# Patient Record
Sex: FEMALE | Race: BLACK OR AFRICAN AMERICAN | NOT HISPANIC OR LATINO | Employment: OTHER | URBAN - NONMETROPOLITAN AREA
[De-identification: names, ages, dates, MRNs, and addresses within clinical notes are randomized per-mention and may not be internally consistent; named-entity substitution may affect disease eponyms.]

---

## 2017-03-09 ENCOUNTER — HOSPITAL ENCOUNTER (OUTPATIENT)
Dept: GENERAL RADIOLOGY | Facility: HOSPITAL | Age: 82
Discharge: HOME | End: 2017-03-09
Admitting: FAMILY MEDICINE

## 2017-03-09 ENCOUNTER — TRANSCRIBE ORDERS (OUTPATIENT)
Dept: ADMINISTRATIVE | Facility: HOSPITAL | Age: 82
End: 2017-03-09

## 2017-03-09 ENCOUNTER — APPOINTMENT (OUTPATIENT)
Dept: LAB | Facility: HOSPITAL | Age: 82
End: 2017-03-09

## 2017-03-09 DIAGNOSIS — M53.3 SACROCOCCYGEAL DISORDERS, NOT ELSEWHERE CLASSIFIED: Primary | ICD-10-CM

## 2017-03-09 DIAGNOSIS — M15.0 PRIMARY GENERALIZED HYPERTROPHIC OSTEOARTHROSIS: ICD-10-CM

## 2017-03-09 DIAGNOSIS — Z71.3 DIETARY COUNSELING AND SURVEILLANCE: ICD-10-CM

## 2017-03-09 DIAGNOSIS — E11.9 TYPE 2 DIABETES MELLITUS WITHOUT COMPLICATION, UNSPECIFIED LONG TERM INSULIN USE STATUS: ICD-10-CM

## 2017-03-09 DIAGNOSIS — I63.9 CEREBRAL INFARCTION, UNSPECIFIED MECHANISM (HCC): ICD-10-CM

## 2017-03-09 DIAGNOSIS — I69.351 HEMIPLEGIA AND HEMIPARESIS FOLLOWING CEREBRAL INFARCTION AFFECTING RIGHT DOMINANT SIDE (HCC): ICD-10-CM

## 2017-03-09 DIAGNOSIS — I10 ESSENTIAL (PRIMARY) HYPERTENSION: ICD-10-CM

## 2017-03-09 LAB
BASOPHILS # BLD AUTO: 0.03 10*3/MM3 (ref 0–0.2)
BASOPHILS NFR BLD AUTO: 0.4 % (ref 0–2)
DEPRECATED RDW RBC AUTO: 42 FL (ref 40–54)
EOSINOPHIL # BLD AUTO: 0.51 10*3/MM3 (ref 0–0.7)
EOSINOPHIL NFR BLD AUTO: 7.5 % (ref 0–4)
ERYTHROCYTE [DISTWIDTH] IN BLOOD BY AUTOMATED COUNT: 13.4 % (ref 12–15)
HBA1C MFR BLD: 6 %
HCT VFR BLD AUTO: 37.6 % (ref 37–47)
HGB BLD-MCNC: 12.7 G/DL (ref 12–16)
IMM GRANULOCYTES # BLD: 0.01 10*3/MM3 (ref 0–0.03)
IMM GRANULOCYTES NFR BLD: 0.1 % (ref 0–5)
LYMPHOCYTES # BLD AUTO: 2.62 10*3/MM3 (ref 0.72–4.86)
LYMPHOCYTES NFR BLD AUTO: 38.6 % (ref 15–45)
MCH RBC QN AUTO: 29.1 PG (ref 28–32)
MCHC RBC AUTO-ENTMCNC: 33.8 G/DL (ref 33–36)
MCV RBC AUTO: 86 FL (ref 82–98)
MONOCYTES # BLD AUTO: 0.54 10*3/MM3 (ref 0.19–1.3)
MONOCYTES NFR BLD AUTO: 8 % (ref 4–12)
NEUTROPHILS # BLD AUTO: 3.08 10*3/MM3 (ref 1.87–8.4)
NEUTROPHILS NFR BLD AUTO: 45.4 % (ref 39–78)
PLATELET # BLD AUTO: 104 10*3/MM3 (ref 130–400)
PMV BLD AUTO: 11.6 FL (ref 6–12)
RBC # BLD AUTO: 4.37 10*6/MM3 (ref 4.2–5.4)
WBC NRBC COR # BLD: 6.79 10*3/MM3 (ref 4.8–10.8)

## 2017-03-09 PROCEDURE — 36415 COLL VENOUS BLD VENIPUNCTURE: CPT | Performed by: FAMILY MEDICINE

## 2017-03-09 PROCEDURE — 83036 HEMOGLOBIN GLYCOSYLATED A1C: CPT | Performed by: FAMILY MEDICINE

## 2017-03-09 PROCEDURE — 72170 X-RAY EXAM OF PELVIS: CPT

## 2017-03-09 PROCEDURE — 85025 COMPLETE CBC W/AUTO DIFF WBC: CPT | Performed by: FAMILY MEDICINE

## 2017-03-20 ENCOUNTER — HOSPITAL ENCOUNTER (EMERGENCY)
Age: 82
Discharge: HOME | End: 2017-03-20
Payer: MEDICARE

## 2017-03-20 ENCOUNTER — APPOINTMENT (OUTPATIENT)
Dept: GENERAL RADIOLOGY | Age: 82
End: 2017-03-20
Payer: MEDICARE

## 2017-03-20 VITALS
WEIGHT: 153 LBS | HEART RATE: 60 BPM | HEIGHT: 64 IN | DIASTOLIC BLOOD PRESSURE: 80 MMHG | TEMPERATURE: 97.4 F | RESPIRATION RATE: 17 BRPM | BODY MASS INDEX: 26.12 KG/M2 | OXYGEN SATURATION: 98 % | SYSTOLIC BLOOD PRESSURE: 169 MMHG

## 2017-03-20 DIAGNOSIS — M25.551 PAIN OF RIGHT HIP JOINT: Primary | ICD-10-CM

## 2017-03-20 PROCEDURE — 99283 EMERGENCY DEPT VISIT LOW MDM: CPT | Performed by: PHYSICIAN ASSISTANT

## 2017-03-20 PROCEDURE — 6370000000 HC RX 637 (ALT 250 FOR IP): Performed by: PHYSICIAN ASSISTANT

## 2017-03-20 PROCEDURE — 73502 X-RAY EXAM HIP UNI 2-3 VIEWS: CPT

## 2017-03-20 PROCEDURE — 99283 EMERGENCY DEPT VISIT LOW MDM: CPT

## 2017-03-20 RX ORDER — HYDROCODONE BITARTRATE AND ACETAMINOPHEN 5; 325 MG/1; MG/1
1 TABLET ORAL ONCE
Status: COMPLETED | OUTPATIENT
Start: 2017-03-20 | End: 2017-03-20

## 2017-03-20 RX ORDER — CYCLOBENZAPRINE HCL 10 MG
10 TABLET ORAL 3 TIMES DAILY PRN
Qty: 15 TABLET | Refills: 0 | Status: SHIPPED | OUTPATIENT
Start: 2017-03-20 | End: 2017-03-30

## 2017-03-20 RX ADMIN — HYDROCODONE BITARTRATE AND ACETAMINOPHEN 1 TABLET: 5; 325 TABLET ORAL at 13:27

## 2017-03-20 ASSESSMENT — PAIN DESCRIPTION - PAIN TYPE: TYPE: ACUTE PAIN

## 2017-03-20 ASSESSMENT — PAIN DESCRIPTION - LOCATION: LOCATION: HIP

## 2017-03-20 ASSESSMENT — PAIN SCALES - GENERAL
PAINLEVEL_OUTOF10: 10
PAINLEVEL_OUTOF10: 7

## 2017-03-21 ASSESSMENT — ENCOUNTER SYMPTOMS
APNEA: 0
NAUSEA: 0
DIARRHEA: 0
EYE PAIN: 0
VOMITING: 0
RHINORRHEA: 0
ABDOMINAL PAIN: 0
SORE THROAT: 0
SHORTNESS OF BREATH: 0
ABDOMINAL DISTENTION: 0
CONSTIPATION: 0
SINUS PRESSURE: 0
CHEST TIGHTNESS: 0
WHEEZING: 0
COUGH: 0

## 2017-07-05 ENCOUNTER — HOSPITAL ENCOUNTER (OUTPATIENT)
Dept: GENERAL RADIOLOGY | Facility: HOSPITAL | Age: 82
Discharge: HOME | End: 2017-07-05

## 2017-07-05 ENCOUNTER — HOSPITAL ENCOUNTER (OUTPATIENT)
Dept: GENERAL RADIOLOGY | Facility: HOSPITAL | Age: 82
Discharge: HOME | End: 2017-07-05
Admitting: CLINICAL NURSE SPECIALIST

## 2017-07-05 ENCOUNTER — TRANSCRIBE ORDERS (OUTPATIENT)
Dept: LAB | Facility: HOSPITAL | Age: 82
End: 2017-07-05

## 2017-07-05 DIAGNOSIS — M54.5 LOW BACK PAIN, UNSPECIFIED BACK PAIN LATERALITY, UNSPECIFIED CHRONICITY, WITH SCIATICA PRESENCE UNSPECIFIED: ICD-10-CM

## 2017-07-05 DIAGNOSIS — M19.071: ICD-10-CM

## 2017-07-05 DIAGNOSIS — M54.2 CERVICALGIA: ICD-10-CM

## 2017-07-05 DIAGNOSIS — M19.071: Primary | ICD-10-CM

## 2017-07-05 PROCEDURE — 72110 X-RAY EXAM L-2 SPINE 4/>VWS: CPT

## 2017-07-05 PROCEDURE — 72050 X-RAY EXAM NECK SPINE 4/5VWS: CPT

## 2017-07-05 PROCEDURE — 73630 X-RAY EXAM OF FOOT: CPT

## 2017-07-05 PROCEDURE — 73610 X-RAY EXAM OF ANKLE: CPT

## 2018-09-22 ENCOUNTER — APPOINTMENT (OUTPATIENT)
Dept: CT IMAGING | Facility: HOSPITAL | Age: 83
End: 2018-09-22

## 2018-09-22 ENCOUNTER — HOSPITAL ENCOUNTER (EMERGENCY)
Facility: HOSPITAL | Age: 83
Discharge: HOME | End: 2018-09-22
Attending: EMERGENCY MEDICINE | Admitting: EMERGENCY MEDICINE

## 2018-09-22 ENCOUNTER — APPOINTMENT (OUTPATIENT)
Dept: GENERAL RADIOLOGY | Facility: HOSPITAL | Age: 83
End: 2018-09-22

## 2018-09-22 VITALS
HEIGHT: 64 IN | WEIGHT: 126 LBS | HEART RATE: 78 BPM | OXYGEN SATURATION: 96 % | RESPIRATION RATE: 20 BRPM | TEMPERATURE: 100.5 F | DIASTOLIC BLOOD PRESSURE: 61 MMHG | BODY MASS INDEX: 21.51 KG/M2 | SYSTOLIC BLOOD PRESSURE: 148 MMHG

## 2018-09-22 DIAGNOSIS — N39.0 URINARY TRACT INFECTION WITHOUT HEMATURIA, SITE UNSPECIFIED: ICD-10-CM
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
## 2018-09-22 LAB
ALBUMIN SERPL-MCNC: 4.4 G/DL (ref 3.5–5)
ALBUMIN/GLOB SERPL: 1.1 G/DL (ref 1.1–2.5)
ALP SERPL-CCNC: 87 U/L (ref 24–120)
ALT SERPL W P-5'-P-CCNC: 16 U/L (ref 0–54)
ANION GAP SERPL CALCULATED.3IONS-SCNC: 13 MMOL/L (ref 4–13)
APTT PPP: 26.9 SECONDS (ref 24.1–34.8)
AST SERPL-CCNC: 35 U/L (ref 7–45)
BACTERIA UR QL AUTO: ABNORMAL /HPF
BILIRUB SERPL-MCNC: 2.4 MG/DL (ref 0.1–1)
BILIRUB UR QL STRIP: NEGATIVE
BUN BLD-MCNC: 13 MG/DL (ref 5–21)
BUN/CREAT SERPL: 17.3 (ref 7–25)
CALCIUM SPEC-SCNC: 9.6 MG/DL (ref 8.4–10.4)
CHLORIDE SERPL-SCNC: 104 MMOL/L (ref 98–110)
CK MB SERPL-CCNC: 1.4 NG/ML (ref 0–5)
CLARITY UR: CLEAR
CO2 SERPL-SCNC: 26 MMOL/L (ref 24–31)
COLOR UR: YELLOW
CREAT BLD-MCNC: 0.75 MG/DL (ref 0.5–1.4)
DEPRECATED RDW RBC AUTO: 40.7 FL (ref 40–54)
EOSINOPHIL # BLD MANUAL: 0.12 10*3/MM3 (ref 0–0.7)
EOSINOPHIL NFR BLD MANUAL: 1 % (ref 0–4)
ERYTHROCYTE [DISTWIDTH] IN BLOOD BY AUTOMATED COUNT: 12.9 % (ref 12–15)
GFR SERPL CREATININE-BSD FRML MDRD: 89 ML/MIN/1.73
GLOBULIN UR ELPH-MCNC: 4.1 GM/DL
GLUCOSE BLD-MCNC: 127 MG/DL (ref 70–100)
GLUCOSE UR STRIP-MCNC: NEGATIVE MG/DL
HCT VFR BLD AUTO: 37.4 % (ref 37–47)
HGB BLD-MCNC: 13 G/DL (ref 12–16)
HGB UR QL STRIP.AUTO: ABNORMAL
INR PPP: 1.07 (ref 0.91–1.09)
KETONES UR QL STRIP: ABNORMAL
LEUKOCYTE ESTERASE UR QL STRIP.AUTO: ABNORMAL
LYMPHOCYTES # BLD MANUAL: 1.46 10*3/MM3 (ref 0.72–4.86)
LYMPHOCYTES NFR BLD MANUAL: 12 % (ref 15–45)
LYMPHOCYTES NFR BLD MANUAL: 4 % (ref 4–12)
MCH RBC QN AUTO: 30 PG (ref 28–32)
MCHC RBC AUTO-ENTMCNC: 34.8 G/DL (ref 33–36)
MCV RBC AUTO: 86.4 FL (ref 82–98)
MONOCYTES # BLD AUTO: 0.49 10*3/MM3 (ref 0.19–1.3)
NEUTROPHILS # BLD AUTO: 10.08 10*3/MM3 (ref 1.87–8.4)
NEUTROPHILS NFR BLD MANUAL: 83 % (ref 39–78)
NITRITE UR QL STRIP: NEGATIVE
PH UR STRIP.AUTO: 6 [PH] (ref 5–8)
PLATELET # BLD AUTO: 112 10*3/MM3 (ref 130–400)
PMV BLD AUTO: 10.8 FL (ref 6–12)
POTASSIUM BLD-SCNC: 4.1 MMOL/L (ref 3.5–5.3)
PROT SERPL-MCNC: 8.5 G/DL (ref 6.3–8.7)
PROT UR QL STRIP: ABNORMAL
PROTHROMBIN TIME: 14.2 SECONDS (ref 11.9–14.6)
RBC # BLD AUTO: 4.33 10*6/MM3 (ref 4.2–5.4)
RBC # UR: ABNORMAL /HPF
RBC MORPH BLD: NORMAL
REF LAB TEST METHOD: ABNORMAL
SMALL PLATELETS BLD QL SMEAR: ABNORMAL
SODIUM BLD-SCNC: 143 MMOL/L (ref 135–145)
SP GR UR STRIP: 1.02 (ref 1–1.03)
SQUAMOUS #/AREA URNS HPF: ABNORMAL /HPF
URINE MYOGLOBIN, QUALITATIVE: POSITIVE
UROBILINOGEN UR QL STRIP: ABNORMAL
WBC MORPH BLD: NORMAL
WBC NRBC COR # BLD: 12.15 10*3/MM3 (ref 4.8–10.8)
WBC UR QL AUTO: ABNORMAL /HPF

## 2018-09-22 PROCEDURE — 36415 COLL VENOUS BLD VENIPUNCTURE: CPT | Performed by: NURSE PRACTITIONER

## 2018-09-22 PROCEDURE — 81001 URINALYSIS AUTO W/SCOPE: CPT | Performed by: NURSE PRACTITIONER

## 2018-09-22 PROCEDURE — 99284 EMERGENCY DEPT VISIT MOD MDM: CPT

## 2018-09-22 PROCEDURE — 73523 X-RAY EXAM HIPS BI 5/> VIEWS: CPT

## 2018-09-22 PROCEDURE — 85007 BL SMEAR W/DIFF WBC COUNT: CPT | Performed by: NURSE PRACTITIONER

## 2018-09-22 PROCEDURE — 85025 COMPLETE CBC W/AUTO DIFF WBC: CPT | Performed by: NURSE PRACTITIONER

## 2018-09-22 PROCEDURE — 82553 CREATINE MB FRACTION: CPT | Performed by: NURSE PRACTITIONER

## 2018-09-22 PROCEDURE — 80053 COMPREHEN METABOLIC PANEL: CPT | Performed by: NURSE PRACTITIONER

## 2018-09-22 PROCEDURE — 85730 THROMBOPLASTIN TIME PARTIAL: CPT | Performed by: NURSE PRACTITIONER

## 2018-09-22 PROCEDURE — 83874 ASSAY OF MYOGLOBIN: CPT | Performed by: NURSE PRACTITIONER

## 2018-09-22 PROCEDURE — 70450 CT HEAD/BRAIN W/O DYE: CPT

## 2018-09-22 PROCEDURE — 85610 PROTHROMBIN TIME: CPT | Performed by: NURSE PRACTITIONER

## 2018-09-22 RX ORDER — IBUPROFEN 200 MG
200 TABLET ORAL EVERY 6 HOURS PRN
COMMUNITY

## 2018-09-22 RX ORDER — CEFDINIR 300 MG/1
300 CAPSULE ORAL 2 TIMES DAILY
Qty: 20 CAPSULE | Refills: 0 | Status: SHIPPED | OUTPATIENT
Start: 2018-09-22 | End: 2018-10-02

## 2018-09-22 RX ORDER — CLONIDINE HYDROCHLORIDE 0.1 MG/1
0.1 TABLET ORAL ONCE
Status: COMPLETED | OUTPATIENT
Start: 2018-09-22 | End: 2018-09-22

## 2018-09-22 RX ORDER — NAPROXEN SODIUM 220 MG
220 TABLET ORAL AS NEEDED
COMMUNITY

## 2018-09-22 RX ADMIN — CLONIDINE HYDROCHLORIDE 0.1 MG: 0.1 TABLET ORAL at 16:41

## 2018-09-23 ENCOUNTER — HOSPITAL ENCOUNTER (INPATIENT)
Age: 83
LOS: 4 days | Discharge: NURSING, REHAB OR LONG-TERM CARE | DRG: 564 | End: 2018-09-27
Attending: FAMILY MEDICINE | Admitting: INTERNAL MEDICINE
Payer: MEDICARE

## 2018-09-23 ENCOUNTER — APPOINTMENT (OUTPATIENT)
Dept: GENERAL RADIOLOGY | Age: 83
DRG: 564 | End: 2018-09-23
Payer: MEDICARE

## 2018-09-23 DIAGNOSIS — M62.82 NON-TRAUMATIC RHABDOMYOLYSIS: ICD-10-CM

## 2018-09-23 DIAGNOSIS — R10.9 RIGHT FLANK PAIN: Primary | ICD-10-CM

## 2018-09-23 PROBLEM — E86.0 DEHYDRATION: Status: ACTIVE | Noted: 2018-09-23

## 2018-09-23 LAB
ALBUMIN SERPL-MCNC: 3.8 G/DL (ref 3.5–5.2)
ALP BLD-CCNC: 65 U/L (ref 35–104)
ALT SERPL-CCNC: 10 U/L (ref 5–33)
ANION GAP SERPL CALCULATED.3IONS-SCNC: 14 MMOL/L (ref 7–19)
AST SERPL-CCNC: 28 U/L (ref 5–32)
BASE EXCESS ARTERIAL: -1.2 MMOL/L (ref -2–2)
BASOPHILS ABSOLUTE: 0 K/UL (ref 0–0.2)
BASOPHILS RELATIVE PERCENT: 0.2 % (ref 0–1)
BILIRUB SERPL-MCNC: 2 MG/DL (ref 0.2–1.2)
BUN BLDV-MCNC: 19 MG/DL (ref 8–23)
C-REACTIVE PROTEIN: 21.9 MG/DL (ref 0–0.5)
CALCIUM SERPL-MCNC: 9.4 MG/DL (ref 8.8–10.2)
CARBOXYHEMOGLOBIN ARTERIAL: 2.5 % (ref 0–5)
CHLORIDE BLD-SCNC: 102 MMOL/L (ref 98–111)
CO2: 24 MMOL/L (ref 22–29)
CREAT SERPL-MCNC: 1 MG/DL (ref 0.5–0.9)
EOSINOPHILS ABSOLUTE: 0 K/UL (ref 0–0.6)
EOSINOPHILS RELATIVE PERCENT: 0.3 % (ref 0–5)
FOLATE: 15.7 NG/ML (ref 4.8–37.3)
GFR NON-AFRICAN AMERICAN: 52
GLUCOSE BLD-MCNC: 112 MG/DL (ref 74–109)
HBA1C MFR BLD: 5.8 % (ref 4–6)
HCO3 ARTERIAL: 22.4 MMOL/L (ref 22–26)
HCT VFR BLD CALC: 38.2 % (ref 37–47)
HEMOGLOBIN, ART, EXTENDED: 12.1 G/DL (ref 12–16)
HEMOGLOBIN: 12.8 G/DL (ref 12–16)
LACTIC ACID: 0.9 MMOL/L (ref 0.5–1.9)
LYMPHOCYTES ABSOLUTE: 1.4 K/UL (ref 1.1–4.5)
LYMPHOCYTES RELATIVE PERCENT: 10.9 % (ref 20–40)
MAGNESIUM: 2 MG/DL (ref 1.6–2.4)
MAGNESIUM: 2.1 MG/DL (ref 1.6–2.4)
MCH RBC QN AUTO: 30 PG (ref 27–31)
MCHC RBC AUTO-ENTMCNC: 33.5 G/DL (ref 33–37)
MCV RBC AUTO: 89.7 FL (ref 81–99)
METHEMOGLOBIN ARTERIAL: 1.4 %
MONOCYTES ABSOLUTE: 0.9 K/UL (ref 0–0.9)
MONOCYTES RELATIVE PERCENT: 6.7 % (ref 0–10)
NEUTROPHILS ABSOLUTE: 10.3 K/UL (ref 1.5–7.5)
NEUTROPHILS RELATIVE PERCENT: 81.4 % (ref 50–65)
O2 CONTENT ARTERIAL: 15.3 ML/DL
O2 SAT, ARTERIAL: 90 %
O2 THERAPY: ABNORMAL
PCO2 ARTERIAL: 33 MMHG (ref 35–45)
PDW BLD-RTO: 13 % (ref 11.5–14.5)
PH ARTERIAL: 7.44 (ref 7.35–7.45)
PLATELET # BLD: 112 K/UL (ref 130–400)
PMV BLD AUTO: 9.9 FL (ref 9.4–12.3)
PO2 ARTERIAL: 57 MMHG (ref 80–100)
POTASSIUM REFLEX MAGNESIUM: 3.6 MMOL/L (ref 3.5–5)
POTASSIUM, WHOLE BLOOD: 3.6
PRO-BNP: 1504 PG/ML (ref 0–1800)
RBC # BLD: 4.26 M/UL (ref 4.2–5.4)
SEDIMENTATION RATE, ERYTHROCYTE: 50 MM/HR (ref 0–25)
SODIUM BLD-SCNC: 140 MMOL/L (ref 136–145)
TOTAL CK: 365 U/L (ref 26–192)
TOTAL PROTEIN: 8 G/DL (ref 6.6–8.7)
TSH SERPL DL<=0.05 MIU/L-ACNC: 1.02 UIU/ML (ref 0.27–4.2)
VITAMIN B-12: 284 PG/ML (ref 211–946)
VITAMIN D 25-HYDROXY: 16.1 NG/ML
WBC # BLD: 12.7 K/UL (ref 4.8–10.8)

## 2018-09-23 PROCEDURE — 80053 COMPREHEN METABOLIC PANEL: CPT

## 2018-09-23 PROCEDURE — 83880 ASSAY OF NATRIURETIC PEPTIDE: CPT

## 2018-09-23 PROCEDURE — 99285 EMERGENCY DEPT VISIT HI MDM: CPT | Performed by: FAMILY MEDICINE

## 2018-09-23 PROCEDURE — 2500000003 HC RX 250 WO HCPCS: Performed by: HOSPITALIST

## 2018-09-23 PROCEDURE — 73552 X-RAY EXAM OF FEMUR 2/>: CPT

## 2018-09-23 PROCEDURE — 96376 TX/PRO/DX INJ SAME DRUG ADON: CPT

## 2018-09-23 PROCEDURE — 93005 ELECTROCARDIOGRAM TRACING: CPT

## 2018-09-23 PROCEDURE — 83735 ASSAY OF MAGNESIUM: CPT

## 2018-09-23 PROCEDURE — 84132 ASSAY OF SERUM POTASSIUM: CPT

## 2018-09-23 PROCEDURE — 82306 VITAMIN D 25 HYDROXY: CPT

## 2018-09-23 PROCEDURE — 96374 THER/PROPH/DIAG INJ IV PUSH: CPT

## 2018-09-23 PROCEDURE — 83036 HEMOGLOBIN GLYCOSYLATED A1C: CPT

## 2018-09-23 PROCEDURE — 82550 ASSAY OF CK (CPK): CPT

## 2018-09-23 PROCEDURE — 36415 COLL VENOUS BLD VENIPUNCTURE: CPT

## 2018-09-23 PROCEDURE — 94762 N-INVAS EAR/PLS OXIMTRY CONT: CPT

## 2018-09-23 PROCEDURE — 85652 RBC SED RATE AUTOMATED: CPT

## 2018-09-23 PROCEDURE — 84443 ASSAY THYROID STIM HORMONE: CPT

## 2018-09-23 PROCEDURE — 87040 BLOOD CULTURE FOR BACTERIA: CPT

## 2018-09-23 PROCEDURE — 99285 EMERGENCY DEPT VISIT HI MDM: CPT

## 2018-09-23 PROCEDURE — 94640 AIRWAY INHALATION TREATMENT: CPT

## 2018-09-23 PROCEDURE — 83605 ASSAY OF LACTIC ACID: CPT

## 2018-09-23 PROCEDURE — 6370000000 HC RX 637 (ALT 250 FOR IP): Performed by: HOSPITALIST

## 2018-09-23 PROCEDURE — 99222 1ST HOSP IP/OBS MODERATE 55: CPT | Performed by: HOSPITALIST

## 2018-09-23 PROCEDURE — 82746 ASSAY OF FOLIC ACID SERUM: CPT

## 2018-09-23 PROCEDURE — 6360000002 HC RX W HCPCS: Performed by: HOSPITALIST

## 2018-09-23 PROCEDURE — 87086 URINE CULTURE/COLONY COUNT: CPT

## 2018-09-23 PROCEDURE — 36600 WITHDRAWAL OF ARTERIAL BLOOD: CPT

## 2018-09-23 PROCEDURE — 2580000003 HC RX 258: Performed by: HOSPITALIST

## 2018-09-23 PROCEDURE — 1210000000 HC MED SURG R&B

## 2018-09-23 PROCEDURE — 86140 C-REACTIVE PROTEIN: CPT

## 2018-09-23 PROCEDURE — 6360000002 HC RX W HCPCS: Performed by: FAMILY MEDICINE

## 2018-09-23 PROCEDURE — 71046 X-RAY EXAM CHEST 2 VIEWS: CPT

## 2018-09-23 PROCEDURE — 2700000000 HC OXYGEN THERAPY PER DAY

## 2018-09-23 PROCEDURE — 82607 VITAMIN B-12: CPT

## 2018-09-23 PROCEDURE — 82803 BLOOD GASES ANY COMBINATION: CPT

## 2018-09-23 PROCEDURE — 2580000003 HC RX 258: Performed by: FAMILY MEDICINE

## 2018-09-23 PROCEDURE — 85025 COMPLETE CBC W/AUTO DIFF WBC: CPT

## 2018-09-23 RX ORDER — MORPHINE SULFATE/0.9% NACL/PF 1 MG/ML
2 SYRINGE (ML) INJECTION ONCE
Status: COMPLETED | OUTPATIENT
Start: 2018-09-23 | End: 2018-09-23

## 2018-09-23 RX ORDER — CYANOCOBALAMIN 1000 UG/ML
1000 INJECTION INTRAMUSCULAR; SUBCUTANEOUS ONCE
Status: COMPLETED | OUTPATIENT
Start: 2018-09-23 | End: 2018-09-23

## 2018-09-23 RX ORDER — FUROSEMIDE 10 MG/ML
40 INJECTION INTRAMUSCULAR; INTRAVENOUS ONCE
Status: COMPLETED | OUTPATIENT
Start: 2018-09-23 | End: 2018-09-23

## 2018-09-23 RX ORDER — ERGOCALCIFEROL 1.25 MG/1
50000 CAPSULE ORAL WEEKLY
Status: DISCONTINUED | OUTPATIENT
Start: 2018-09-23 | End: 2018-09-27 | Stop reason: HOSPADM

## 2018-09-23 RX ORDER — IPRATROPIUM BROMIDE AND ALBUTEROL SULFATE 2.5; .5 MG/3ML; MG/3ML
1 SOLUTION RESPIRATORY (INHALATION)
Status: DISCONTINUED | OUTPATIENT
Start: 2018-09-23 | End: 2018-09-26

## 2018-09-23 RX ORDER — 0.9 % SODIUM CHLORIDE 0.9 %
500 INTRAVENOUS SOLUTION INTRAVENOUS ONCE
Status: COMPLETED | OUTPATIENT
Start: 2018-09-23 | End: 2018-09-23

## 2018-09-23 RX ORDER — SODIUM CHLORIDE 9 MG/ML
INJECTION, SOLUTION INTRAVENOUS CONTINUOUS
Status: DISCONTINUED | OUTPATIENT
Start: 2018-09-23 | End: 2018-09-23

## 2018-09-23 RX ORDER — LISINOPRIL 10 MG/1
10 TABLET ORAL DAILY
Status: DISCONTINUED | OUTPATIENT
Start: 2018-09-23 | End: 2018-09-27 | Stop reason: HOSPADM

## 2018-09-23 RX ORDER — HYDRALAZINE HYDROCHLORIDE 20 MG/ML
5 INJECTION INTRAMUSCULAR; INTRAVENOUS EVERY 4 HOURS PRN
Status: DISCONTINUED | OUTPATIENT
Start: 2018-09-23 | End: 2018-09-27 | Stop reason: HOSPADM

## 2018-09-23 RX ADMIN — Medication 2 MG: at 13:21

## 2018-09-23 RX ADMIN — SODIUM CHLORIDE 1000 ML: 9 INJECTION, SOLUTION INTRAVENOUS at 12:45

## 2018-09-23 RX ADMIN — LISINOPRIL 10 MG: 10 TABLET ORAL at 18:28

## 2018-09-23 RX ADMIN — SODIUM CHLORIDE: 9 INJECTION, SOLUTION INTRAVENOUS at 18:29

## 2018-09-23 RX ADMIN — CYANOCOBALAMIN 1000 MCG: 1000 INJECTION, SOLUTION INTRAMUSCULAR at 20:38

## 2018-09-23 RX ADMIN — ERGOCALCIFEROL 50000 UNITS: 1.25 CAPSULE ORAL at 20:38

## 2018-09-23 RX ADMIN — TAZOBACTAM SODIUM AND PIPERACILLIN SODIUM 3.38 G: 375; 3 INJECTION, SOLUTION INTRAVENOUS at 20:47

## 2018-09-23 RX ADMIN — FUROSEMIDE 40 MG: 10 INJECTION, SOLUTION INTRAMUSCULAR; INTRAVENOUS at 23:18

## 2018-09-23 RX ADMIN — Medication 2 MG: at 14:54

## 2018-09-23 RX ADMIN — IPRATROPIUM BROMIDE AND ALBUTEROL SULFATE 1 AMPULE: .5; 3 SOLUTION RESPIRATORY (INHALATION) at 19:00

## 2018-09-23 ASSESSMENT — ENCOUNTER SYMPTOMS
BACK PAIN: 0
SHORTNESS OF BREATH: 0
DIARRHEA: 0
VOMITING: 0
CONSTIPATION: 0
COUGH: 0
SORE THROAT: 0
TROUBLE SWALLOWING: 0
APNEA: 0
ABDOMINAL DISTENTION: 0
WHEEZING: 0
CHEST TIGHTNESS: 0
ABDOMINAL PAIN: 0

## 2018-09-23 ASSESSMENT — PAIN DESCRIPTION - PAIN TYPE: TYPE: ACUTE PAIN

## 2018-09-23 ASSESSMENT — PAIN SCALES - GENERAL
PAINLEVEL_OUTOF10: 0
PAINLEVEL_OUTOF10: 10
PAINLEVEL_OUTOF10: 10
PAINLEVEL_OUTOF10: 9

## 2018-09-23 ASSESSMENT — PAIN DESCRIPTION - ORIENTATION: ORIENTATION: RIGHT

## 2018-09-23 ASSESSMENT — PAIN DESCRIPTION - LOCATION: LOCATION: GENERALIZED

## 2018-09-23 NOTE — Clinical Note
Patient Class: Observation [104]   REQUIRED: Diagnosis: Dehydration [276.51. ICD-9-CM]   Estimated Length of Stay: Estimated stay of more than 2 midnights   Future Attending Provider: Colt Brock [[id number]]

## 2018-09-23 NOTE — CARE COORDINATION
Spoke with patient and daughter at bedside as well as one of her daughters via speakerphone. Patient is in pain and has difficulty remembering the sequence of events that brought her here. Evidently the patient had fallen in the floor and had spent unknown time down. Her daughter said she spoke to her sometime Friday and she was fine and then she was found down on the floor by staff at the [facility] on Saturday and they called EMS. ;  Patient was taken to the ED at [facility] and was found to have a UTI and discharged home on antibiotics. This am the patient was found by her daughter to be \"stuck\" on the couch; unable to come to standing and having significant amount of pain. Patient agrees that she \"probably\" does need to go somewhere for some rehab but is fearful that she will lose her apartment. Explained to the patient that if she gets better from rehab she may be able to return to her home (daugher and I had spoken privately and she believes it is time for long term placement for her mother but she doesn't want to take hope away from her mother at this time)  Patient has been at [facility] in the past following her stroke and that is the placement the family prefers. Contacted Jonny Marroquin, admission  liason at [facility], to verify that [facility] does not require a 3 day inpatient admission, and thankfully this is the case. Relayed this information to Dr Rosendo Valente; expecting the patient to be admitted observation status. Will notify [facility] of the referral.  Of note, there will be a precert required for admission to the SNF. Please order PT eval upon admission as this will be required for the precert. Care Management staff will follow.   Electronically signed by Francisco Leos RN on 9/23/2018 at 3:20 PM

## 2018-09-23 NOTE — ED PROVIDER NOTES
Psychiatric/Behavioral: Negative for behavioral problems and suicidal ideas. All other systems reviewed and are negative. 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  Hiatal hernia     Stroke ([facility]     Tobacco abuse     Type 2 diabetes mellitus without complication ([facility] 03/24/9073         SURGICAL HISTORY       Past Surgical History:   Procedure Laterality Date    CARDIAC CATHETERIZATION  09/25/2003    EF greater 50%  Angiographically normal coronary arteries, Normal left ventricular systolic function, Mild left ventricular systolic function, Mild left ventricular diastolic dysfunction and mild systemic hypertesion.  HYSTERECTOMY           CURRENT MEDICATIONS       Previous Medications    ACETAMINOPHEN 650 MG TABS    Take 650 mg by mouth every 4 hours as needed       ALLERGIES     Patient has no known allergies. FAMILY HISTORY       Family History   Problem Relation Age of Onset    Diabetes Mother     Heart Disease Maternal Grandmother           SOCIAL HISTORY       Social History     Social History    Marital status:       Spouse name: N/A    Number of children: N/A    Years of education: N/A     Social History Main Topics    Smoking status: Current Every Day Smoker     Packs/day: 0.50     Years: 70.00     Types: Cigarettes    Smokeless tobacco: Never Used    Alcohol use No    Drug use: No    Sexual activity: Not Currently     Other Topics Concern    Not on file     Social History Narrative    No narrative on file       SCREENINGS    Kobi Coma Scale  Eye Opening: Spontaneous  Best Verbal Response: Oriented  Best Motor Response: Obeys commands  Kobi

## 2018-09-23 NOTE — H&P
Allergies:  Patient has no known allergies. Review of Systems:   · Constitutional: there has been no unanticipated weight loss. There's been change in energy level, sleep pattern, or activity level. · Eyes: No visual changes or diplopia. No scleral icterus. · ENT: No Headaches, hearing loss or vertigo. No mouth sores or sore throat. · Cardiovascular: No chest pain, dyspnea on exertion, palpitations or loss of consciousness. No cough, hemoptysis, pleuritic pain, or phlebitis. · Respiratory: No cough or wheezing, no sputum production. No hemoptysis. · Gastrointestinal: No abdominal pain, appetite loss, blood in stools. No change in bowel or bladder habits. · Genitourinary: No dysuria, trouble voiding, or hematuria. · Musculoskeletal:  Right sided weakness after stroke, falls  · Integumentary: No rash or pruritis. · Neurological: No headache, diplopia, new change in muscle strength, numbness or tingling. No change in mood, affect, memory, mentation, behavior. · Psychiatric: No anxiety, or depression. · Endocrine: No temperature intolerance. No excessive thirst, fluid intake, or urination. No tremor. · Hematologic/Lymphatic: No abnormal bruising or bleeding, blood clots or swollen lymph nodes. · Allergic/Immunologic: No nasal congestion or hives. Physical Examination:    Vital Signs: BP (!) 163/77   Pulse 78   Temp 101.7 °F (38.7 °C)   Resp 16   Ht 5' 2\" (1.575 m)   Wt 130 lb (59 kg)   LMP  (LMP Unknown)   SpO2 (!) 88%   Breastfeeding? No   BMI 23.78 kg/m²   General appearance: Well preserved, mesomorphic body habitus, alert, mild distress. Skin: Skin color, texture, turgor normal. No rashes or lesions. No induration or tightening palpated. Head: Normocephalic. No masses, lesions, tenderness or abnormalities  Eyes: conjunctivae/corneas clear. EOM's intact. Sclera non icteric. Ears: External ears normal.   Hearing normal to finger rub.   Nose/Sinuses: Nares normal. Septum

## 2018-09-24 LAB
ANION GAP SERPL CALCULATED.3IONS-SCNC: 14 MMOL/L (ref 7–19)
BUN BLDV-MCNC: 22 MG/DL (ref 8–23)
CALCIUM SERPL-MCNC: 8.7 MG/DL (ref 8.8–10.2)
CHLORIDE BLD-SCNC: 101 MMOL/L (ref 98–111)
CHOLESTEROL, TOTAL: 137 MG/DL (ref 160–199)
CO2: 24 MMOL/L (ref 22–29)
CREAT SERPL-MCNC: 1 MG/DL (ref 0.5–0.9)
GFR NON-AFRICAN AMERICAN: 52
GLUCOSE BLD-MCNC: 132 MG/DL (ref 74–109)
HDLC SERPL-MCNC: 39 MG/DL (ref 65–121)
LDL CHOLESTEROL CALCULATED: 79 MG/DL
LV EF: 58 %
LVEF MODALITY: NORMAL
POTASSIUM SERPL-SCNC: 3.2 MMOL/L (ref 3.5–5)
SODIUM BLD-SCNC: 139 MMOL/L (ref 136–145)
TOTAL CK: 465 U/L (ref 26–192)
TRIGL SERPL-MCNC: 94 MG/DL (ref 0–149)

## 2018-09-24 PROCEDURE — 36415 COLL VENOUS BLD VENIPUNCTURE: CPT

## 2018-09-24 PROCEDURE — G8988 SELF CARE GOAL STATUS: HCPCS

## 2018-09-24 PROCEDURE — G8979 MOBILITY GOAL STATUS: HCPCS

## 2018-09-24 PROCEDURE — G8978 MOBILITY CURRENT STATUS: HCPCS

## 2018-09-24 PROCEDURE — 6370000000 HC RX 637 (ALT 250 FOR IP): Performed by: NURSE PRACTITIONER

## 2018-09-24 PROCEDURE — 6370000000 HC RX 637 (ALT 250 FOR IP): Performed by: HOSPITALIST

## 2018-09-24 PROCEDURE — G8996 SWALLOW CURRENT STATUS: HCPCS

## 2018-09-24 PROCEDURE — 82550 ASSAY OF CK (CPK): CPT

## 2018-09-24 PROCEDURE — 99232 SBSQ HOSP IP/OBS MODERATE 35: CPT | Performed by: INTERNAL MEDICINE

## 2018-09-24 PROCEDURE — 94640 AIRWAY INHALATION TREATMENT: CPT

## 2018-09-24 PROCEDURE — 6360000002 HC RX W HCPCS: Performed by: INTERNAL MEDICINE

## 2018-09-24 PROCEDURE — 92610 EVALUATE SWALLOWING FUNCTION: CPT

## 2018-09-24 PROCEDURE — 1210000000 HC MED SURG R&B

## 2018-09-24 PROCEDURE — 94762 N-INVAS EAR/PLS OXIMTRY CONT: CPT

## 2018-09-24 PROCEDURE — 80048 BASIC METABOLIC PNL TOTAL CA: CPT

## 2018-09-24 PROCEDURE — 6360000002 HC RX W HCPCS: Performed by: HOSPITALIST

## 2018-09-24 PROCEDURE — 97535 SELF CARE MNGMENT TRAINING: CPT

## 2018-09-24 PROCEDURE — 6370000000 HC RX 637 (ALT 250 FOR IP): Performed by: INTERNAL MEDICINE

## 2018-09-24 PROCEDURE — 80061 LIPID PANEL: CPT

## 2018-09-24 PROCEDURE — G8997 SWALLOW GOAL STATUS: HCPCS

## 2018-09-24 PROCEDURE — 97162 PT EVAL MOD COMPLEX 30 MIN: CPT

## 2018-09-24 PROCEDURE — 93306 TTE W/DOPPLER COMPLETE: CPT

## 2018-09-24 PROCEDURE — 97165 OT EVAL LOW COMPLEX 30 MIN: CPT

## 2018-09-24 PROCEDURE — 2500000003 HC RX 250 WO HCPCS: Performed by: HOSPITALIST

## 2018-09-24 PROCEDURE — G8987 SELF CARE CURRENT STATUS: HCPCS

## 2018-09-24 RX ORDER — ASPIRIN 81 MG/1
81 TABLET ORAL DAILY
Status: DISCONTINUED | OUTPATIENT
Start: 2018-09-24 | End: 2018-09-26

## 2018-09-24 RX ORDER — HYDROCODONE BITARTRATE AND ACETAMINOPHEN 5; 325 MG/1; MG/1
1 TABLET ORAL EVERY 6 HOURS PRN
Status: DISCONTINUED | OUTPATIENT
Start: 2018-09-24 | End: 2018-09-25

## 2018-09-24 RX ADMIN — TAZOBACTAM SODIUM AND PIPERACILLIN SODIUM 3.38 G: 375; 3 INJECTION, SOLUTION INTRAVENOUS at 04:04

## 2018-09-24 RX ADMIN — TAZOBACTAM SODIUM AND PIPERACILLIN SODIUM 3.38 G: 375; 3 INJECTION, SOLUTION INTRAVENOUS at 12:34

## 2018-09-24 RX ADMIN — ENOXAPARIN SODIUM 40 MG: 40 INJECTION SUBCUTANEOUS at 21:02

## 2018-09-24 RX ADMIN — TAZOBACTAM SODIUM AND PIPERACILLIN SODIUM 3.38 G: 375; 3 INJECTION, SOLUTION INTRAVENOUS at 21:08

## 2018-09-24 RX ADMIN — IPRATROPIUM BROMIDE AND ALBUTEROL SULFATE 1 AMPULE: .5; 3 SOLUTION RESPIRATORY (INHALATION) at 10:10

## 2018-09-24 RX ADMIN — HYDROCODONE BITARTRATE AND ACETAMINOPHEN 1 TABLET: 5; 325 TABLET ORAL at 21:02

## 2018-09-24 RX ADMIN — ASPIRIN 81 MG: 81 TABLET ORAL at 12:34

## 2018-09-24 RX ADMIN — LISINOPRIL 10 MG: 10 TABLET ORAL at 08:29

## 2018-09-24 RX ADMIN — IPRATROPIUM BROMIDE AND ALBUTEROL SULFATE 1 AMPULE: .5; 3 SOLUTION RESPIRATORY (INHALATION) at 15:54

## 2018-09-24 RX ADMIN — IPRATROPIUM BROMIDE AND ALBUTEROL SULFATE 1 AMPULE: .5; 3 SOLUTION RESPIRATORY (INHALATION) at 19:00

## 2018-09-24 RX ADMIN — HYDRALAZINE HYDROCHLORIDE 5 MG: 20 INJECTION INTRAMUSCULAR; INTRAVENOUS at 00:06

## 2018-09-24 ASSESSMENT — PAIN SCALES - GENERAL
PAINLEVEL_OUTOF10: 0
PAINLEVEL_OUTOF10: 8
PAINLEVEL_OUTOF10: 0
PAINLEVEL_OUTOF10: 0

## 2018-09-24 NOTE — PROGRESS NOTES
goal 2: TRANSFERS SBA  Short term goal 3: ' RW SBA       Therapy Time   Individual Concurrent Group Co-treatment   Time In           Time Out           Minutes                   Toby Middleton, PT

## 2018-09-24 NOTE — PROGRESS NOTES
consulted. Fever: Source unclear with normal WBC, urinalysis and chest x-ray. Day #2 of empiric Zosyn. Urine and blood cultures pending. CRP is significantly elevated which would be expected at least to some extent with rhabdomyolysis. Follow. Diabetes mellitus type II: Follow. Hemoglobin A1c is 5.8. Patient only on sliding scale NovoLog at home. Mild thrombocytopenia: CBC in a.m.     History of CVA: Continue aspirin    Hypertension: Continue current therapy and follow    Chronic kidney disease stage III: Stable    Vitamin D deficiency: Now on supplement    Artist MD Jorge A  9/24/2018

## 2018-09-24 NOTE — PROGRESS NOTES
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    History of Present Illness:  Per Hospitalist: Daquan Clay presents to [facility] presents after fall 3 days ago, she laid on the floor for 2 days per ER report. She complains of pain on the right side but says its a chronic pain she has after a stroke. She just complains of feeling poorly. Denies any chest pain, dyspnea, abdominal pain. Denies diarrhea, dysuria, cough. In ED she was found to have mild rhabdomyolysis and was started on IV fluids. She has mild leukocytosis and fever of 101.7. CXR, UA, and blood cultures are pending. She will be started on ab. Her oxygen sat dropped to 70% on RA      Reason for Referral  Daquan Clay was referred for a bedside swallow evaluation to assess the efficiency of her swallow function, identify signs and symptoms of aspiration and make recommendations regarding safe dietary consistencies, effective compensatory strategies, and safe eating environment.       Recent Chest Xray/CT of Chest:  Narrative   EXAMINATION:  XR CHEST (2 VW)  2018 8:03 PM   HISTORY: Shortness of limits. Hyolaryngeal elevation was [facility]. Response time of the pharyngeal swallow was [facility]. No overt s/s of aspiraiton following any consistency. Prognosis  Individuals consulted  Consulted and agree with results and recommendations: Patient    Education  Patient Education Response: Verbalizes understanding      G-Code  SLP G-Codes  Functional Limitations: Swallowing  Swallow Current Status (): At least 1 percent but less than 20 percent impaired, limited or restricted  Swallow Goal Status ():  At least 1 percent but less than 20 percent impaired, limited or restricted        Yfn Ricci [address]  9/24/2018 11:03 AM      Electronically Signed By:  Tha Ventura M.S., CCC-SLP  9/24/2018,1:00 PM.

## 2018-09-24 NOTE — PROGRESS NOTES
Ramped entrance  Bathroom Shower/Tub: Tub/Shower unit, Shower chair with back  Home Equipment: Rolling walker  ADL Assistance: Independent  Homemaking Assistance: Independent  Ambulation Assistance: Independent  Transfer Assistance: Independent  Occupation: Retired  Additional Comments: Pt daughters concerned for d/c home; plan for possible d/c to [facility]        Objective   Vision: Within Functional Limits  Hearing: Within functional limits    Orientation  Overall Orientation Status: Within Functional Limits  Observation/Palpation  Posture: Good  Observation: per nursing report incontinent- no incontinence during session   Balance  Sitting Balance: Contact guard assistance  Standing Balance: Moderate assistance  Standing Balance  Sit to stand: Moderate assistance  Stand to sit: Moderate assistance  Functional Mobility  Functional - Mobility Device: Rolling Walker  Assist Level: Moderate assistance  ADL  Feeding: Independent  Grooming: Independent  UE Bathing: Setup  LE Bathing: Maximum assistance  UE Dressing: Setup  LE Dressing: Maximum assistance  Toileting: Moderate assistance        Bed mobility  Supine to Sit: Moderate assistance  Scooting: Moderate assistance  Transfers  Sit to stand: Moderate assistance  Stand to sit: Moderate assistance     Cognition  Overall Cognitive Status: WFL        Sensation  Overall Sensation Status: WFL        LUE AROM (degrees)  LUE AROM : WFL  Left Hand AROM (degrees)  Left Hand AROM: WFL  RUE AROM (degrees)  RUE AROM : WFL  Right Hand AROM (degrees)  Right Hand AROM: WFL  LUE Strength  Gross LUE Strength: WFL  RUE Strength  Gross RUE Strength: WFL          Assessment   Performance deficits / Impairments: Decreased functional mobility ; Decreased ADL status; Decreased balance;Decreased high-level IADLs;Decreased endurance;Decreased strength;Decreased coordination;Decreased safe awareness  Assessment: Pt benefits from skilled OT to address decreased pt I to complete functional

## 2018-09-25 ENCOUNTER — APPOINTMENT (OUTPATIENT)
Dept: CT IMAGING | Age: 83
DRG: 564 | End: 2018-09-25
Payer: MEDICARE

## 2018-09-25 LAB
ANION GAP SERPL CALCULATED.3IONS-SCNC: 14 MMOL/L (ref 7–19)
BASOPHILS ABSOLUTE: 0 K/UL (ref 0–0.2)
BASOPHILS RELATIVE PERCENT: 0.4 % (ref 0–1)
BUN BLDV-MCNC: 19 MG/DL (ref 8–23)
C-REACTIVE PROTEIN: 19.17 MG/DL (ref 0–0.5)
CALCIUM SERPL-MCNC: 9.4 MG/DL (ref 8.8–10.2)
CHLORIDE BLD-SCNC: 101 MMOL/L (ref 98–111)
CO2: 24 MMOL/L (ref 22–29)
CREAT SERPL-MCNC: 1.1 MG/DL (ref 0.5–0.9)
EOSINOPHILS ABSOLUTE: 0.4 K/UL (ref 0–0.6)
EOSINOPHILS RELATIVE PERCENT: 4.5 % (ref 0–5)
GFR NON-AFRICAN AMERICAN: 47
GLUCOSE BLD-MCNC: 102 MG/DL (ref 74–109)
HCT VFR BLD CALC: 38.4 % (ref 37–47)
HEMOGLOBIN: 12.7 G/DL (ref 12–16)
LYMPHOCYTES ABSOLUTE: 1.5 K/UL (ref 1.1–4.5)
LYMPHOCYTES RELATIVE PERCENT: 19.1 % (ref 20–40)
MCH RBC QN AUTO: 29.8 PG (ref 27–31)
MCHC RBC AUTO-ENTMCNC: 33.1 G/DL (ref 33–37)
MCV RBC AUTO: 90.1 FL (ref 81–99)
MONOCYTES ABSOLUTE: 0.8 K/UL (ref 0–0.9)
MONOCYTES RELATIVE PERCENT: 10.3 % (ref 0–10)
NEUTROPHILS ABSOLUTE: 5.1 K/UL (ref 1.5–7.5)
NEUTROPHILS RELATIVE PERCENT: 65.3 % (ref 50–65)
PDW BLD-RTO: 13.1 % (ref 11.5–14.5)
PLATELET # BLD: 124 K/UL (ref 130–400)
PMV BLD AUTO: 10.4 FL (ref 9.4–12.3)
POTASSIUM SERPL-SCNC: 3.1 MMOL/L (ref 3.5–5)
RBC # BLD: 4.26 M/UL (ref 4.2–5.4)
SODIUM BLD-SCNC: 139 MMOL/L (ref 136–145)
TOTAL CK: 276 U/L (ref 26–192)
URINE CULTURE, ROUTINE: NORMAL
WBC # BLD: 7.9 K/UL (ref 4.8–10.8)

## 2018-09-25 PROCEDURE — 6370000000 HC RX 637 (ALT 250 FOR IP): Performed by: HOSPITALIST

## 2018-09-25 PROCEDURE — 6360000002 HC RX W HCPCS: Performed by: INTERNAL MEDICINE

## 2018-09-25 PROCEDURE — 85025 COMPLETE CBC W/AUTO DIFF WBC: CPT

## 2018-09-25 PROCEDURE — 2500000003 HC RX 250 WO HCPCS: Performed by: HOSPITALIST

## 2018-09-25 PROCEDURE — G8998 SWALLOW D/C STATUS: HCPCS

## 2018-09-25 PROCEDURE — 94640 AIRWAY INHALATION TREATMENT: CPT

## 2018-09-25 PROCEDURE — G8997 SWALLOW GOAL STATUS: HCPCS

## 2018-09-25 PROCEDURE — 97535 SELF CARE MNGMENT TRAINING: CPT

## 2018-09-25 PROCEDURE — 97530 THERAPEUTIC ACTIVITIES: CPT

## 2018-09-25 PROCEDURE — 1210000000 HC MED SURG R&B

## 2018-09-25 PROCEDURE — 92526 ORAL FUNCTION THERAPY: CPT

## 2018-09-25 PROCEDURE — 86140 C-REACTIVE PROTEIN: CPT

## 2018-09-25 PROCEDURE — 36415 COLL VENOUS BLD VENIPUNCTURE: CPT

## 2018-09-25 PROCEDURE — 6370000000 HC RX 637 (ALT 250 FOR IP): Performed by: INTERNAL MEDICINE

## 2018-09-25 PROCEDURE — G8996 SWALLOW CURRENT STATUS: HCPCS

## 2018-09-25 PROCEDURE — 82550 ASSAY OF CK (CPK): CPT

## 2018-09-25 PROCEDURE — 94762 N-INVAS EAR/PLS OXIMTRY CONT: CPT

## 2018-09-25 PROCEDURE — 80048 BASIC METABOLIC PNL TOTAL CA: CPT

## 2018-09-25 PROCEDURE — 70450 CT HEAD/BRAIN W/O DYE: CPT

## 2018-09-25 PROCEDURE — 99232 SBSQ HOSP IP/OBS MODERATE 35: CPT | Performed by: INTERNAL MEDICINE

## 2018-09-25 RX ORDER — POTASSIUM CHLORIDE 20 MEQ/1
40 TABLET, EXTENDED RELEASE ORAL EVERY 4 HOURS
Status: DISPENSED | OUTPATIENT
Start: 2018-09-25 | End: 2018-09-26

## 2018-09-25 RX ADMIN — ASPIRIN 81 MG: 81 TABLET ORAL at 09:06

## 2018-09-25 RX ADMIN — IPRATROPIUM BROMIDE AND ALBUTEROL SULFATE 1 AMPULE: .5; 3 SOLUTION RESPIRATORY (INHALATION) at 19:47

## 2018-09-25 RX ADMIN — IPRATROPIUM BROMIDE AND ALBUTEROL SULFATE 1 AMPULE: .5; 3 SOLUTION RESPIRATORY (INHALATION) at 15:47

## 2018-09-25 RX ADMIN — LISINOPRIL 10 MG: 10 TABLET ORAL at 09:06

## 2018-09-25 RX ADMIN — TAZOBACTAM SODIUM AND PIPERACILLIN SODIUM 3.38 G: 375; 3 INJECTION, SOLUTION INTRAVENOUS at 12:16

## 2018-09-25 RX ADMIN — TAZOBACTAM SODIUM AND PIPERACILLIN SODIUM 3.38 G: 375; 3 INJECTION, SOLUTION INTRAVENOUS at 22:33

## 2018-09-25 RX ADMIN — ENOXAPARIN SODIUM 40 MG: 40 INJECTION SUBCUTANEOUS at 18:11

## 2018-09-25 RX ADMIN — IPRATROPIUM BROMIDE AND ALBUTEROL SULFATE 1 AMPULE: .5; 3 SOLUTION RESPIRATORY (INHALATION) at 11:51

## 2018-09-25 RX ADMIN — IPRATROPIUM BROMIDE AND ALBUTEROL SULFATE 1 AMPULE: .5; 3 SOLUTION RESPIRATORY (INHALATION) at 06:14

## 2018-09-25 RX ADMIN — TAZOBACTAM SODIUM AND PIPERACILLIN SODIUM 3.38 G: 375; 3 INJECTION, SOLUTION INTRAVENOUS at 04:11

## 2018-09-25 ASSESSMENT — PAIN SCALES - GENERAL
PAINLEVEL_OUTOF10: 0
PAINLEVEL_OUTOF10: 0
PAINLEVEL_OUTOF10: 10
PAINLEVEL_OUTOF10: 0

## 2018-09-25 ASSESSMENT — PAIN DESCRIPTION - LOCATION: LOCATION: LEG

## 2018-09-25 ASSESSMENT — PAIN DESCRIPTION - PAIN TYPE: TYPE: ACUTE PAIN

## 2018-09-25 ASSESSMENT — PAIN DESCRIPTION - ORIENTATION: ORIENTATION: RIGHT

## 2018-09-25 NOTE — PROGRESS NOTES
09/25/18 1221   Restrictions/Precautions   Restrictions/Precautions Fall Risk   Subjective   Subjective I can't get up my R side hurts   General Comment   Comments Colt Lima RN convinced patient to go to chair patient needed clean up first.   Pain Screening   Patient Currently in Pain Yes   Pain Assessment   Pain Assessment 0-10   Pain Level 10   Pain Type Acute pain   Pain Location Leg   Pain Orientation Right   Vital Signs   Level of Consciousness 0   Oxygen Therapy   O2 Device None (Room air)   Bed Mobility   Supine to Sit Moderate assistance   Transfers   Sit to Stand Moderate Assistance   Stand to sit Minimal Assistance   Bed to Chair Moderate assistance   Comment Step pivot to chair   Ambulation   Ambulation? No   Balance   Standing - Dynamic Fair   Short term goals   Time Frame for Short term goals 14 DAYS   Short term goal 1 BED MOB SBA   Short term goal 2 TRANSFERS SBA   Short term goal 3 ' RW SBA   Conditions Requiring Skilled Therapeutic Intervention   Body structures, Functions, Activity limitations Decreased functional mobility ; Decreased endurance;Decreased balance;Decreased strength;Decreased safe awareness;Decreased ROM   Activity Tolerance   Activity Tolerance Patient limited by pain   Safety Devices   Type of devices Left in chair;Call light within reach   Physical Therapy    Electronically signed by Sujata Ivey PTA on 9/25/2018 at 12:29 PM

## 2018-09-26 ENCOUNTER — APPOINTMENT (OUTPATIENT)
Dept: CT IMAGING | Age: 83
DRG: 564 | End: 2018-09-26
Payer: MEDICARE

## 2018-09-26 PROBLEM — Z86.73 HISTORY OF STROKE: Status: ACTIVE | Noted: 2018-09-26

## 2018-09-26 PROBLEM — G93.40 ENCEPHALOPATHY: Status: ACTIVE | Noted: 2018-09-26

## 2018-09-26 LAB
ANION GAP SERPL CALCULATED.3IONS-SCNC: 15 MMOL/L (ref 7–19)
BUN BLDV-MCNC: 17 MG/DL (ref 8–23)
CALCIUM SERPL-MCNC: 9.1 MG/DL (ref 8.8–10.2)
CHLORIDE BLD-SCNC: 105 MMOL/L (ref 98–111)
CO2: 23 MMOL/L (ref 22–29)
CREAT SERPL-MCNC: 0.8 MG/DL (ref 0.5–0.9)
GFR NON-AFRICAN AMERICAN: >60
GLUCOSE BLD-MCNC: 123 MG/DL (ref 70–99)
GLUCOSE BLD-MCNC: 179 MG/DL (ref 70–99)
GLUCOSE BLD-MCNC: 97 MG/DL (ref 70–99)
GLUCOSE BLD-MCNC: 97 MG/DL (ref 74–109)
MAGNESIUM: 2.4 MG/DL (ref 1.6–2.4)
MYOGLOBIN UR-MCNC: 1357 NG/ML (ref 0–13)
PERFORMED ON: ABNORMAL
PERFORMED ON: ABNORMAL
PERFORMED ON: NORMAL
POTASSIUM SERPL-SCNC: 3 MMOL/L (ref 3.5–5)
SODIUM BLD-SCNC: 143 MMOL/L (ref 136–145)

## 2018-09-26 PROCEDURE — 6370000000 HC RX 637 (ALT 250 FOR IP): Performed by: HOSPITALIST

## 2018-09-26 PROCEDURE — 94640 AIRWAY INHALATION TREATMENT: CPT

## 2018-09-26 PROCEDURE — 97530 THERAPEUTIC ACTIVITIES: CPT

## 2018-09-26 PROCEDURE — 82948 REAGENT STRIP/BLOOD GLUCOSE: CPT

## 2018-09-26 PROCEDURE — 6370000000 HC RX 637 (ALT 250 FOR IP): Performed by: INTERNAL MEDICINE

## 2018-09-26 PROCEDURE — 36415 COLL VENOUS BLD VENIPUNCTURE: CPT

## 2018-09-26 PROCEDURE — 70450 CT HEAD/BRAIN W/O DYE: CPT

## 2018-09-26 PROCEDURE — 83735 ASSAY OF MAGNESIUM: CPT

## 2018-09-26 PROCEDURE — 1210000000 HC MED SURG R&B

## 2018-09-26 PROCEDURE — 80048 BASIC METABOLIC PNL TOTAL CA: CPT

## 2018-09-26 PROCEDURE — 97110 THERAPEUTIC EXERCISES: CPT

## 2018-09-26 PROCEDURE — 99223 1ST HOSP IP/OBS HIGH 75: CPT | Performed by: PSYCHIATRY & NEUROLOGY

## 2018-09-26 PROCEDURE — 94762 N-INVAS EAR/PLS OXIMTRY CONT: CPT

## 2018-09-26 PROCEDURE — 99232 SBSQ HOSP IP/OBS MODERATE 35: CPT | Performed by: INTERNAL MEDICINE

## 2018-09-26 PROCEDURE — 6360000002 HC RX W HCPCS: Performed by: INTERNAL MEDICINE

## 2018-09-26 PROCEDURE — 93880 EXTRACRANIAL BILAT STUDY: CPT

## 2018-09-26 PROCEDURE — 99999 PR OFFICE/OUTPT VISIT,PROCEDURE ONLY: CPT | Performed by: INTERNAL MEDICINE

## 2018-09-26 RX ORDER — CLOPIDOGREL BISULFATE 75 MG/1
75 TABLET ORAL DAILY
Status: DISCONTINUED | OUTPATIENT
Start: 2018-09-26 | End: 2018-09-27 | Stop reason: HOSPADM

## 2018-09-26 RX ORDER — ATORVASTATIN CALCIUM 20 MG/1
10 TABLET, FILM COATED ORAL NIGHTLY
Status: DISCONTINUED | OUTPATIENT
Start: 2018-09-26 | End: 2018-09-27 | Stop reason: HOSPADM

## 2018-09-26 RX ORDER — POTASSIUM CHLORIDE 20MEQ/15ML
40 LIQUID (ML) ORAL 2 TIMES DAILY
Status: COMPLETED | OUTPATIENT
Start: 2018-09-26 | End: 2018-09-26

## 2018-09-26 RX ORDER — POTASSIUM CHLORIDE 7.45 MG/ML
40 INJECTION INTRAVENOUS ONCE
Status: DISCONTINUED | OUTPATIENT
Start: 2018-09-26 | End: 2018-09-26 | Stop reason: SDUPTHER

## 2018-09-26 RX ORDER — POTASSIUM CHLORIDE 7.45 MG/ML
10 INJECTION INTRAVENOUS
Status: DISCONTINUED | OUTPATIENT
Start: 2018-09-26 | End: 2018-09-26

## 2018-09-26 RX ORDER — IPRATROPIUM BROMIDE AND ALBUTEROL SULFATE 2.5; .5 MG/3ML; MG/3ML
1 SOLUTION RESPIRATORY (INHALATION) EVERY 4 HOURS PRN
Status: DISCONTINUED | OUTPATIENT
Start: 2018-09-26 | End: 2018-09-27 | Stop reason: HOSPADM

## 2018-09-26 RX ADMIN — IPRATROPIUM BROMIDE AND ALBUTEROL SULFATE 1 AMPULE: .5; 3 SOLUTION RESPIRATORY (INHALATION) at 14:49

## 2018-09-26 RX ADMIN — ATORVASTATIN CALCIUM 10 MG: 20 TABLET, FILM COATED ORAL at 20:01

## 2018-09-26 RX ADMIN — IPRATROPIUM BROMIDE AND ALBUTEROL SULFATE 1 AMPULE: .5; 3 SOLUTION RESPIRATORY (INHALATION) at 07:08

## 2018-09-26 RX ADMIN — POTASSIUM CHLORIDE 40 MEQ: 20 SOLUTION ORAL at 20:01

## 2018-09-26 RX ADMIN — POTASSIUM CHLORIDE 40 MEQ: 20 SOLUTION ORAL at 12:14

## 2018-09-26 RX ADMIN — CLOPIDOGREL BISULFATE 75 MG: 75 TABLET ORAL at 09:56

## 2018-09-26 RX ADMIN — ENOXAPARIN SODIUM 40 MG: 40 INJECTION SUBCUTANEOUS at 20:01

## 2018-09-26 RX ADMIN — LISINOPRIL 10 MG: 10 TABLET ORAL at 09:56

## 2018-09-26 ASSESSMENT — PAIN DESCRIPTION - LOCATION: LOCATION: LEG

## 2018-09-26 ASSESSMENT — PAIN SCALES - GENERAL
PAINLEVEL_OUTOF10: 0
PAINLEVEL_OUTOF10: 10

## 2018-09-26 ASSESSMENT — PAIN DESCRIPTION - ORIENTATION: ORIENTATION: RIGHT

## 2018-09-26 ASSESSMENT — PAIN DESCRIPTION - PAIN TYPE: TYPE: CHRONIC PAIN

## 2018-09-26 NOTE — CONSULTS
Inpatient consult to Neurology  Consult performed by: Sisi Wang ordered by: Jose Angel Segovia Neurology Consult        Patient:   Kimberli Trinidad  MR#:    [id number]  Account Number:                   [id number]      Room:    [address] [place] [address]   YOB: 1930  Date of Progress Note: 9/26/2018  Time of Note                           8:26 AM  Attending Physician:  Piper Pace MD  Consulting Physician:   Kg Medrano M.D.        [address]:  Abnormal CAT scan/confusion       HISTORY OF PRESENT ILLNESS:   This 80 y.o. female was admitted to the hospital on 9/23/2018. She lay on the floor for 2 days. She had a stroke in December 2015 with ischemia in the left basal ganglia with residual right-sided weakness. She had a stroke prior to that which affected the right side of her body. She was treated recently for urinary tract infection. She was found to have some mild rhabdomyolysis, leukocytosis and fever 101.7 with mild hypoxia and mild renal insufficiency. She does have some cognitive issues since her stroke. On the day prior to consultation has had some increased paranoia refusing IVs and was agitated after around 2 pm. Her CAT scan of the neck revealed a questionable lacunar infarct in the right thalamus. She has had aspirin for stroke prophylaxis and is currently on broad-spectrum antibiotics. REVIEW OF SYSTEMS:  Constitutional  No fever or chills. No diaphoresis or significant fatigue. HENT   No tinnitus or significant hearing loss. Eyes  no sudden vision change or eye pain  Respiratory  no significant shortness of breath or cough  Cardiovascular  no chest pain No palpitations or significant leg swelling  Gastrointestinal  no abdominal swelling or pain. Genitourinary  No difficulty urinating, dysuria  Musculoskeletal  no back pain or myalgia. Skin  no color change or rash  Neurologic  No seizures. No lateralizing weakness.   Hematologic  no easy

## 2018-09-26 NOTE — PROGRESS NOTES
Patient refusing to have another IV, Dr. Antoine Alfredo notified via secure message to see if antibiotics can be switched to PO. Will continue to monitor.  Electronically signed by Gale Alonso RN on 9/26/2018 at 8:20 AM

## 2018-09-26 NOTE — PROGRESS NOTES
Caridad Pitt is a 80 y.o. female patient. Current Facility-Administered Medications   Medication Dose Route Frequency Provider Last Rate Last Dose    pneumococcal 13-valent conjugate (PREVNAR) injection 0.5 mL  0.5 mL Intramuscular Once Cristopher Duvall MD        influenza quadrivalent split vaccine (FLUZONE;FLUARIX;FLULAVAL;AFLURIA) injection 0.5 mL  0.5 mL Intramuscular Once Cristopher Duvall MD        aspirin EC tablet 81 mg  81 mg Oral Daily Cristopher Duvall MD   81 mg at 09/25/18 0906    enoxaparin (LOVENOX) injection 40 mg  40 mg Subcutaneous Daily Cristopher Duvall MD   40 mg at 09/25/18 1811    lisinopril (PRINIVIL;ZESTRIL) tablet 10 mg  10 mg Oral Daily Marce Santoyo MD   10 mg at 09/25/18 9625    hydrALAZINE (APRESOLINE) injection 5 mg  5 mg Intravenous Q4H PRN Marce Santoyo MD   5 mg at 09/24/18 0006    vitamin D (ERGOCALCIFEROL) capsule 50,000 Units  50,000 Units Oral Weekly Marce Santoyo MD   50,000 Units at 09/23/18 2038    ipratropium-albuterol (DUONEB) nebulizer solution 1 ampule  1 ampule Inhalation Q4H WA Marce Santoyo MD   1 ampule at 09/26/18 0708    piperacillin-tazobactam (ZOSYN) 3.375 g in dextrose 50 mL IVPB extended infusion (premix)  3.375 g Intravenous Q8H Marce Santoyo MD   Stopped at 09/26/18 3993     No Known Allergies  Active Problems:    Weakness generalized    Essential hypertension    Type 2 diabetes mellitus with circulatory disorder (HCC)    Dehydration    Rhabdomyolysis    Right flank pain    Encephalopathy    History of stroke  Resolved Problems:    * No resolved hospital problems. *    Blood pressure (!) 145/78, pulse 61, temperature 97.9 °F (36.6 °C), resp. rate 18, height 5' 2\" (1.575 m), weight 130 lb (59 kg), SpO2 97 %, not currently breastfeeding. Subjective   No new complaints.     Review of systems:  Gen.: No fever or chills  Cardiovascular: No chest pain or palpitations  Pulmonary: No shortness of breath or productive coughing  Gastrointestinal: No abdominal pain, nausea, vomiting or diarrhea  Neurologic: No focal numbness or weakness    Objective       General: in no distress  Pulmonary: Lungs clear, unlabored breathing  Cardiovascular: Heart regular without murmur, no peripheral edema  Gastrointestinal: Abdomen soft, nontender, no guarding or masses  Neurologic: Alert, no focal motor deficits. 34/5 generalized weakness  Skin: Warm and dry. No rash. Assessment & Plan     Subacute right lacunar thalamic CVA: Patient will be treated with Plavix and statin. PT, OT and ST following. Fall / rhabdomyolysis: Minimal elevation of CK. Renal function normal.      Fever: Resolved with patient remaining afebrile for past 60 hours. Source unclear with normal WBC, urinalysis and chest x-ray. Possibly viral infection. With no sign of bacterial infection will stop antibiotic now. Blood cultures are negative. Urine culture with light growth of skin avery consistent with contaminated specimen.      Hypokalemia: Continue replacement. Acute kidney injury: Improved. Follow.     Diabetes mellitus type II: Adequately controlled. Hemoglobin A1c is 5.8. Patient only on sliding scale NovoLog at home. Follow.     Mild thrombocytopenia: Improved. Follow.     Hypertension: Controlled.  Continue current therapy and follow.     Chronic kidney disease stage III: Stable     Vitamin D deficiency: Now on supplement    Disposition: Plan discharge to SNF when arrangements completed.       Luci Landa MD  9/26/2018

## 2018-09-26 NOTE — PROGRESS NOTES
PT PULLED OUT IV. REFUSING TO HAVE ANOTHER IV RESTARTED. IRINEO WITH CLINICAL HOUSE ALSO ATTEMPTED TO TALK WITH PATIENT ABOUT HAVING ANOTHER IV RESTARTED BUT REFUSED TO LET HER ATTEMPT AS WELL. WILL CONTINUE TO MONITOR AND REASSESS.

## 2018-09-27 VITALS
RESPIRATION RATE: 18 BRPM | TEMPERATURE: 97.8 F | BODY MASS INDEX: 23.92 KG/M2 | DIASTOLIC BLOOD PRESSURE: 77 MMHG | HEART RATE: 56 BPM | HEIGHT: 62 IN | SYSTOLIC BLOOD PRESSURE: 133 MMHG | WEIGHT: 130 LBS | OXYGEN SATURATION: 98 %

## 2018-09-27 LAB
ANION GAP SERPL CALCULATED.3IONS-SCNC: 11 MMOL/L (ref 7–19)
BASOPHILS ABSOLUTE: 0 K/UL (ref 0–0.2)
BASOPHILS RELATIVE PERCENT: 0.5 % (ref 0–1)
BUN BLDV-MCNC: 19 MG/DL (ref 8–23)
CALCIUM SERPL-MCNC: 9.3 MG/DL (ref 8.8–10.2)
CHLORIDE BLD-SCNC: 108 MMOL/L (ref 98–111)
CO2: 24 MMOL/L (ref 22–29)
CREAT SERPL-MCNC: 0.7 MG/DL (ref 0.5–0.9)
EOSINOPHILS ABSOLUTE: 0.3 K/UL (ref 0–0.6)
EOSINOPHILS RELATIVE PERCENT: 4.1 % (ref 0–5)
GFR NON-AFRICAN AMERICAN: >60
GLUCOSE BLD-MCNC: 108 MG/DL (ref 70–99)
GLUCOSE BLD-MCNC: 109 MG/DL (ref 74–109)
HCT VFR BLD CALC: 37.1 % (ref 37–47)
HEMOGLOBIN: 12.1 G/DL (ref 12–16)
LYMPHOCYTES ABSOLUTE: 3 K/UL (ref 1.1–4.5)
LYMPHOCYTES RELATIVE PERCENT: 47.2 % (ref 20–40)
MCH RBC QN AUTO: 29.7 PG (ref 27–31)
MCHC RBC AUTO-ENTMCNC: 32.6 G/DL (ref 33–37)
MCV RBC AUTO: 90.9 FL (ref 81–99)
MONOCYTES ABSOLUTE: 0.7 K/UL (ref 0–0.9)
MONOCYTES RELATIVE PERCENT: 10.7 % (ref 0–10)
NEUTROPHILS ABSOLUTE: 2.3 K/UL (ref 1.5–7.5)
NEUTROPHILS RELATIVE PERCENT: 37.3 % (ref 50–65)
PDW BLD-RTO: 13.2 % (ref 11.5–14.5)
PERFORMED ON: ABNORMAL
PLATELET # BLD: 147 K/UL (ref 130–400)
PMV BLD AUTO: 10.5 FL (ref 9.4–12.3)
POTASSIUM SERPL-SCNC: 4.2 MMOL/L (ref 3.5–5)
RBC # BLD: 4.08 M/UL (ref 4.2–5.4)
SODIUM BLD-SCNC: 143 MMOL/L (ref 136–145)
WBC # BLD: 6.3 K/UL (ref 4.8–10.8)

## 2018-09-27 PROCEDURE — 82948 REAGENT STRIP/BLOOD GLUCOSE: CPT

## 2018-09-27 PROCEDURE — 85025 COMPLETE CBC W/AUTO DIFF WBC: CPT

## 2018-09-27 PROCEDURE — 6370000000 HC RX 637 (ALT 250 FOR IP): Performed by: HOSPITALIST

## 2018-09-27 PROCEDURE — 99238 HOSP IP/OBS DSCHRG MGMT 30/<: CPT | Performed by: INTERNAL MEDICINE

## 2018-09-27 PROCEDURE — 94762 N-INVAS EAR/PLS OXIMTRY CONT: CPT

## 2018-09-27 PROCEDURE — 6360000002 HC RX W HCPCS: Performed by: INTERNAL MEDICINE

## 2018-09-27 PROCEDURE — 80048 BASIC METABOLIC PNL TOTAL CA: CPT

## 2018-09-27 PROCEDURE — 6370000000 HC RX 637 (ALT 250 FOR IP): Performed by: INTERNAL MEDICINE

## 2018-09-27 PROCEDURE — 36415 COLL VENOUS BLD VENIPUNCTURE: CPT

## 2018-09-27 PROCEDURE — 99233 SBSQ HOSP IP/OBS HIGH 50: CPT | Performed by: PSYCHIATRY & NEUROLOGY

## 2018-09-27 RX ORDER — CLOPIDOGREL BISULFATE 75 MG/1
75 TABLET ORAL DAILY
Qty: 30 TABLET | Refills: 3 | Status: SHIPPED | OUTPATIENT
Start: 2018-09-28 | End: 2020-09-25

## 2018-09-27 RX ORDER — ONDANSETRON 4 MG/1
4 TABLET, ORALLY DISINTEGRATING ORAL EVERY 8 HOURS PRN
Qty: 10 TABLET | Refills: 0 | Status: SHIPPED | OUTPATIENT
Start: 2018-09-27 | End: 2020-09-25

## 2018-09-27 RX ORDER — LISINOPRIL 10 MG/1
10 TABLET ORAL DAILY
Qty: 30 TABLET | Refills: 3 | Status: SHIPPED | OUTPATIENT
Start: 2018-09-27 | End: 2020-09-25

## 2018-09-27 RX ORDER — ESCITALOPRAM OXALATE 5 MG/1
5 TABLET ORAL DAILY
Qty: 30 TABLET | Refills: 3 | Status: SHIPPED | OUTPATIENT
Start: 2018-09-27 | End: 2020-09-25

## 2018-09-27 RX ORDER — ERGOCALCIFEROL (VITAMIN D2) 1250 MCG
50000 CAPSULE ORAL WEEKLY
Qty: 4 CAPSULE | Refills: 0 | Status: SHIPPED | OUTPATIENT
Start: 2018-09-30 | End: 2020-10-01

## 2018-09-27 RX ORDER — ONDANSETRON 4 MG/1
4 TABLET, ORALLY DISINTEGRATING ORAL EVERY 8 HOURS PRN
Status: DISCONTINUED | OUTPATIENT
Start: 2018-09-27 | End: 2018-09-27 | Stop reason: HOSPADM

## 2018-09-27 RX ORDER — ATORVASTATIN CALCIUM 10 MG/1
10 TABLET, FILM COATED ORAL NIGHTLY
Qty: 30 TABLET | Refills: 3 | Status: SHIPPED | OUTPATIENT
Start: 2018-09-27 | End: 2020-10-01

## 2018-09-27 RX ADMIN — ONDANSETRON 4 MG: 4 TABLET, ORALLY DISINTEGRATING ORAL at 09:33

## 2018-09-27 RX ADMIN — CLOPIDOGREL BISULFATE 75 MG: 75 TABLET ORAL at 09:33

## 2018-09-27 RX ADMIN — LISINOPRIL 10 MG: 10 TABLET ORAL at 05:37

## 2018-09-27 ASSESSMENT — PAIN SCALES - GENERAL
PAINLEVEL_OUTOF10: 0

## 2018-09-27 NOTE — PROGRESS NOTES
classified elsewhere     Pneumonia     Stroke ([facility] [address]     Tobacco abuse     Type 2 diabetes mellitus without complication ([facility] 27/93/8210       Past Surgical History:      Procedure Laterality Date    CARDIAC CATHETERIZATION  09/25/2003    EF greater 50%  Angiographically normal coronary arteries, Normal left ventricular systolic function, Mild left ventricular systolic function, Mild left ventricular diastolic dysfunction and mild systemic hypertesion.  COLONOSCOPY      EYE SURGERY      HYSTERECTOMY         Medications in Hospital:      Current Facility-Administered Medications:     clopidogrel (PLAVIX) tablet 75 mg, 75 mg, Oral, Daily, Tobi Owens MD, 75 mg at 09/26/18 0956    atorvastatin (LIPITOR) tablet 10 mg, 10 mg, Oral, Nightly, Tobi Owens MD, 10 mg at 09/26/18 2001    ipratropium-albuterol (DUONEB) nebulizer solution 1 ampule, 1 ampule, Inhalation, Q4H PRN, Tobi Owens MD    pneumococcal 13-valent conjugate (PREVNAR) injection 0.5 mL, 0.5 mL, Intramuscular, Once, Tobi Owens MD    influenza quadrivalent split vaccine (FLUZONE;FLUARIX;FLULAVAL;AFLURIA) injection 0.5 mL, 0.5 mL, Intramuscular, Once, Tobi Owens MD    enoxaparin (LOVENOX) injection 40 mg, 40 mg, Subcutaneous, Daily, Tobi Owens MD, 40 mg at 09/26/18 2001    lisinopril (PRINIVIL;ZESTRIL) tablet 10 mg, 10 mg, Oral, Daily, Doug Andrew MD, 10 mg at 09/27/18 0537    hydrALAZINE (APRESOLINE) injection 5 mg, 5 mg, Intravenous, Q4H PRN, Doug Andrew MD, 5 mg at 09/24/18 0006    vitamin D (ERGOCALCIFEROL) capsule 50,000 Units, 50,000 Units, Oral, Weekly, Doug Andrew MD, 50,000 Units at 09/23/18 2038    Allergies:  Patient has no known allergies. Social History:   TOBACCO:   reports that she has been smoking Cigarettes. She has a 35.00 pack-year smoking history. She has never used smokeless tobacco.  ETOH:   reports that she does not drink alcohol.     Family LABALBU 3.8 09/23/2018    BILITOT 2.0 (H) 09/23/2018    ALKPHOS 65 09/23/2018    AST 28 09/23/2018    ALT 10 09/23/2018    LABGLOM >60 09/27/2018    GLOB 3.8 01/07/2016     Lab Results   Component Value Date    INR 1.03 12/18/2015    INR 1.03 10/17/2015    INR 1.02 02/22/2011    PROTIME 13.2 12/18/2015    PROTIME 13.2 10/17/2015    PROTIME 10.96 02/22/2011       VL DUP CAROTID BILATERAL [[id number]] Collected: 09/26/18 0754   Updated: 09/26/18 1500    Narrative:     Vascular Carotid Procedure     Demographics      Patient Name     Niki Pierre Age                    87      Patient Number   [id number]       Gender                 Female      Visit Number     [id number]    Interpreting Physician Maribel Liriano MD      Date of Birth    12/14/1930   Referring Physician   Akua Medina MD      Accession Number [id number]    Sonographer           Faisal Genao T     Procedure  Type of Study:      Cerebral:Carotid, VL CAROTID BILATERAL.     Indications for Study:CVA. Risk Factors      - The patient's risk factor(s) include: diabetes mellitus, dyslipidemia      and arterial hypertension.      - The patient has a current/recent (within 1 year) tobacco history.     Impression      There is mixed plaque visualized in the bilateral internal carotid   artery(ies).   There is less than 50% stenosis in the right internal carotid artery.  Georganne Brooms is less than 50% stenosis of the left internal carotid artery.   There is normal antegrade flow in the bilateral vertebral artery(ies).      Signature      ----------------------------------------------------------------   Electronically signed by Maribel Liriano MD(Interpreting   physician) on 09/26/2018 03:00 PM   ----------------------------------------------------------------     Blood Pressure:Right arm 148/70 mmHg. Left arm 140/75 mmHg.     Velocities are measured in cm/s ; Diameters are measured in mm    Carotid Right Measurements  +------------+-------+-------+--------+-------+------------+---------------+  ! Location    !PSV    !EDV    ! Angle   !RI     !%Stenosis   ! Tortuosity     !  +------------+-------+-------+--------+-------+------------+---------------+  ! Prox CCA    !63.6   !14.9   !60      !0.77   !            !               !  +------------+-------+-------+--------+-------+------------+---------------+  ! Mid CCA     !63.6   !12.2   !60      !0.81   !            !               !  +------------+-------+-------+--------+-------+------------+---------------+  ! Prox ICA    !54.6   !11.8   !60      !0.78   !            !               !  +------------+-------+-------+--------+-------+------------+---------------+  ! Mid ICA     !86.9   !19.2   !0       !0.78   !            !               !  +------------+-------+-------+--------+-------+------------+---------------+  ! Dist ICA    !73.9   !22     !60      !0.7    !            !               !  +------------+-------+-------+--------+-------+------------+---------------+  ! Prox ECA    !95.1   !11.8   !60      !0.88   !            !               !  +------------+-------+-------+--------+-------+------------+---------------+  ! Vertebral   !51.1   !11     !60      !0.78   !            !               !  +------------+-------+-------+--------+-------+------------+---------------+      - There is antegrade vertebral flow noted on the right side.      - Additional Measurements:ICAPSV/CCAPSV 1.37. ICAEDV/CCAEDV 1.48. Carotid Left Measurements  +------------+-------+-------+--------+-------+------------+---------------+  ! Location    !PSV    !EDV    ! Angle   !RI     !%Stenosis   ! Tortuosity     !  +------------+-------+-------+--------+-------+------------+---------------+  ! Prox CCA    !84.1   !14.9   !60      !0.82   !            !               !  +------------+-------+-------+--------+-------+------------+---------------+  ! Mid CCA     !88.5   !14.7   !60      !0.83   !

## 2018-09-27 NOTE — DISCHARGE INSTR - DIET

## 2018-09-27 NOTE — DISCHARGE SUMMARY
no peripheral edema  Gastrointestinal: Abdomen soft, nontender, no guarding or masses  Neurologic: Alert, 34/5 weakness right lower extremity  Skin: Warm and dry. No rash.       Disposition: SNF    In process/preliminary results:  Outstanding Order Results     Date and Time Order Name Status Description    9/23/2018 2046 Culture Blood #2 Preliminary     9/23/2018 1852 Culture Blood #1 Preliminary           Patient Instructions:   Current Discharge Medication List      START taking these medications    Details   ondansetron (ZOFRAN-ODT) 4 MG disintegrating tablet Take 1 tablet by mouth every 8 hours as needed for Nausea or Vomiting  Qty: 10 tablet, Refills: 0      clopidogrel (PLAVIX) 75 MG tablet Take 1 tablet by mouth daily  Qty: 30 tablet, Refills: 3      vitamin D (ERGOCALCIFEROL) 04490 units capsule Take 1 capsule by mouth once a week  Qty: 4 capsule, Refills: 0         CONTINUE these medications which have CHANGED    Details   atorvastatin (LIPITOR) 10 MG tablet Take 1 tablet by mouth nightly  Qty: 30 tablet, Refills: 3      lisinopril (PRINIVIL;ZESTRIL) 10 MG tablet Take 1 tablet by mouth daily  Qty: 30 tablet, Refills: 3      insulin lispro (HUMALOG) 100 UNIT/ML injection vial Inject 0-6 Units into the skin 3 times daily (with meals)  Qty: 1 vial, Refills: 3      escitalopram (LEXAPRO) 5 MG tablet Take 1 tablet by mouth daily  Qty: 30 tablet, Refills: 3         CONTINUE these medications which have NOT CHANGED    Details   acetaminophen 650 MG TABS Take 650 mg by mouth every 4 hours as needed  Qty: 120 tablet, Refills: 3         STOP taking these medications       aspirin 325 MG EC tablet Comments:   Reason for Stopping:         ondansetron (ZOFRAN) 4 MG/2ML injection Comments:   Reason for Stopping:         docusate sodium (COLACE, DULCOLAX) 100 MG CAPS Comments:   Reason for Stopping:         pantoprazole (PROTONIX) 40 MG tablet Comments:   Reason for Stopping:             Activity: activity as

## 2018-09-28 ENCOUNTER — LAB REQUISITION (OUTPATIENT)
Dept: LAB | Facility: HOSPITAL | Age: 83
End: 2018-09-28

## 2018-09-28 DIAGNOSIS — Z00.00 ENCOUNTER FOR GENERAL ADULT MEDICAL EXAMINATION WITHOUT ABNORMAL FINDINGS: ICD-10-CM

## 2018-09-28 LAB
25(OH)D3 SERPL-MCNC: 23.7 NG/ML (ref 30–100)
ALBUMIN SERPL-MCNC: 3.4 G/DL (ref 3.5–5)
ALBUMIN/GLOB SERPL: 0.9 G/DL (ref 1.1–2.5)
ALP SERPL-CCNC: 71 U/L (ref 24–120)
ALT SERPL W P-5'-P-CCNC: 48 U/L (ref 0–54)
ANION GAP SERPL CALCULATED.3IONS-SCNC: 9 MMOL/L (ref 4–13)
ARTICHOKE IGE QN: 98 MG/DL (ref 0–99)
AST SERPL-CCNC: 61 U/L (ref 7–45)
BASOPHILS # BLD AUTO: 0.04 10*3/MM3 (ref 0–0.2)
BASOPHILS NFR BLD AUTO: 0.5 % (ref 0–2)
BILIRUB SERPL-MCNC: 0.8 MG/DL (ref 0.1–1)
BLOOD CULTURE, ROUTINE: NORMAL
BUN BLD-MCNC: 18 MG/DL (ref 5–21)
BUN/CREAT SERPL: 25 (ref 7–25)
CALCIUM SPEC-SCNC: 9.8 MG/DL (ref 8.4–10.4)
CHLORIDE SERPL-SCNC: 109 MMOL/L (ref 98–110)
CHOLEST SERPL-MCNC: 141 MG/DL (ref 130–200)
CO2 SERPL-SCNC: 28 MMOL/L (ref 24–31)
CREAT BLD-MCNC: 0.72 MG/DL (ref 0.5–1.4)
CULTURE, BLOOD 2: NORMAL
DEPRECATED RDW RBC AUTO: 41.7 FL (ref 40–54)
EKG P AXIS: 31 DEGREES
EKG P-R INTERVAL: 132 MS
EKG Q-T INTERVAL: 416 MS
EKG QRS DURATION: 92 MS
EKG QTC CALCULATION (BAZETT): 435 MS
EKG T AXIS: -26 DEGREES
EOSINOPHIL # BLD AUTO: 0.33 10*3/MM3 (ref 0–0.7)
EOSINOPHIL NFR BLD AUTO: 4.5 % (ref 0–4)
ERYTHROCYTE [DISTWIDTH] IN BLOOD BY AUTOMATED COUNT: 13.2 % (ref 12–15)
GFR SERPL CREATININE-BSD FRML MDRD: 93 ML/MIN/1.73
GLOBULIN UR ELPH-MCNC: 3.6 GM/DL
GLUCOSE BLD-MCNC: 104 MG/DL (ref 70–100)
HBA1C MFR BLD: 5.5 %
HCT VFR BLD AUTO: 36 % (ref 37–47)
HDLC SERPL-MCNC: 31 MG/DL
HGB BLD-MCNC: 12.2 G/DL (ref 12–16)
IMM GRANULOCYTES # BLD: 0.03 10*3/MM3 (ref 0–0.03)
IMM GRANULOCYTES NFR BLD: 0.4 % (ref 0–5)
LDLC/HDLC SERPL: 3.08 {RATIO}
LYMPHOCYTES # BLD AUTO: 3.34 10*3/MM3 (ref 0.72–4.86)
LYMPHOCYTES NFR BLD AUTO: 45.5 % (ref 15–45)
MAGNESIUM SERPL-MCNC: 2.1 MG/DL (ref 1.4–2.2)
MCH RBC QN AUTO: 29.7 PG (ref 28–32)
MCHC RBC AUTO-ENTMCNC: 33.9 G/DL (ref 33–36)
MCV RBC AUTO: 87.6 FL (ref 82–98)
MONOCYTES # BLD AUTO: 0.56 10*3/MM3 (ref 0.19–1.3)
MONOCYTES NFR BLD AUTO: 7.6 % (ref 4–12)
NEUTROPHILS # BLD AUTO: 3.04 10*3/MM3 (ref 1.87–8.4)
NEUTROPHILS NFR BLD AUTO: 41.5 % (ref 39–78)
NRBC BLD MANUAL-RTO: 0 /100 WBC (ref 0–0)
PLATELET # BLD AUTO: 192 10*3/MM3 (ref 130–400)
PMV BLD AUTO: 10.9 FL (ref 6–12)
POTASSIUM BLD-SCNC: 4.6 MMOL/L (ref 3.5–5.3)
PREALB SERPL-MCNC: 11.3 MG/DL (ref 18–36)
PROT SERPL-MCNC: 7 G/DL (ref 6.3–8.7)
RBC # BLD AUTO: 4.11 10*6/MM3 (ref 4.2–5.4)
SODIUM BLD-SCNC: 146 MMOL/L (ref 135–145)
T4 FREE SERPL-MCNC: 1.37 NG/DL (ref 0.78–2.19)
TRIGL SERPL-MCNC: 73 MG/DL (ref 0–149)
TSH SERPL DL<=0.05 MIU/L-ACNC: 1.57 MIU/ML (ref 0.47–4.68)
VIT B12 BLD-MCNC: 875 PG/ML (ref 239–931)
WBC NRBC COR # BLD: 7.34 10*3/MM3 (ref 4.8–10.8)

## 2018-09-28 PROCEDURE — 84439 ASSAY OF FREE THYROXINE: CPT | Performed by: INTERNAL MEDICINE

## 2018-09-28 PROCEDURE — 80053 COMPREHEN METABOLIC PANEL: CPT | Performed by: INTERNAL MEDICINE

## 2018-09-28 PROCEDURE — 80061 LIPID PANEL: CPT | Performed by: INTERNAL MEDICINE

## 2018-09-28 PROCEDURE — 36415 COLL VENOUS BLD VENIPUNCTURE: CPT | Performed by: INTERNAL MEDICINE

## 2018-09-28 PROCEDURE — 83735 ASSAY OF MAGNESIUM: CPT | Performed by: INTERNAL MEDICINE

## 2018-09-28 PROCEDURE — 82607 VITAMIN B-12: CPT | Performed by: INTERNAL MEDICINE

## 2018-09-28 PROCEDURE — 85025 COMPLETE CBC W/AUTO DIFF WBC: CPT | Performed by: INTERNAL MEDICINE

## 2018-09-28 PROCEDURE — 84134 ASSAY OF PREALBUMIN: CPT | Performed by: INTERNAL MEDICINE

## 2018-09-28 PROCEDURE — 83036 HEMOGLOBIN GLYCOSYLATED A1C: CPT | Performed by: INTERNAL MEDICINE

## 2018-09-28 PROCEDURE — 84443 ASSAY THYROID STIM HORMONE: CPT | Performed by: INTERNAL MEDICINE

## 2018-09-28 PROCEDURE — 82306 VITAMIN D 25 HYDROXY: CPT | Performed by: INTERNAL MEDICINE

## 2018-10-01 NOTE — ED PROVIDER NOTES
Subjective   Patient is 86 yo female states she was sleeping on her couch when she apparently fell off, or rolled off the couch. She has history of stroke and notes poor gait. She states she laid in the floor for a while, possibly a few hours, before grandson came over and checked on her. She has complaints of hip pain but has chronic hip pain and leg pain secondary to stroke. She denies hitting head or LOC. She denies back pain, loss of bowel or bladder control, no saddle anesthesia.         Fall   Mechanism of injury: fall    Injury location:  Leg  Leg injury location:  R hip and L hip  Incident location:  Home  Arrived directly from scene: no    Fall:     Fall occurred: rolled off couch.    Point of impact:  Unable to specify  Prior to arrival data:     Loss of consciousness: no    Associated symptoms: no abdominal pain, no back pain, no chest pain, no loss of consciousness, no neck pain, no seizures and no vomiting    Risk factors: no diabetes and no dialysis    Risk factors comment:  Positive for history of stroke and hypertension      Review of Systems   Constitutional: Negative for activity change, appetite change and chills.   HENT: Negative for congestion.    Respiratory: Negative for cough, chest tightness and shortness of breath.    Cardiovascular: Negative for chest pain.   Gastrointestinal: Negative for abdominal pain, constipation, diarrhea and vomiting.   Genitourinary: Negative for dysuria.   Musculoskeletal: Negative for back pain, joint swelling, myalgias, neck pain and neck stiffness.        Positive for hip pain   Skin: Negative for color change, pallor, rash and wound.   Neurological: Negative for seizures and loss of consciousness.       Past Medical History:   Diagnosis Date   • Hypertension    • Stroke (CMS/HCC)        No Known Allergies    History reviewed. No pertinent surgical history.    History reviewed. No pertinent family history.    Social History     Social History   • Marital  status:      Social History Main Topics   • Smoking status: Current Every Day Smoker     Packs/day: 0.50     Types: Cigarettes   • Alcohol use No   • Drug use: No     Other Topics Concern   • Not on file           Objective   Physical Exam   Constitutional: She is oriented to person, place, and time. She appears well-developed and well-nourished.   HENT:   Head: Normocephalic.   Right Ear: External ear normal.   Mouth/Throat: Oropharynx is clear and moist.   Eyes: Pupils are equal, round, and reactive to light. Conjunctivae and EOM are normal.   Neck: Normal range of motion. Neck supple.   Cardiovascular: Normal rate, regular rhythm, normal heart sounds and intact distal pulses.    Pulmonary/Chest: Effort normal and breath sounds normal.   Abdominal: Soft. Bowel sounds are normal.   Musculoskeletal: Normal range of motion.   Pain to palpation to the hips bilaterally without obvious deformity noted; neurovascular intact   Neurological: She is alert and oriented to person, place, and time.   Skin: Skin is warm and dry. Capillary refill takes less than 2 seconds.   Psychiatric: She has a normal mood and affect. Her behavior is normal. Judgment and thought content normal.   Nursing note and vitals reviewed.      Procedures           ED Course patient received IV fluids while in the er and reports feeling better. Dr. Estes reviewed case and agrees with treatment plan. Patient will need to have close follow up with pcp and closer monitoring at home. She will need to return with any acute or worsening sxs.                   MDM  Number of Diagnoses or Management Options  Fall, initial encounter: new and requires workup  Urinary tract infection without hematuria, site unspecified: new and requires workup     Amount and/or Complexity of Data Reviewed  Clinical lab tests: ordered and reviewed  Tests in the radiology section of CPT®: ordered and reviewed  Obtain history from someone other than the patient: yes  Discuss  the patient with other providers: yes    Risk of Complications, Morbidity, and/or Mortality  Presenting problems: moderate  Diagnostic procedures: moderate  Management options: moderate    Patient Progress  Patient progress: improved        Final diagnoses:   Fall, initial encounter   Urinary tract infection without hematuria, site unspecified            Clara Gu, PIOTR  10/01/18 1146       Clara Gu APRN  10/01/18 1147

## 2018-10-03 ENCOUNTER — LAB REQUISITION (OUTPATIENT)
Dept: LAB | Facility: HOSPITAL | Age: 83
End: 2018-10-03

## 2018-10-03 DIAGNOSIS — Z00.00 ENCOUNTER FOR GENERAL ADULT MEDICAL EXAMINATION WITHOUT ABNORMAL FINDINGS: ICD-10-CM

## 2018-10-03 LAB
ALBUMIN SERPL-MCNC: 3.3 G/DL (ref 3.5–5)
ALBUMIN/GLOB SERPL: 1 G/DL (ref 1.1–2.5)
ALP SERPL-CCNC: 60 U/L (ref 24–120)
ALT SERPL W P-5'-P-CCNC: 39 U/L (ref 0–54)
ANION GAP SERPL CALCULATED.3IONS-SCNC: 9 MMOL/L (ref 4–13)
AST SERPL-CCNC: 45 U/L (ref 7–45)
BASOPHILS # BLD AUTO: 0.03 10*3/MM3 (ref 0–0.2)
BASOPHILS NFR BLD AUTO: 0.4 % (ref 0–2)
BILIRUB SERPL-MCNC: 0.6 MG/DL (ref 0.1–1)
BUN BLD-MCNC: 18 MG/DL (ref 5–21)
BUN/CREAT SERPL: 21.4 (ref 7–25)
CALCIUM SPEC-SCNC: 9.3 MG/DL (ref 8.4–10.4)
CHLORIDE SERPL-SCNC: 105 MMOL/L (ref 98–110)
CO2 SERPL-SCNC: 27 MMOL/L (ref 24–31)
CREAT BLD-MCNC: 0.84 MG/DL (ref 0.5–1.4)
DEPRECATED RDW RBC AUTO: 43.3 FL (ref 40–54)
EOSINOPHIL # BLD AUTO: 0.22 10*3/MM3 (ref 0–0.7)
EOSINOPHIL NFR BLD AUTO: 2.9 % (ref 0–4)
ERYTHROCYTE [DISTWIDTH] IN BLOOD BY AUTOMATED COUNT: 13.3 % (ref 12–15)
GFR SERPL CREATININE-BSD FRML MDRD: 78 ML/MIN/1.73
GLOBULIN UR ELPH-MCNC: 3.4 GM/DL
GLUCOSE BLD-MCNC: 98 MG/DL (ref 70–100)
HCT VFR BLD AUTO: 33.9 % (ref 37–47)
HGB BLD-MCNC: 11.4 G/DL (ref 12–16)
IMM GRANULOCYTES # BLD: 0.03 10*3/MM3 (ref 0–0.03)
IMM GRANULOCYTES NFR BLD: 0.4 % (ref 0–5)
LYMPHOCYTES # BLD AUTO: 2.16 10*3/MM3 (ref 0.72–4.86)
LYMPHOCYTES NFR BLD AUTO: 28.8 % (ref 15–45)
MAGNESIUM SERPL-MCNC: 2.2 MG/DL (ref 1.4–2.2)
MCH RBC QN AUTO: 29.9 PG (ref 28–32)
MCHC RBC AUTO-ENTMCNC: 33.6 G/DL (ref 33–36)
MCV RBC AUTO: 89 FL (ref 82–98)
MONOCYTES # BLD AUTO: 0.89 10*3/MM3 (ref 0.19–1.3)
MONOCYTES NFR BLD AUTO: 11.9 % (ref 4–12)
NEUTROPHILS # BLD AUTO: 4.16 10*3/MM3 (ref 1.87–8.4)
NEUTROPHILS NFR BLD AUTO: 55.6 % (ref 39–78)
NRBC BLD MANUAL-RTO: 0 /100 WBC (ref 0–0)
PLATELET # BLD AUTO: 265 10*3/MM3 (ref 130–400)
PMV BLD AUTO: 10.2 FL (ref 6–12)
POTASSIUM BLD-SCNC: 4.7 MMOL/L (ref 3.5–5.3)
PROT SERPL-MCNC: 6.7 G/DL (ref 6.3–8.7)
RBC # BLD AUTO: 3.81 10*6/MM3 (ref 4.2–5.4)
SODIUM BLD-SCNC: 141 MMOL/L (ref 135–145)
WBC NRBC COR # BLD: 7.49 10*3/MM3 (ref 4.8–10.8)

## 2018-10-03 PROCEDURE — 85025 COMPLETE CBC W/AUTO DIFF WBC: CPT | Performed by: NURSE PRACTITIONER

## 2018-10-03 PROCEDURE — 83735 ASSAY OF MAGNESIUM: CPT | Performed by: NURSE PRACTITIONER

## 2018-10-03 PROCEDURE — 80053 COMPREHEN METABOLIC PANEL: CPT | Performed by: NURSE PRACTITIONER

## 2018-10-03 PROCEDURE — 36415 COLL VENOUS BLD VENIPUNCTURE: CPT | Performed by: NURSE PRACTITIONER

## 2018-10-17 ENCOUNTER — LAB REQUISITION (OUTPATIENT)
Dept: LAB | Facility: HOSPITAL | Age: 83
End: 2018-10-17

## 2018-10-17 DIAGNOSIS — Z00.00 ENCOUNTER FOR GENERAL ADULT MEDICAL EXAMINATION WITHOUT ABNORMAL FINDINGS: ICD-10-CM

## 2018-10-17 LAB
ALBUMIN SERPL-MCNC: 3.2 G/DL (ref 3.5–5)
ALBUMIN/GLOB SERPL: 0.9 G/DL (ref 1.1–2.5)
ALP SERPL-CCNC: 57 U/L (ref 24–120)
ALT SERPL W P-5'-P-CCNC: 34 U/L (ref 0–54)
ANION GAP SERPL CALCULATED.3IONS-SCNC: 9 MMOL/L (ref 4–13)
AST SERPL-CCNC: 41 U/L (ref 7–45)
BASOPHILS # BLD AUTO: 0.02 10*3/MM3 (ref 0–0.2)
BASOPHILS NFR BLD AUTO: 0.4 % (ref 0–2)
BILIRUB SERPL-MCNC: 0.7 MG/DL (ref 0.1–1)
BUN BLD-MCNC: 14 MG/DL (ref 5–21)
BUN/CREAT SERPL: 15.4 (ref 7–25)
CALCIUM SPEC-SCNC: 9.4 MG/DL (ref 8.4–10.4)
CHLORIDE SERPL-SCNC: 108 MMOL/L (ref 98–110)
CO2 SERPL-SCNC: 26 MMOL/L (ref 24–31)
CREAT BLD-MCNC: 0.91 MG/DL (ref 0.5–1.4)
DEPRECATED RDW RBC AUTO: 40.6 FL (ref 40–54)
EOSINOPHIL # BLD AUTO: 0.22 10*3/MM3 (ref 0–0.7)
EOSINOPHIL NFR BLD AUTO: 4 % (ref 0–4)
ERYTHROCYTE [DISTWIDTH] IN BLOOD BY AUTOMATED COUNT: 12.8 % (ref 12–15)
GFR SERPL CREATININE-BSD FRML MDRD: 71 ML/MIN/1.73
GLOBULIN UR ELPH-MCNC: 3.4 GM/DL
GLUCOSE BLD-MCNC: 94 MG/DL (ref 70–100)
HCT VFR BLD AUTO: 32.2 % (ref 37–47)
HGB BLD-MCNC: 11 G/DL (ref 12–16)
IMM GRANULOCYTES # BLD: 0.01 10*3/MM3 (ref 0–0.03)
IMM GRANULOCYTES NFR BLD: 0.2 % (ref 0–5)
LYMPHOCYTES # BLD AUTO: 2.31 10*3/MM3 (ref 0.72–4.86)
LYMPHOCYTES NFR BLD AUTO: 42.2 % (ref 15–45)
MAGNESIUM SERPL-MCNC: 1.9 MG/DL (ref 1.4–2.2)
MCH RBC QN AUTO: 29.8 PG (ref 28–32)
MCHC RBC AUTO-ENTMCNC: 34.2 G/DL (ref 33–36)
MCV RBC AUTO: 87.3 FL (ref 82–98)
MONOCYTES # BLD AUTO: 0.59 10*3/MM3 (ref 0.19–1.3)
MONOCYTES NFR BLD AUTO: 10.8 % (ref 4–12)
NEUTROPHILS # BLD AUTO: 2.32 10*3/MM3 (ref 1.87–8.4)
NEUTROPHILS NFR BLD AUTO: 42.4 % (ref 39–78)
NRBC BLD MANUAL-RTO: 0 /100 WBC (ref 0–0)
PLATELET # BLD AUTO: 141 10*3/MM3 (ref 130–400)
PMV BLD AUTO: 10.7 FL (ref 6–12)
POTASSIUM BLD-SCNC: 4.2 MMOL/L (ref 3.5–5.3)
PROT SERPL-MCNC: 6.6 G/DL (ref 6.3–8.7)
RBC # BLD AUTO: 3.69 10*6/MM3 (ref 4.2–5.4)
SODIUM BLD-SCNC: 143 MMOL/L (ref 135–145)
WBC NRBC COR # BLD: 5.47 10*3/MM3 (ref 4.8–10.8)

## 2018-10-17 PROCEDURE — 85025 COMPLETE CBC W/AUTO DIFF WBC: CPT | Performed by: INTERNAL MEDICINE

## 2018-10-17 PROCEDURE — 80053 COMPREHEN METABOLIC PANEL: CPT | Performed by: INTERNAL MEDICINE

## 2018-10-17 PROCEDURE — 36415 COLL VENOUS BLD VENIPUNCTURE: CPT | Performed by: INTERNAL MEDICINE

## 2018-10-17 PROCEDURE — 83735 ASSAY OF MAGNESIUM: CPT | Performed by: INTERNAL MEDICINE

## 2018-10-23 PROBLEM — E86.0 DEHYDRATION: Status: RESOLVED | Noted: 2018-09-23 | Resolved: 2018-10-23

## 2019-06-07 ENCOUNTER — OFFICE VISIT (OUTPATIENT)
Dept: INTERNAL MEDICINE | Facility: CLINIC | Age: 84
End: 2019-06-07

## 2019-06-07 VITALS
RESPIRATION RATE: 12 BRPM | OXYGEN SATURATION: 99 % | TEMPERATURE: 97.6 F | BODY MASS INDEX: 22.63 KG/M2 | HEIGHT: 64 IN | WEIGHT: 132.56 LBS | HEART RATE: 52 BPM | SYSTOLIC BLOOD PRESSURE: 188 MMHG | DIASTOLIC BLOOD PRESSURE: 106 MMHG

## 2019-06-07 DIAGNOSIS — Z86.73 HISTORY OF EMBOLIC STROKE: ICD-10-CM

## 2019-06-07 DIAGNOSIS — K21.9 GASTROESOPHAGEAL REFLUX DISEASE, ESOPHAGITIS PRESENCE NOT SPECIFIED: ICD-10-CM

## 2019-06-07 DIAGNOSIS — Z72.0 TOBACCO ABUSE: ICD-10-CM

## 2019-06-07 DIAGNOSIS — E55.9 VITAMIN D DEFICIENCY: ICD-10-CM

## 2019-06-07 DIAGNOSIS — I69.80 UNSPECIFIED SEQUELAE OF OTHER CEREBROVASCULAR DISEASE: ICD-10-CM

## 2019-06-07 DIAGNOSIS — I10 ESSENTIAL HYPERTENSION: ICD-10-CM

## 2019-06-07 DIAGNOSIS — E11.59 CONTROLLED TYPE 2 DIABETES MELLITUS WITH OTHER CIRCULATORY COMPLICATION, WITHOUT LONG-TERM CURRENT USE OF INSULIN (HCC): Primary | ICD-10-CM

## 2019-06-07 PROBLEM — E11.9 DIABETES MELLITUS TYPE II, CONTROLLED (HCC): Status: ACTIVE | Noted: 2019-06-07

## 2019-06-07 LAB — HBA1C MFR BLD: 5.9 %

## 2019-06-07 PROCEDURE — 99204 OFFICE O/P NEW MOD 45 MIN: CPT | Performed by: FAMILY MEDICINE

## 2019-06-07 PROCEDURE — 83036 HEMOGLOBIN GLYCOSYLATED A1C: CPT | Performed by: FAMILY MEDICINE

## 2019-06-07 RX ORDER — ATORVASTATIN CALCIUM 20 MG/1
1 TABLET, FILM COATED ORAL DAILY
Refills: 5 | COMMUNITY
Start: 2019-05-10 | End: 2019-06-07 | Stop reason: SDUPTHER

## 2019-06-07 RX ORDER — ERGOCALCIFEROL 1.25 MG/1
1 CAPSULE ORAL WEEKLY
Refills: 5 | COMMUNITY
Start: 2019-05-07

## 2019-06-07 RX ORDER — ATORVASTATIN CALCIUM 20 MG/1
20 TABLET, FILM COATED ORAL DAILY
Qty: 90 TABLET | Refills: 3 | Status: SHIPPED | OUTPATIENT
Start: 2019-06-07

## 2019-06-07 RX ORDER — LOSARTAN POTASSIUM 50 MG/1
1 TABLET ORAL DAILY
Refills: 5 | COMMUNITY
Start: 2019-05-10 | End: 2019-06-07 | Stop reason: SDUPTHER

## 2019-06-07 RX ORDER — LOSARTAN POTASSIUM 50 MG/1
50 TABLET ORAL DAILY
Qty: 90 TABLET | Refills: 3 | Status: SHIPPED | OUTPATIENT
Start: 2019-06-07

## 2019-06-07 NOTE — ASSESSMENT & PLAN NOTE
Refill losartan and atorvastatin and repeat lipid panel, discussed aspirin therapy but given her leg weakness and current use of walker and discussion of increased bleeding risk with falls the patient elects not to use baby aspirin therapy  -Referral to home health for nursing, PT, OT assistance

## 2019-06-07 NOTE — PATIENT INSTRUCTIONS
I have given you refills of your cholesterol medication (atorvastatin) as well as losartan for your blood pressure.     I have made a referral to home health today. Please see me back in 6 weeks.

## 2019-06-07 NOTE — PROGRESS NOTES
CC:   Chief Complaint   Patient presents with   • Establish Care     Patient reports right side numbness in arm and leg since her stroke   • Leg Pain       History:  Jade Royal is a 88 y.o. female who presents today for evaluation of the above problems.      A1c 5.9 today    Has a history of 2 strokes with left leg weakness and chronic left leg pain that involves the entire leg improved with ibuprofen.  She additionally has residual right arm weakness and numbness.    Her blood pressure is currently uncontrolled, supposed to be on losartan 50 mg daily but she has been out for quite a while, and is also not taking Lipitor.    She uses a walker for ambulation, and requests assistance with nursing at home.    ROS:  Review of Systems   Musculoskeletal: Positive for arthralgias and gait problem.   Neurological: Positive for weakness and numbness.   All other systems reviewed and are negative.      No Known Allergies  Past Medical History:   Diagnosis Date   • Aortic insufficiency    • Diabetes mellitus (CMS/HCC)    • GERD (gastroesophageal reflux disease)    • Hypertension    • Stroke (CMS/HCC)     L MCA, subacute lacunar   • Tobacco abuse      Past Surgical History:   Procedure Laterality Date   • HYSTERECTOMY       History reviewed. No pertinent family history.   reports that she has been smoking cigarettes.  She has been smoking about 0.50 packs per day. She has never used smokeless tobacco. She reports that she does not drink alcohol or use drugs.      Current Outpatient Medications:   •  atorvastatin (LIPITOR) 20 MG tablet, Take 1 tablet by mouth Daily., Disp: 90 tablet, Rfl: 3  •  ibuprofen (ADVIL,MOTRIN) 200 MG tablet, Take 200 mg by mouth Every 6 (Six) Hours As Needed for Mild Pain ., Disp: , Rfl:   •  vitamin D (ERGOCALCIFEROL) 75061 units capsule capsule, Take 1 capsule by mouth 1 (One) Time Per Week., Disp: , Rfl: 5  •  losartan (COZAAR) 50 MG tablet, Take 1 tablet by mouth Daily., Disp: 90 tablet, Rfl: 3  •  " naproxen sodium (ALEVE) 220 MG tablet, Take 220 mg by mouth As Needed., Disp: , Rfl:     OBJECTIVE:  BP (!) 188/106 (BP Location: Left arm, Patient Position: Sitting, Cuff Size: Adult)   Pulse 52   Temp 97.6 °F (36.4 °C) (Temporal)   Resp 12   Ht 162.6 cm (64\")   Wt 60.1 kg (132 lb 9 oz)   SpO2 99%   BMI 22.75 kg/m²    Physical Exam   Constitutional: She is oriented to person, place, and time. No distress.   HENT:   Head: Normocephalic and atraumatic.   Nose: Nose normal.   Eyes: Conjunctivae are normal. Right eye exhibits no discharge. Left eye exhibits no discharge. No scleral icterus.   Neck: No tracheal deviation present.   Cardiovascular: Normal rate, regular rhythm and normal heart sounds. Exam reveals no gallop and no friction rub.   Murmur: 2/6 systolic murmur without carotid radiation.  Pulmonary/Chest: Effort normal and breath sounds normal. No respiratory distress. She has no wheezes. She has no rales.   Neurological: She is alert and oriented to person, place, and time.   Skin: Skin is warm and dry. She is not diaphoretic. No pallor.   Psychiatric: She has a normal mood and affect. Her behavior is normal. Judgment and thought content normal.   Nursing note and vitals reviewed.      Assessment/Plan    Problem List Items Addressed This Visit        Cardiovascular and Mediastinum    Hypertension     Refill losartan, check CMP         Relevant Medications    losartan (COZAAR) 50 MG tablet    Other Relevant Orders    Comprehensive Metabolic Panel       Digestive    GERD (gastroesophageal reflux disease)     Patient reports that she does not have current symptoms of acid reflux, hold on filling PPI            Endocrine    Diabetes mellitus type II, controlled (CMS/[facility]) - Primary     Diet controlled         Relevant Orders    POC Glycosylated Hemoglobin (Hb A1C) (Completed)    Comprehensive Metabolic Panel    MicroAlbumin, Urine, Random - Urine, Clean Catch       Other    History of embolic stroke     " Refill losartan and atorvastatin and repeat lipid panel, discussed aspirin therapy but given her leg weakness and current use of walker and discussion of increased bleeding risk with falls the patient elects not to use baby aspirin therapy  -Referral to home health for nursing, PT, OT assistance         Relevant Medications    atorvastatin (LIPITOR) 20 MG tablet    Other Relevant Orders    Lipid panel    Ambulatory Referral to Home Health    Tobacco abuse     Not interested in quitting           Other Visit Diagnoses     Unspecified sequelae of other cerebrovascular disease         Relevant Orders    Lipid panel    Vitamin D deficiency        Relevant Orders    Vitamin D 25 hydroxy          Patient's Body mass index is 22.75 kg/m². BMI is within normal parameters. No follow-up required..      An After Visit Summary was printed and given to the patient at discharge.  Return in about 6 weeks (around 7/19/2019) for Recheck.         hTomas Torrez D.O.  Family Medicine  Osteopathic Neuromusculoskeletal Medicine  6/7/2019

## 2020-09-25 ENCOUNTER — HOSPITAL ENCOUNTER (OUTPATIENT)
Dept: WOUND CARE | Age: 85
Discharge: HOME | End: 2020-09-25
Payer: MEDICARE

## 2020-09-25 VITALS
HEART RATE: 45 BPM | RESPIRATION RATE: 20 BRPM | SYSTOLIC BLOOD PRESSURE: 197 MMHG | TEMPERATURE: 97.1 F | BODY MASS INDEX: 23.39 KG/M2 | WEIGHT: 132 LBS | HEIGHT: 63 IN | DIASTOLIC BLOOD PRESSURE: 68 MMHG

## 2020-09-25 DIAGNOSIS — L97.912 LEG ULCER, RIGHT, WITH FAT LAYER EXPOSED (HCC): ICD-10-CM

## 2020-09-25 LAB
ALBUMIN SERPL-MCNC: 4 G/DL (ref 3.5–5.2)
ALP BLD-CCNC: 68 U/L (ref 35–104)
ALT SERPL-CCNC: 16 U/L (ref 5–33)
ANION GAP SERPL CALCULATED.3IONS-SCNC: 10 MMOL/L (ref 7–19)
AST SERPL-CCNC: 26 U/L (ref 5–32)
BASOPHILS ABSOLUTE: 0 K/UL (ref 0–0.2)
BASOPHILS RELATIVE PERCENT: 0.5 % (ref 0–1)
BILIRUB SERPL-MCNC: 1.1 MG/DL (ref 0.2–1.2)
BUN BLDV-MCNC: 12 MG/DL (ref 8–23)
CALCIUM SERPL-MCNC: 10.1 MG/DL (ref 8.8–10.2)
CHLORIDE BLD-SCNC: 106 MMOL/L (ref 98–111)
CO2: 27 MMOL/L (ref 22–29)
CREAT SERPL-MCNC: 0.8 MG/DL (ref 0.5–0.9)
EOSINOPHILS ABSOLUTE: 0.2 K/UL (ref 0–0.6)
EOSINOPHILS RELATIVE PERCENT: 2.9 % (ref 0–5)
GFR AFRICAN AMERICAN: >59
GFR NON-AFRICAN AMERICAN: >60
GLUCOSE BLD-MCNC: 93 MG/DL (ref 74–109)
HBA1C MFR BLD: 5.4 % (ref 4–6)
HCT VFR BLD CALC: 38.7 % (ref 37–47)
HEMOGLOBIN: 13 G/DL (ref 12–16)
IMMATURE GRANULOCYTES #: 0 K/UL
LYMPHOCYTES ABSOLUTE: 1.6 K/UL (ref 1.1–4.5)
LYMPHOCYTES RELATIVE PERCENT: 29.1 % (ref 20–40)
MCH RBC QN AUTO: 30 PG (ref 27–31)
MCHC RBC AUTO-ENTMCNC: 33.6 G/DL (ref 33–37)
MCV RBC AUTO: 89.2 FL (ref 81–99)
MONOCYTES ABSOLUTE: 0.4 K/UL (ref 0–0.9)
MONOCYTES RELATIVE PERCENT: 6.5 % (ref 0–10)
NEUTROPHILS ABSOLUTE: 3.3 K/UL (ref 1.5–7.5)
NEUTROPHILS RELATIVE PERCENT: 60.8 % (ref 50–65)
PDW BLD-RTO: 12.8 % (ref 11.5–14.5)
PLATELET # BLD: 126 K/UL (ref 130–400)
PMV BLD AUTO: 11 FL (ref 9.4–12.3)
POTASSIUM SERPL-SCNC: 4.4 MMOL/L (ref 3.5–5)
PREALBUMIN: 15 MG/DL (ref 20–40)
RBC # BLD: 4.34 M/UL (ref 4.2–5.4)
SODIUM BLD-SCNC: 143 MMOL/L (ref 136–145)
TOTAL PROTEIN: 7.7 G/DL (ref 6.6–8.7)
VITAMIN D 25-HYDROXY: 24.1 NG/ML
WBC # BLD: 5.5 K/UL (ref 4.8–10.8)

## 2020-09-25 PROCEDURE — 84134 ASSAY OF PREALBUMIN: CPT

## 2020-09-25 PROCEDURE — 36415 COLL VENOUS BLD VENIPUNCTURE: CPT

## 2020-09-25 PROCEDURE — 83036 HEMOGLOBIN GLYCOSYLATED A1C: CPT

## 2020-09-25 PROCEDURE — 99213 OFFICE O/P EST LOW 20 MIN: CPT

## 2020-09-25 PROCEDURE — 82306 VITAMIN D 25 HYDROXY: CPT

## 2020-09-25 PROCEDURE — 99215 OFFICE O/P EST HI 40 MIN: CPT | Performed by: NURSE PRACTITIONER

## 2020-09-25 PROCEDURE — 97597 DBRDMT OPN WND 1ST 20 CM/<: CPT | Performed by: NURSE PRACTITIONER

## 2020-09-25 PROCEDURE — 97597 DBRDMT OPN WND 1ST 20 CM/<: CPT

## 2020-09-25 PROCEDURE — 85025 COMPLETE CBC W/AUTO DIFF WBC: CPT

## 2020-09-25 PROCEDURE — 80053 COMPREHEN METABOLIC PANEL: CPT

## 2020-09-25 RX ORDER — LOSARTAN POTASSIUM 50 MG/1
50 TABLET ORAL DAILY
Status: ON HOLD | COMMUNITY
Start: 2020-09-02 | End: 2021-08-13

## 2020-09-25 RX ORDER — ASPIRIN 81 MG/1
81 TABLET ORAL DAILY
COMMUNITY

## 2020-09-25 ASSESSMENT — VISUAL ACUITY: OU: 1

## 2020-09-25 NOTE — PROGRESS NOTES
Patient Care Team:  Unknown Provider Result (Inactive) as PCP - cSott Acharya MD as PCP - Cardiology (Cardiology)    TODAY'S DATE:  9/25/2020     HISTORY of PRESENTILLNESS HPI   Corry Iraheta is a 80 y.o. female who presents today for wound evaluation. 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 ([facility]) Z15.212    Essential hypertension I10    Type 2 diabetes mellitus with circulatory disorder ([facility]) E11.59    Tobacco dependence F17.200    Pneumonia J18.9    Cerebrovascular accident (CVA) due to thrombosis of cerebral artery ([facility]) I63.30    Type 2 diabetes mellitus without complication ([facility]) F95.5    Nonrheumatic aortic valve insufficiency I35.1    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 This started 3 month(s) ago. She believes this is not healing. She has been applying antibiotic ointment. She has not had  fever orchills. She has a history of HTN and diabetes.     Corry Iraheta is a 80 y.o. female with the following history reviewed and recorded in [facility]:    Current Outpatient Medications   Medication Sig Dispense Refill    aspirin 81 MG EC tablet Take 81 mg by mouth daily      losartan (COZAAR) 50 MG tablet Take 50 mg by mouth daily      vitamin D (ERGOCALCIFEROL) 96428 units capsule Take 1 capsule by mouth once a week 4 capsule 0    atorvastatin (LIPITOR) 10 MG tablet Take 1 tablet by mouth nightly (Patient taking differently: Take 20 mg by mouth nightly ) 30 tablet 3    acetaminophen 650 MG TABS Take 650 mg by mouth every 4 hours as needed 120 tablet 3     No current facility-administered medications for this encounter. Allergies: Patient has no known allergies. 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  Hiatal hernia     Hypertension     Other disorders of kidney and ureter in diseases classified elsewhere     Pneumonia     Stroke ([facility]     Tobacco abuse     Type 2 diabetes mellitus without complication ([facility] 21/38/4486       Past Surgical History:   Procedure Laterality Date    CARDIAC CATHETERIZATION  09/25/2003    EF greater 50%  Angiographically normal coronary arteries, Normal left ventricular systolic function, Mild left ventricular systolic function, Mild left ventricular diastolic dysfunction and mild systemic hypertesion.  COLONOSCOPY      EYE SURGERY      HYSTERECTOMY       Family History   Problem Relation Age of Onset    Diabetes Mother     Heart Disease Maternal Grandmother      Social History     Tobacco Use    Smoking status: Current Every Day Smoker     Packs/day: 0.50     Years: 70.00     Pack years: 35.00     Types: Cigarettes    Smokeless tobacco: Never Used   Substance Use Topics    Alcohol use: No         Review of Systems    Review of Systems   Skin: Positive for wound. All other systems reviewed and are negative.       All other review of systems are negative. Physical Exam    BP (!) 197/68   Pulse (!) 45   Temp 97.1 °F (36.2 °C) (Temporal)   Resp 20   Ht 5' 3\" (1.6 m)   Wt 132 lb (59.9 kg)   BMI 23.38 kg/m²     Physical Exam  Vitals signs reviewed. Exam conducted with a chaperone present. Constitutional:       Appearance: Normal appearance. She is normal weight. HENT:      Head: Normocephalic and atraumatic. Right Ear: External ear normal.      Left Ear: External ear normal.   Eyes:      General: Lids are normal. Lids are everted, no foreign bodies appreciated. Vision grossly intact. Gaze aligned appropriately. Neck:      Musculoskeletal: Normal range of motion. Cardiovascular:      Rate and Rhythm: Regular rhythm. Bradycardia present. Pulses: Normal pulses. Pulmonary:      Effort: Pulmonary effort is normal.      Breath sounds: Normal breath sounds. Abdominal:      General: Bowel sounds are normal.   Musculoskeletal:      Comments: Right sided weakness     Skin:     General: Skin is warm and dry. Capillary Refill: Capillary refill takes 2 to 3 seconds. Findings: Wound present. Neurological:      Mental Status: She is alert and oriented to person, place, and time. Psychiatric:         Mood and Affect: Mood normal.         Behavior: Behavior normal.         Thought Content: Thought content normal.         Judgment: Judgment normal.             Post Debridement Measurements and Assessment:    The patientspain is  Pain Type: Acute pain. Wound is has improved. Please refer to nursing measurements and assessment regarding wound pre and postdebridement.     Wound 09/25/20 Pretibial Right wound 1- right pretibial venous (Active)   Wound Image    09/25/20 0857   Wound Venous 09/25/20 0857   Dressing Status Old drainage 09/25/20 0857   Dressing Changed Changed/New 09/25/20 0857   Dressing/Treatment Hydrofera blue 09/25/20 0902   Wound Cleansed Soap and water 09/25/20 0857   Wound Length (cm) 1.8 cm 09/25/20 0857 Wound Width (cm) 1 cm 09/25/20 0857   Wound Depth (cm) 0.1 cm 09/25/20 0857   Wound Surface Area (cm^2) 1.8 cm^2 09/25/20 0857   Wound Volume (cm^3) 0.18 cm^3 09/25/20 0857   Post-Procedure Length (cm) 1.8 cm 09/25/20 0902   Post-Procedure Width (cm) 1 cm 09/25/20 0902   Post-Procedure Depth (cm) 0.1 cm 09/25/20 0902   Post-Procedure Surface Area (cm^2) 1.8 cm^2 09/25/20 0902   Post-Procedure Volume (cm^3) 0.18 cm^3 09/25/20 0902   Wound Assessment Slough; Yellow;Red;Pink 09/25/20 0857   Drainage Amount Moderate 09/25/20 0857   Drainage Description Serosanguinous 09/25/20 0857   Odor None 09/25/20 0857   Margins Attached edges 09/25/20 0857   Brook-wound Assessment Intact 09/25/20 0857   Non-staged Wound Description Full thickness 09/25/20 0857   Red%Wound Bed 25 09/25/20 0857   Yellow%Wound Bed 75 09/25/20 0857   Number of days: 0            Debridement: Selective Debridement/Non-Excisional Debridement    Using curette the wound(s)/ulcer(s) was/were sharply debrided down through and including the removal of epidermis and dermis. Devitalized Tissue Debrided:  fibrin, biofilm, slough and exudate    Pre Debridement Measurements:  Are located in the Wound/Ulcer Documentation Flow Sheet    Wound/Ulcer #: 1    Post Debridement Measurements:  Wound/Ulcer Descriptions are Pre Debridement except measurements:          Percent of Wound(s)/Ulcer(s) Debrided: 100%    Total Surface Area Debrided:  1.8 sq cm     Diabetic/Pressure/Non Pressure Ulcers only:  Ulcer: Non-Pressure ulcer, fat layer exposed     Estimated Blood Loss:  Minimal    Hemostasis Achieved:  by pressure    Procedural Pain:  9  / 10     Post Procedural Pain:  0 / 10     Response to treatment:  Well tolerated by patient., With complaints of pain. Assessment    1. Leg ulcer, right, with fat layer exposed ([address]          Plan    1.  NICHOLE  2. Lab work    Planfor wound - Dress per physician order  Treatment:     Compression : Yes   Offloading : No   Dressing :

## 2020-09-25 NOTE — HOME CARE
[address] Ang [address]:     [facility]  [address], [place] [address] [id number] [phone]  WOUND CARE Dept: [address] [phone]    Patient Information:      Magalie Berry  [address]    : 1930  AGE: 80 y.o. GENDER: female   EPISODE DATE: 2020    Insurance:      PRIMARY INSURANCE:  Plan: [facility]"[employer] [facility]   Coverage: Colleen Wood  Effective Date: 2018  Group Number: [unfilled]  Subscriber Number: [id number] - (Medicare Managed)    Payor/Plan Subscr  Sex Relation Sub. Ins. ID Effective Group Num   1. [address] [facility] 1930 Female Self [id number]                                    [address] [address]   2.  [id number] [facility] 1930 Female Self [id number]                                   [address] [address]       Patient Wound Information:      Problem List Items Addressed This Visit     * (Principal) Leg ulcer, right, with fat layer exposed ([address] - Primary    Relevant Orders    VL LOWER EXTREMITY ARTERIAL SEGMENTAL PRESSURES W PPG    CBC Auto Differential    Comprehensive Metabolic Panel    Prealbumin    Hemoglobin A1C    Vitamin D 25 Hydroxy          WOUNDS REQUIRING DRESSING SUPPLIES:     Wound 20 Pretibial Right wound 1- right pretibial venous (Active)   Wound Image    20 0857   Wound Venous 20 0857   Dressing Status Old drainage 20 0857   Dressing Changed Changed/New 20 0857   Dressing/Treatment Hydrofera blue 20 0902   Wound Cleansed Soap and water 20 0857   Wound Length (cm) 1.8 cm 20 0857   Wound Width (cm) 1 cm 20 0857   Wound Depth (cm) 0.1 cm 20 0857   Wound Surface Area (cm^2) 1.8 cm^2 20 0857   Wound Volume (cm^3) 0.18 cm^3 20 0857   Post-Procedure Length (cm) 1.8 cm 20 0902   Post-Procedure Width (cm) 1 cm 09/25/20 0902   Post-Procedure Depth (cm) 0.1 cm 09/25/20 0902   Post-Procedure Surface Area (cm^2) 1.8 cm^2 09/25/20 0902   Post-Procedure Volume (cm^3) 0.18 cm^3 09/25/20 0902   Wound Assessment Slough; Yellow;Red;Pink 09/25/20 0857   Drainage Amount Moderate 09/25/20 0857   Drainage Description Serosanguinous 09/25/20 0857   Odor None 09/25/20 0857   Margins Attached edges 09/25/20 0857   Brook-wound Assessment Intact 09/25/20 0857   Non-staged Wound Description Full thickness 09/25/20 0857   Red%Wound Bed 25 09/25/20 0857   Yellow%Wound Bed 75 09/25/20 0857   Number of days: 0          Supplies Requested :      WOUND #: 1   PRIMARY DRESSING:  Other: hydrafera blue ready BORDER   Cover and Secure with: None     FREQUENCY OF DRESSING CHANGES:  Daily       ADDITIONAL ITEMS:  [] Gloves Small  [] Gloves Medium [x] Gloves Large [] Gloves Nii Pott  [] Tape 1\" [] Tape 2\" [] Tape 3\"  [] Medipore Tape  [x] Saline  [] Skin Prep   [] Adhesive Remover   [] Cotton Tip Applicators   [] Other:    Patient Wound(s) Debrided: [x] Yes if yes please add date 9/26/20   [] No    Debribement Type: Excisional/Sharp and Mechanical     Patient currently being seen by Home Health: [] Yes   [x] No    Duration for needed supplies:  []15  [x]30  []60  []90 Days    Electronically signed by CHERELLE Hooper CNP on 9/25/2020 at 9:27 AM     Provider Information:      PROVIDER'S NAME: Tiarra VILLARREAL    NPI: [id number]

## 2020-09-30 ENCOUNTER — HOSPITAL ENCOUNTER (OUTPATIENT)
Dept: NON INVASIVE DIAGNOSTICS | Age: 85
Discharge: HOME | End: 2020-09-30
Payer: MEDICARE

## 2020-09-30 ENCOUNTER — HOSPITAL ENCOUNTER (OUTPATIENT)
Dept: WOUND CARE | Age: 85
Discharge: HOME | End: 2020-09-30
Payer: MEDICARE

## 2020-09-30 VITALS
WEIGHT: 132 LBS | SYSTOLIC BLOOD PRESSURE: 172 MMHG | BODY MASS INDEX: 23.39 KG/M2 | RESPIRATION RATE: 16 BRPM | HEART RATE: 45 BPM | TEMPERATURE: 98 F | DIASTOLIC BLOOD PRESSURE: 70 MMHG | HEIGHT: 63 IN

## 2020-09-30 PROBLEM — E44.0 MODERATE PROTEIN-CALORIE MALNUTRITION (HCC): Status: ACTIVE | Noted: 2020-09-30

## 2020-09-30 PROBLEM — Z91.199 NON-COMPLIANCE: Status: ACTIVE | Noted: 2020-09-30

## 2020-09-30 PROCEDURE — 97597 DBRDMT OPN WND 1ST 20 CM/<: CPT

## 2020-09-30 PROCEDURE — 97597 DBRDMT OPN WND 1ST 20 CM/<: CPT | Performed by: NURSE PRACTITIONER

## 2020-09-30 NOTE — PROGRESS NOTES
Pack years: 35.00     Types: Cigarettes    Smokeless tobacco: Never Used   Substance Use Topics    Alcohol use: No    Drug use: No       ALLERGIES    No Known Allergies    MEDICATIONS    Current Outpatient Medications on File Prior to Encounter   Medication Sig Dispense Refill    aspirin 81 MG EC tablet Take 81 mg by mouth daily      losartan (COZAAR) 50 MG tablet Take 50 mg by mouth daily      vitamin D (ERGOCALCIFEROL) 80364 units capsule Take 1 capsule by mouth once a week 4 capsule 0    atorvastatin (LIPITOR) 10 MG tablet Take 1 tablet by mouth nightly (Patient taking differently: Take 20 mg by mouth nightly ) 30 tablet 3    acetaminophen 650 MG TABS Take 650 mg by mouth every 4 hours as needed 120 tablet 3     No current facility-administered medications on file prior to encounter. REVIEW OF SYSTEMS    A comprehensive review of systems was negative.     Objective:      BP (!) 172/70   Pulse (!) 45   Temp 98 °F (36.7 °C) (Temporal)   Resp 16   Ht 5' 3\" (1.6 m)   Wt 132 lb (59.9 kg)   BMI 23.38 kg/m²     Wt Readings from Last 3 Encounters:   09/30/20 132 lb (59.9 kg)   09/25/20 132 lb (59.9 kg)   09/23/18 130 lb (59 kg)       PHYSICAL EXAM    General Appearance: alert and oriented to person, place and time, well developed and well- nourished, in no acute distress  Skin: warm and dry, no rash or erythema  Head: normocephalic and atraumatic  Eyes: pupils equal, round, and reactive to light, extraocular eye movements intact, conjunctivae normal  ENT: tympanic membrane, external ear and ear canal normal bilaterally, nose without deformity, nasal mucosa and turbinates normal without polyps  Neck: supple and non-tender without mass, no thyromegaly or thyroid nodules, no cervical lymphadenopathy  Pulmonary/Chest: clear to auscultation bilaterally- no wheezes, rales or rhonchi, normal air movement, no respiratory distress  Extremities: no cyanosis, clubbing or edema  Musculoskeletal: normal range of motion, no joint swelling, deformity or tenderness  Neurologic: reflexes normal and symmetric, no cranial nerve deficit, gait, coordination and speech normal      Assessment:      Patient Active Problem List   Diagnosis Code    Chest discomfort R07.89    Bradycardia R00.1    Hiatal hernia K44.9    Weakness generalized R53.1    Abdominal pain affecting pregnancy, antepartum O26.899, R10.9    Stroke ([facility]) I63.9    Generalized weakness R53.1    Aortic insufficiency I35.1    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 ([facility]) W33.572    Essential hypertension I10    Type 2 diabetes mellitus with circulatory disorder ([facility]) E11.59    Tobacco dependence F17.200    Pneumonia J18.9    Cerebrovascular accident (CVA) due to thrombosis of cerebral artery ([facility]) I63.30    Type 2 diabetes mellitus without complication ([facility]) P79.9    Nonrheumatic aortic valve insufficiency I35.1    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 Performed by: CHERELLE Dooley CNP    Consent obtained:  Yes    Time out taken:  Yes    Pain Control:         Debridement:Non-excisional Debridement    Using curette the wound(s)/ulcer(s) was/were sharply debrided down through and including the removal of epidermis and dermis.         Devitalized Tissue Debrided:  fibrin, biofilm, slough and exudate    Pre Debridement Measurements:  Are located in the [facility]  Documentation Flow Sheet    Wound/Ulcer #: 1    Post Debridement Measurements:  Wound/Ulcer Descriptions are Pre Debridement except measurements:    Wound 09/25/20 Pretibial Right wound 1- right pretibial venous (Active)   Wound Image   09/30/20 1046   Wound Venous 09/30/20 1046   Dressing Status Old drainage 09/25/20 0857   Dressing Changed Changed/New 09/25/20 0857   Dressing/Treatment Hydrofera blue 09/25/20 0902   Wound Cleansed Soap and water 09/30/20 1046   Wound Length (cm) 1.5 cm 09/30/20 1046   Wound Width (cm) 0.7 cm 09/30/20 1046   Wound Depth (cm) 0.1 cm 09/30/20 1046   Wound Surface Area (cm^2) 1.05 cm^2 09/30/20 1046   Change in Wound Size % (l*w) 41.67 09/30/20 1046   Wound Volume (cm^3) 0.1 cm^3 09/30/20 1046   Wound Healing % 44 09/30/20 1046   Post-Procedure Length (cm) 1.5 cm 09/30/20 1054   Post-Procedure Width (cm) 0.7 cm 09/30/20 1054   Post-Procedure Depth (cm) 0.1 cm 09/30/20 1054   Post-Procedure Surface Area (cm^2) 1.05 cm^2 09/30/20 1054   Post-Procedure Volume (cm^3) 0.1 cm^3 09/30/20 1054   Distance Tunneling (cm) 0 cm 09/30/20 1046   Tunneling Position ___ O'Clock 0 09/30/20 1046   Undermining Starts ___ O'Clock 0 09/30/20 1046   Undermining Ends___ O'Clock 0 09/30/20 1046   Undermining Maxium Distance (cm) 0 09/30/20 1046   Wound Assessment Slough; Yellow;Red;Pink 09/30/20 1046   Drainage Amount Moderate 09/30/20 1046   Drainage Description Serosanguinous 09/30/20 1046   Odor None 09/30/20 1046   Margins Attached edges 09/30/20 1046   Brook-wound Assessment Intact 09/30/20 1046   Non-staged Wound Description Full thickness 09/30/20 1046   [place]%Wound Bed 25 09/30/20 1046   Red%Wound Bed 25 09/30/20 1046   Yellow%Wound Bed 50 09/30/20 1046   Black%Wound Bed 0 09/30/20 1046   Purple%Wound Bed 0 09/30/20 1046   Number of days: 5            Percent of Wound/Ulcer Debrided: 100%    Total Surface Area Debrided:  1.05 sq cm       Diabetic/Pressure/Non Pressure Ulcers only:  Ulcer: Non-Pressure ulcer, fat layer exposed      Estimated Blood Loss:  Minimal    Hemostasis Achieved:  by pressure    Procedural Pain:  10  / 10     Post Procedural Pain:  0 / 10     Response to treatment:  Well tolerated by patient. Plan:     Problem List Items Addressed This Visit     Tobacco dependence (Chronic)    Weakness generalized    Relevant Orders    Internal Referral to Home Health    Leg ulcer, right, with fat layer exposed ([facility] - Primary    Relevant Medications    Lidocaine 2 % GEL (Start on 9/30/2020 11:45 AM)    Other Relevant Orders    Internal Referral to Home Health    Supply: Wound Cleanser    Supply: Brook Wound    Supply: Wound Dressings    Supply: Pack Wound    Supply: Cover and Secure    Supply: Edema Control    Non-compliance    Moderate protein-calorie malnutrition ([address]          Treatment Note please see attached Discharge Instructions    In my professional opinion this patient would benefit from HBO Therapy: No    Written patient dismissal instructions given to patient and signed by patient or POA. Ms. Rosalina Gracia has admitted to non-compliance. She has not been wearing a dressing, continues to smoke, and does not attempt to increase her protein. Her leg is not staying elevated. I put a coflex on her and will get home health to see her, hopefully we can force compliance and monitor her closer in heal her up.   Electronically signed by CHERELLE Wang CNP on 9/30/2020 at 11:24 AM

## 2020-09-30 NOTE — PROGRESS NOTES
[employer] Application   Below Knee    NAME:  Patricia Harrison  YOB: 1930  MEDICAL RECORD NUMBER:  [id number]  DATE:  9/30/2020     [x] Applied moisturizing agent to dry skin as needed.  [x] Appied primary and secondary dressing as ordered     [x] Applied Unna roll from toes to knee overlapping each time.  [x] Applied ace wrap or coban from toes to below the knee.  [x] Instructed patient/caregiver to keep dressing dry and intact. DO NOT REMOVE DRESSING.  [x] Instructed pt/family/caregiver to report excessive draining, loose bandage, wet dressing, severe pain or tingling in toes.  [x] Applied [employer] dressing below the knee to Right lower leg(s)    [x]  Unna Boot(s) were applied per  Guidelines.      Electronically signed by Rashaad Muhammad RN on 9/30/2020 at 11:25 AM

## 2020-10-01 RX ORDER — ATORVASTATIN CALCIUM 20 MG/1
20 TABLET, FILM COATED ORAL DAILY
Status: ON HOLD | COMMUNITY
End: 2021-08-13

## 2020-10-12 ENCOUNTER — HOSPITAL ENCOUNTER (OUTPATIENT)
Dept: WOUND CARE | Age: 85
Discharge: HOME | End: 2020-10-12

## 2020-10-22 ENCOUNTER — HOSPITAL ENCOUNTER (OUTPATIENT)
Dept: WOUND CARE | Age: 85
Discharge: HOME | End: 2020-10-22
Payer: MEDICARE

## 2020-10-22 VITALS
DIASTOLIC BLOOD PRESSURE: 88 MMHG | RESPIRATION RATE: 16 BRPM | BODY MASS INDEX: 23.39 KG/M2 | SYSTOLIC BLOOD PRESSURE: 183 MMHG | TEMPERATURE: 98.1 F | HEART RATE: 59 BPM | HEIGHT: 63 IN | WEIGHT: 132 LBS

## 2020-10-22 PROBLEM — L97.912 LEG ULCER, RIGHT, WITH FAT LAYER EXPOSED (HCC): Chronic | Status: ACTIVE | Noted: 2020-09-25

## 2020-10-22 PROCEDURE — 97597 DBRDMT OPN WND 1ST 20 CM/<: CPT | Performed by: SURGERY

## 2020-10-22 PROCEDURE — 97597 DBRDMT OPN WND 1ST 20 CM/<: CPT

## 2020-10-22 ASSESSMENT — PAIN DESCRIPTION - PAIN TYPE: TYPE: ACUTE PAIN

## 2020-10-22 ASSESSMENT — PAIN DESCRIPTION - PROGRESSION: CLINICAL_PROGRESSION: NOT CHANGED

## 2020-10-22 ASSESSMENT — PAIN DESCRIPTION - ONSET: ONSET: ON-GOING

## 2020-10-22 ASSESSMENT — PAIN SCALES - GENERAL: PAINLEVEL_OUTOF10: 0

## 2020-10-22 NOTE — PROGRESS NOTES
Grandmother        SOCIAL HISTORY    Social History     Tobacco Use    Smoking status: Current Every Day Smoker     Packs/day: 0.50     Years: 70.00     Pack years: 35.00     Types: Cigarettes    Smokeless tobacco: Never Used   Substance Use Topics    Alcohol use: No    Drug use: No       ALLERGIES    No Known Allergies    MEDICATIONS    Current Outpatient Medications on File Prior to Encounter   Medication Sig Dispense Refill    atorvastatin (LIPITOR) 20 MG tablet Take 20 mg by mouth daily      aspirin 81 MG EC tablet Take 81 mg by mouth daily      losartan (COZAAR) 50 MG tablet Take 50 mg by mouth daily      acetaminophen 650 MG TABS Take 650 mg by mouth every 4 hours as needed 120 tablet 3     No current facility-administered medications on file prior to encounter. REVIEW OF SYSTEMS    A comprehensive review of systems was negative.     Objective:      BP (!) 183/88   Pulse 59   Temp 98.1 °F (36.7 °C) (Temporal)   Resp 16   Ht 5' 3\" (1.6 m)   Wt 132 lb (59.9 kg)   BMI 23.38 kg/m²     Wt Readings from Last 3 Encounters:   10/22/20 132 lb (59.9 kg)   09/30/20 132 lb (59.9 kg)   09/25/20 132 lb (59.9 kg)       PHYSICAL EXAM    General Appearance: alert and oriented to person, place and time, well developed and well- nourished, in no acute distress  Skin: warm and dry, no rash or erythema  Head: normocephalic and atraumatic  Eyes: pupils equal, round, and reactive to light, extraocular eye movements intact, conjunctivae normal  ENT: tympanic membrane, external ear and ear canal normal bilaterally, nose without deformity, nasal mucosa and turbinates normal without polyps, lips teeth and gums normal  Neck: supple and non-tender without mass, no thyromegaly or thyroid nodules, no cervical lymphadenopathy  Pulmonary/Chest: clear to auscultation bilaterally- no wheezes, rales or rhonchi, normal air movement, no respiratory distress  Cardiovascular: normal rate, regular rhythm, normal S1 and S2, no Tobacco dependence (Chronic)    * (Principal) Leg ulcer, right, with fat layer exposed (HCC) (Chronic)    Relevant Medications    Lidocaine 2 % GEL (Start on 10/22/2020 10:00 AM)    Other Relevant Orders    Supply: Wound Cleanser    Supply: Wound Dressings    Supply: Cover and Secure    Supply: Edema Control    Non-compliance - Primary    Relevant Medications    Lidocaine 2 % GEL (Start on 10/22/2020 10:00 AM)    Other Relevant Orders    Supply: Wound Cleanser    Supply: Wound Dressings    Supply: Cover and Secure    Supply: Edema Control    Moderate protein-calorie malnutrition (HCC)    Relevant Medications    Lidocaine 2 % GEL (Start on 10/22/2020 10:00 AM)    Other Relevant Orders    Supply: Wound Cleanser    Supply: Wound Dressings    Supply: Cover and Secure    Supply: Edema Control          Treatment Note please see attached Discharge Instructions    In my professional opinion this patient would benefit from HBO Therapy: No    Written patient dismissal instructions given to patient and signed by patient or POA.          Discharge [address] and Hyperbaric Oxygen Therapy   Physician Orders and Discharge Instructions  [address] [facility]  [place]ty [place]   Telephone: [phone]    NAME:  Samantha Giles:  12/14/1930  MEDICAL RECORD NUMBER:  [id number]  DATE:  10/22/2020    Discharge condition: Stable    Discharge to: Home    Left via:Private automobile    Accompanied by: Family    EC[facility]: [facility] to change Christal Monk on Monday Wednesday and Friday    Dressing Orders: Right Lower Ext Ulcer  Xeroform to open areas  Copa to pad as needed, Coflex Unnaboot, Keep clean and Dry  Elevate feet to level or above heart 3-4 times daily and as needed to for 30 minutes to reduce swelling  Remove wraps and call office if- Wraps get wet, Cause increased pain, Slide down or you are unable to make your next scheduled

## 2020-11-02 ENCOUNTER — HOSPITAL ENCOUNTER (OUTPATIENT)
Dept: WOUND CARE | Age: 85
Discharge: HOME | End: 2020-11-02
Payer: MEDICARE

## 2020-11-02 VITALS
TEMPERATURE: 98.5 F | HEIGHT: 63 IN | SYSTOLIC BLOOD PRESSURE: 140 MMHG | WEIGHT: 121.5 LBS | DIASTOLIC BLOOD PRESSURE: 76 MMHG | HEART RATE: 81 BPM | BODY MASS INDEX: 21.53 KG/M2

## 2020-11-02 PROCEDURE — 97597 DBRDMT OPN WND 1ST 20 CM/<: CPT

## 2020-11-02 PROCEDURE — 97597 DBRDMT OPN WND 1ST 20 CM/<: CPT | Performed by: NURSE PRACTITIONER

## 2020-11-02 ASSESSMENT — PAIN DESCRIPTION - PROGRESSION: CLINICAL_PROGRESSION: NOT CHANGED

## 2020-11-02 ASSESSMENT — PAIN SCALES - GENERAL: PAINLEVEL_OUTOF10: 8

## 2020-11-02 ASSESSMENT — PAIN DESCRIPTION - FREQUENCY: FREQUENCY: INTERMITTENT

## 2020-11-02 ASSESSMENT — PAIN DESCRIPTION - ONSET: ONSET: ON-GOING

## 2020-11-02 ASSESSMENT — PAIN DESCRIPTION - PAIN TYPE: TYPE: ACUTE PAIN

## 2020-11-02 ASSESSMENT — PAIN DESCRIPTION - ORIENTATION: ORIENTATION: RIGHT

## 2020-11-02 ASSESSMENT — PAIN DESCRIPTION - DESCRIPTORS: DESCRIPTORS: SORE

## 2020-11-02 ASSESSMENT — PAIN DESCRIPTION - LOCATION: LOCATION: LEG

## 2020-11-02 NOTE — PLAN OF CARE
[place] Application   Below Knee    NAME:  Elias Sinclair  YOB: 1930  MEDICAL RECORD NUMBER:  [id number]  DATE:  11/2/2020     [x] Applied moisturizing agent to dry skin as needed.  [x] Appied primary and secondary dressing as ordered     [x] Applied Unna roll from toes to knee overlapping each time.  [x] Applied ace wrap or coban from toes to below the knee. Fany Manley [x] Instructed patient/caregiver to keep dressing dry and intact. DO NOT REMOVE DRESSING.  [x] Instructed pt/family/caregiver to report excessive draining, loose bandage, wet dressing, severe pain or tingling in toes.  [x] Applied [place] dressing below the knee to Right lower leg(s)    [x]  Unna Boot(s) were applied per  Guidelines.      Electronically signed by Nisa Patel RN on 11/2/2020 at 11:26 AM

## 2020-11-02 NOTE — PROGRESS NOTES
35.00     Types: Cigarettes    Smokeless tobacco: Never Used   Substance Use Topics    Alcohol use: No    Drug use: No       ALLERGIES    No Known Allergies    MEDICATIONS    Current Outpatient Medications on File Prior to Encounter   Medication Sig Dispense Refill    atorvastatin (LIPITOR) 20 MG tablet Take 20 mg by mouth daily      aspirin 81 MG EC tablet Take 81 mg by mouth daily      losartan (COZAAR) 50 MG tablet Take 50 mg by mouth daily      acetaminophen 650 MG TABS Take 650 mg by mouth every 4 hours as needed 120 tablet 3     No current facility-administered medications on file prior to encounter. REVIEW OF SYSTEMS    A comprehensive review of systems was negative.     Objective:      BP (!) 140/76   Pulse 81   Temp 98.5 °F (36.9 °C) (Temporal)   Ht 5' 3\" (1.6 m)   Wt 121 lb 8 oz (55.1 kg)   BMI 21.52 kg/m²     Wt Readings from Last 3 Encounters:   11/02/20 121 lb 8 oz (55.1 kg)   10/22/20 132 lb (59.9 kg)   09/30/20 132 lb (59.9 kg)       PHYSICAL EXAM    General Appearance: alert and oriented to person, place and time, well developed and well- nourished, in no acute distress  Skin: warm and dry, no rash or erythema  Head: normocephalic and atraumatic  Eyes: pupils equal, round, and reactive to light, extraocular eye movements intact, conjunctivae normal  ENT: tympanic membrane, external ear and ear canal normal bilaterally, nose without deformity, nasal mucosa and turbinates normal without polyps  Neck: supple and non-tender without mass, no thyromegaly or thyroid nodules, no cervical lymphadenopathy  Pulmonary/Chest: clear to auscultation bilaterally- no wheezes, rales or rhonchi, normal air movement, no respiratory distress  Extremities: no cyanosis, clubbing or edema  Musculoskeletal: normal range of motion, no joint swelling, deformity or tenderness  Neurologic: reflexes normal and symmetric, no cranial nerve deficit, gait, coordination and speech normal      Assessment:      Patient Active Problem List   Diagnosis Code    Chest discomfort R07.89    Bradycardia R00.1    Hiatal hernia K44.9    Weakness generalized R53.1    Abdominal pain affecting pregnancy, antepartum O26.899, R10.9    Stroke ([facility]) I63.9    Generalized weakness R53.1    Aortic insufficiency I35.1    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 ([facility]) X53.225    Essential hypertension I10    Type 2 diabetes mellitus with circulatory disorder ([facility]) E11.59    Tobacco dependence F17.200    Pneumonia J18.9    Cerebrovascular accident (CVA) due to thrombosis of cerebral artery ([facility]) I63.30    Type 2 diabetes mellitus without complication ([facility]) G01.8    Nonrheumatic aortic valve insufficiency I35.1    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 Performed by: CHERELLE Ni CNP    Consent obtained:  Yes    Time out taken:  Yes    Pain Control: Anesthetic  Anesthetic: 2% Lidocaine Gel Topical       Debridement:Non-excisional Debridement    Using curette the wound(s)/ulcer(s) was/were sharply debrided down through and including the removal of epidermis and dermis.         Devitalized Tissue Debrided:  fibrin, biofilm, slough and exudate    Pre Debridement Measurements:  Are located in the Wound/Ulcer Documentation Flow Sheet    Wound/Ulcer #: 1    Post Debridement Measurements:  Wound/Ulcer Descriptions are Pre Debridement except measurements:      Percent of Wound/Ulcer Debrided: 100%    Total Surface Area Debrided:  0.72 sq cm Wound 09/25/20 Pretibial Right wound 1- right pretibial venous (Active)   Wound Image    11/02/20 1053   Wound Etiology Venous 11/02/20 1053   Dressing Status Old drainage noted 11/02/20 1053   Wound Cleansed Soap and water 11/02/20 1053   Dressing/Treatment Xeroform 09/30/20 1054   Wound Length (cm) 1.2 cm 11/02/20 1053   Wound Width (cm) 0.6 cm 11/02/20 1053   Wound Depth (cm) 0.1 cm 11/02/20 1053   Wound Surface Area (cm^2) 0.72 cm^2 11/02/20 1053   Change in Wound Size % (l*w) 60 11/02/20 1053   Wound Volume (cm^3) 0.07 cm^3 11/02/20 1053   Wound Healing % 61 11/02/20 1053   Post-Procedure Length (cm) 1.2 cm 11/02/20 1053   Post-Procedure Width (cm) 0.6 cm 11/02/20 1053   Post-Procedure Depth (cm) 0.1 cm 11/02/20 1053   Post-Procedure Surface Area (cm^2) 0.72 cm^2 11/02/20 1053   Post-Procedure Volume (cm^3) 0.07 cm^3 11/02/20 1053   Distance Tunneling (cm) 0 cm 11/02/20 1053   Tunneling Position ___ O'Clock 0 11/02/20 1053   Undermining Starts ___ O'Clock 0 11/02/20 1053   Undermining Ends___ O'Clock 0 11/02/20 1053   Undermining Maxium Distance (cm) 0 11/02/20 1053   Wound Assessment Granulation tissue;Slough 11/02/20 1053   Drainage Amount Small 11/02/20 1053   Drainage Description Serosanguinous 11/02/20 1053   Odor None 11/02/20 1053   Brook-wound Assessment Hyperpigmented 11/02/20 1053   Margins Attached edges 11/02/20 1053   Wound Thickness Description not for Pressure Injury Full thickness 11/02/20 1053   Number of days: 38            Diabetic/Pressure/Non Pressure Ulcers only:  Ulcer: Non-Pressure ulcer, fat layer exposed      Estimated Blood Loss:  Minimal    Hemostasis Achieved:  by pressure    Procedural Pain:  9  / 10     Post Procedural Pain:  4 / 10     Response to treatment:  Well tolerated by patient.        Plan:     Problem List Items Addressed This Visit     Tobacco dependence (Chronic)    * (Principal) Leg ulcer, right, with fat layer exposed ([address] (Chronic)    Relevant Orders    Supply: Wound Cleanser    Supply: Brook Wound    Supply: Wound Dressings    Supply: Cover and Secure    Supply: Edema Control    Non-compliance - Primary    Relevant Orders    Supply: Wound Cleanser    Supply: Brook Wound    Supply: Wound Dressings    Supply: Cover and Secure    Supply: Edema Control    Moderate protein-calorie malnutrition (HCC)    Relevant Orders    Supply: Wound Cleanser    Supply: Brook Wound    Supply: Wound Dressings    Supply: Cover and Secure    Supply: Edema Control          Treatment Note please see attached Discharge Instructions    In my professional opinion this patient would benefit from HBO Therapy: No    Written patient dismissal instructions given to patient and signed by patient or POA. Ms. Jose Calderon is healing well but complaining of pain. We will follow up in 1 week with Dr. Leia Calhoun.   Electronically signed by CHERELLE Guzmán CNP on 11/2/2020 at 11:06 AM

## 2020-11-16 ENCOUNTER — HOSPITAL ENCOUNTER (OUTPATIENT)
Dept: WOUND CARE | Age: 85
Discharge: HOME | End: 2020-11-16
Payer: MEDICARE

## 2020-11-16 VITALS
RESPIRATION RATE: 18 BRPM | DIASTOLIC BLOOD PRESSURE: 78 MMHG | HEIGHT: 63 IN | BODY MASS INDEX: 21.53 KG/M2 | HEART RATE: 55 BPM | WEIGHT: 121.5 LBS | TEMPERATURE: 98.9 F | SYSTOLIC BLOOD PRESSURE: 179 MMHG

## 2020-11-16 PROCEDURE — 97597 DBRDMT OPN WND 1ST 20 CM/<: CPT

## 2020-11-16 PROCEDURE — 97597 DBRDMT OPN WND 1ST 20 CM/<: CPT | Performed by: SURGERY

## 2020-11-16 ASSESSMENT — PAIN SCALES - GENERAL: PAINLEVEL_OUTOF10: 0

## 2020-11-16 NOTE — PROGRESS NOTES
[address]   Progress Note and Procedure Note      Elkin Tillman  MEDICAL RECORD NUMBER:  [id number]  AGE: 80 y.o. GENDER: female  : 1930  EPISODE DATE:  2020    Subjective:     Chief Complaint   Patient presents with    Wound Check     Pt denies any new concerns or complaints, leg is feeling better         HISTORY of PRESENT ILLNESS HPI     Elkin Tillman is a 80 y.o. female who presents today for wound/ulcer evaluation. 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  Hiatal hernia     Hypertension     Other disorders of kidney and ureter in diseases classified elsewhere     Pneumonia     Stroke ([address]     Tobacco abuse     Type 2 diabetes mellitus without complication ([address]        PAST SURGICAL HISTORY    Past Surgical History:   Procedure Laterality Date    CARDIAC CATHETERIZATION  2003    EF greater 50%  Angiographically normal coronary arteries, Normal left ventricular systolic function, Mild left ventricular systolic function, Mild left ventricular diastolic dysfunction and mild systemic hypertesion.      COLONOSCOPY      EYE SURGERY      HYSTERECTOMY         FAMILY HISTORY    Family History   Problem Relation Age of Onset    Diabetes Mother  Heart Disease Maternal Grandmother        SOCIAL HISTORY    Social History     Tobacco Use    Smoking status: Current Every Day Smoker     Packs/day: 0.50     Years: 70.00     Pack years: 35.00     Types: Cigarettes    Smokeless tobacco: Never Used   Substance Use Topics    Alcohol use: No    Drug use: No       ALLERGIES    No Known Allergies    MEDICATIONS    Current Outpatient Medications on File Prior to Encounter   Medication Sig Dispense Refill    Naproxen Sodium (ALEVE PO) Take 1 tablet by mouth daily      atorvastatin (LIPITOR) 20 MG tablet Take 20 mg by mouth daily      aspirin 81 MG EC tablet Take 81 mg by mouth daily      losartan (COZAAR) 50 MG tablet Take 50 mg by mouth daily       No current facility-administered medications on file prior to encounter. REVIEW OF SYSTEMS    A comprehensive review of systems was negative.     Objective:      BP (!) 179/78   Pulse 55   Temp 98.9 °F (37.2 °C) (Temporal)   Resp 18   Ht 5' 3\" (1.6 m)   Wt 121 lb 8 oz (55.1 kg)   BMI 21.52 kg/m²     Wt Readings from Last 3 Encounters:   11/16/20 121 lb 8 oz (55.1 kg)   11/02/20 121 lb 8 oz (55.1 kg)   10/22/20 132 lb (59.9 kg)       PHYSICAL EXAM    General Appearance: alert and oriented to person, place and time, well developed and well- nourished, in no acute distress  Skin: warm and dry, no rash or erythema  Head: normocephalic and atraumatic  Eyes: pupils equal, round, and reactive to light, extraocular eye movements intact, conjunctivae normal  ENT: tympanic membrane, external ear and ear canal normal bilaterally, nose without deformity, nasal mucosa and turbinates normal without polyps, lips teeth and gums normal  Neck: supple and non-tender without mass, no thyromegaly or thyroid nodules, no cervical lymphadenopathy  Pulmonary/Chest: clear to auscultation bilaterally- no wheezes, rales or rhonchi, normal air movement, no respiratory distress  Cardiovascular: normal rate, regular rhythm, normal S1 and S2, no murmurs, rubs, clicks, or gallops, distal pulses intact, no carotid bruits  Abdomen: soft, non-tender, non-distended, normal bowel sounds, no masses or organomegaly  Extremities: no cyanosis, clubbing or edema  Musculoskeletal: normal range of motion, no joint swelling, deformity or tenderness  Neurologic: reflexes normal and symmetric, no cranial nerve deficit, gait, coordination and speech normal, skin sensation normal      Assessment:      Problem List Items Addressed This Visit     Type 2 diabetes mellitus with circulatory disorder (HCC) (Chronic)    Tobacco dependence (Chronic)    * (Principal) Leg ulcer, right, with fat layer exposed (HCC) (Chronic)    Relevant Orders    Supply: Wound Cleanser    Supply: Brook Wound    Supply: Wound Dressings    Supply: Cover and Secure    Supply: Edema Control    Non-compliance - Primary    Relevant Orders    Supply: Wound Cleanser    Supply: Brook Wound    Supply: Wound Dressings    Supply: Cover and Secure    Supply: Edema Control    Moderate protein-calorie malnutrition (HCC)    Relevant Orders    Supply: Wound Cleanser    Supply: Brook Wound    Supply: Wound Dressings    Supply: Cover and Secure    Supply: Edema Control           Procedure Note  Indications:  Based on my examination of this patient's wound(s)/ulcer(s) today, debridement is required to promote healing and evaluate the wound base. Performed by: Meseret Torres MD    Consent obtained:  Yes    Time out taken:  Yes    Pain Control: Anesthetic  Anesthetic: 2% Lidocaine Gel Topical       Debridement:Non-excisional Debridement    Using curette the wound(s)/ulcer(s) was/were sharply debrided down through and including the removal of epidermis and dermis.         Devitalized Tissue Debrided:  fibrin, biofilm, slough, necrotic/eschar and exudate      Pre Debridement Measurements:  Are located in the Wound/Ulcer Documentation Flow Sheet    Wound/Ulcer #: 1    Percent of Wound(s)/Ulcer(s) Debrided: 100%    Total Surface Area Debrided:  0.32 sq cm       Diabetic/Pressure/Non Pressure Ulcers only:  Ulcer: Diabetic ulcer, fat layer exposed           Post Debridement Measurements:    Wound/Ulcer Descriptions are Pre Debridement --EXCEPT MEASUREMENTS    Wound 09/25/20 Pretibial Right wound 1- right pretibial venous (Active)   Wound Image    11/16/20 1044   Wound Etiology Venous 11/16/20 1044   Dressing Status Old drainage noted 11/16/20 1044   Wound Cleansed Soap and water 11/16/20 1044   Dressing/Treatment Xeroform; Foam 11/02/20 1120   Wound Length (cm) 0.8 cm 11/16/20 1044   Wound Width (cm) 0.4 cm 11/16/20 1044   Wound Depth (cm) 0.1 cm 11/16/20 1044   Wound Surface Area (cm^2) 0.32 cm^2 11/16/20 1044   Change in Wound Size % (l*w) 82.22 11/16/20 1044   Wound Volume (cm^3) 0.03 cm^3 11/16/20 1044   Wound Healing % 83 11/16/20 1044   Post-Procedure Length (cm) 0.8 cm 11/16/20 1058   Post-Procedure Width (cm) 0.4 cm 11/16/20 1058   Post-Procedure Depth (cm) 0.1 cm 11/16/20 1058   Post-Procedure Surface Area (cm^2) 0.32 cm^2 11/16/20 1058   Post-Procedure Volume (cm^3) 0.03 cm^3 11/16/20 1058   Distance Tunneling (cm) 0 cm 11/16/20 1044   Tunneling Position ___ O'Clock 0 11/16/20 1044   Undermining Starts ___ O'Clock 0 11/16/20 1044   Undermining Ends___ O'Clock 0 11/16/20 1044   Undermining Maxium Distance (cm) 0 11/16/20 1044   Wound Assessment Granulation tissue;Slough;Pink/red 11/16/20 1044   Drainage Amount Small 11/16/20 1044   Drainage Description Serosanguinous 11/16/20 1044   Odor None 11/16/20 1044   Brook-wound Assessment Intact 11/16/20 1044   Margins Attached edges 11/16/20 1044   Wound Thickness Description not for Pressure Injury Full thickness 11/16/20 1044   Number of days: 52             Estimated Blood Loss:  Minimal    Hemostasis Achieved:  by pressure and by silver nitrate stick    Procedural Pain:  0  / 10     Post Procedural Pain:  0 / 10     Response to treatment:  Well tolerated by patient. Plan:     Problem List Items Addressed This Visit     Type 2 diabetes mellitus with circulatory disorder (HCC) (Chronic)    Tobacco dependence (Chronic)    * (Principal) Leg ulcer, right, with fat layer exposed ([address] (Chronic)    Relevant Orders    Supply: Wound Cleanser    Supply: Brook Wound    Supply: Wound Dressings    Supply: Cover and Secure    Supply: Edema Control    Non-compliance - Primary    Relevant Orders    Supply: Wound Cleanser    Supply: Brook Wound    Supply: Wound Dressings    Supply: Cover and Secure    Supply: Edema Control    Moderate protein-calorie malnutrition (HCC)    Relevant Orders    Supply: Wound Cleanser    Supply: Brook Wound    Supply: Wound Dressings    Supply: Cover and Secure    Supply: Edema Control          Treatment Note please see attached Discharge Instructions    In my professional opinion this patient would benefit from HBO Therapy: No    Written patient dismissal instructions given to patient and signed by patient or POA.          Discharge [address] and Hyperbaric Oxygen Therapy   Physician Orders and Discharge Instructions  [facility] [place]  [address]  Telephone: [phone]    NAME:  Dee Stinson:  12/14/1930  MEDICAL RECORD NUMBER:  [id number]  DATE:  11/16/2020    Discharge condition: Stable    Discharge to: Home    Left via:Private automobile    Accompanied by: Self    EC[facility]: [facility] to change Stanley Marcelino on Monday Wednesday and Friday    Dressing Orders: Right Lower Ext Ulcer  Xeroform to open areas  Copa to pad as needed, Coflex Unn[place]oot, Keep clean and Dry  Elevate feet to level or above heart 3-4 times daily and as needed to for 30 minutes to reduce swelling  Remove wraps and call office if- Wraps get wet, Cause increased pain, Slide down or you are unable to make  your next scheduled appointment  Multi Vitamin, High Protein diet as tolerated  No smoking  Treatment Orders:  Protein rich diet (unless restricted by your physician); Multivitamin daily; Elevate legs above the level of your heart when  sitting 3-4 times daily for at least one hour each time, avoid standing for long periods of time    [facility] follow up visit ____________1 week_________________  (Please note your next appointment above and if you are unable to keep, kindly give a 24 hour notice. Thank you.)    If you experience any of the following, please call the [facility] during business hours:    * Increase in Pain  * Temperature over 101  * Increase in drainage from your wound  * Drainage with a foul odor  * Bleeding  * Increase in swelling  * Need for compression bandage changes due to slippage, breakthrough drainage. If you need medical attention outside of the business hours of the [facility] please contact your PCP or go to the nearest emergency room.         Electronically signed by Eric Briscoe MD on 11/16/2020 at 11:04 AM

## 2020-11-16 NOTE — PROGRESS NOTES
[place] Application   Below Knee    NAME:  Allene Aschoff  YOB: 1930  MEDICAL RECORD NUMBER:  [id number]  DATE:  11/16/2020     [x] Applied moisturizing agent to dry skin as needed.  [x] Appied primary and secondary dressing as ordered     [x] Applied Unna roll from toes to knee overlapping each time.  [x] Applied ace wrap or coban from toes to below the knee.  [x] Instructed patient/caregiver to keep dressing dry and intact. DO NOT REMOVE DRESSING.  [x] Instructed pt/family/caregiver to report excessive draining, loose bandage, wet dressing, severe pain or tingling in toes.  [x] Applied [place] dressing below the knee to Right lower leg(s)    [x]  Unna Boot(s) were applied per  Guidelines.      Electronically signed by Luz Maria Londono RN on 11/16/2020 at 11:19 AM

## 2020-11-23 ENCOUNTER — HOSPITAL ENCOUNTER (OUTPATIENT)
Dept: WOUND CARE | Age: 85
Discharge: HOME | End: 2020-11-23
Payer: MEDICARE

## 2020-11-23 VITALS
DIASTOLIC BLOOD PRESSURE: 65 MMHG | TEMPERATURE: 98 F | HEIGHT: 63 IN | BODY MASS INDEX: 21.44 KG/M2 | RESPIRATION RATE: 18 BRPM | SYSTOLIC BLOOD PRESSURE: 141 MMHG | HEART RATE: 48 BPM | WEIGHT: 121 LBS

## 2020-11-23 PROCEDURE — 97597 DBRDMT OPN WND 1ST 20 CM/<: CPT

## 2020-11-23 PROCEDURE — 97597 DBRDMT OPN WND 1ST 20 CM/<: CPT | Performed by: SURGERY

## 2020-11-23 ASSESSMENT — PAIN SCALES - GENERAL: PAINLEVEL_OUTOF10: 0

## 2020-11-23 ASSESSMENT — PAIN DESCRIPTION - ONSET: ONSET: ON-GOING

## 2020-11-23 ASSESSMENT — PAIN DESCRIPTION - ORIENTATION: ORIENTATION: RIGHT

## 2020-11-23 ASSESSMENT — PAIN DESCRIPTION - PROGRESSION: CLINICAL_PROGRESSION: NOT CHANGED

## 2020-11-23 ASSESSMENT — PAIN DESCRIPTION - FREQUENCY: FREQUENCY: INTERMITTENT

## 2020-11-23 ASSESSMENT — PAIN DESCRIPTION - PAIN TYPE: TYPE: ACUTE PAIN

## 2020-11-23 ASSESSMENT — PAIN DESCRIPTION - DESCRIPTORS: DESCRIPTORS: SORE

## 2020-11-23 ASSESSMENT — PAIN DESCRIPTION - LOCATION: LOCATION: LEG

## 2020-11-23 NOTE — PROGRESS NOTES
HISTORY    Social History     Tobacco Use    Smoking status: Current Every Day Smoker     Packs/day: 0.50     Years: 70.00     Pack years: 35.00     Types: Cigarettes    Smokeless tobacco: Never Used   Substance Use Topics    Alcohol use: No    Drug use: No       ALLERGIES    No Known Allergies    MEDICATIONS    Current Outpatient Medications on File Prior to Encounter   Medication Sig Dispense Refill    Naproxen Sodium (ALEVE PO) Take 1 tablet by mouth daily      atorvastatin (LIPITOR) 20 MG tablet Take 20 mg by mouth daily      aspirin 81 MG EC tablet Take 81 mg by mouth daily      losartan (COZAAR) 50 MG tablet Take 50 mg by mouth daily       No current facility-administered medications on file prior to encounter. REVIEW OF SYSTEMS    A comprehensive review of systems was negative.     Objective:      BP (!) 141/65   Pulse (!) 48   Temp 98 °F (36.7 °C) (Temporal)   Resp 18   Ht 5' 3\" (1.6 m)   Wt 121 lb (54.9 kg)   BMI 21.43 kg/m²     Wt Readings from Last 3 Encounters:   11/23/20 121 lb (54.9 kg)   11/16/20 121 lb 8 oz (55.1 kg)   11/02/20 121 lb 8 oz (55.1 kg)       PHYSICAL EXAM    General Appearance: alert and oriented to person, place and time, well developed and well- nourished, in no acute distress  Skin: warm and dry, no rash or erythema  Head: normocephalic and atraumatic  Eyes: pupils equal, round, and reactive to light, extraocular eye movements intact, conjunctivae normal  ENT: tympanic membrane, external ear and ear canal normal bilaterally, nose without deformity, nasal mucosa and turbinates normal without polyps, lips teeth and gums normal  Neck: supple and non-tender without mass, no thyromegaly or thyroid nodules, no cervical lymphadenopathy  Pulmonary/Chest: clear to auscultation bilaterally- no wheezes, rales or rhonchi, normal air movement, no respiratory distress  Cardiovascular: normal rate, regular rhythm, normal S1 and S2, no murmurs, rubs, clicks, or gallops, distal pulses intact, no carotid bruits  Abdomen: soft, non-tender, non-distended, normal bowel sounds, no masses or organomegaly  Extremities: no cyanosis, clubbing or edema  Musculoskeletal: normal range of motion, no joint swelling, deformity or tenderness  Neurologic: reflexes normal and symmetric, no cranial nerve deficit, gait, coordination and speech normal, skin sensation normal      Assessment:      Problem List Items Addressed This Visit     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 Performed by: Shantal Ken MD    Consent obtained:  Yes    Time out taken:  Yes    Pain Control: Anesthetic  Anesthetic: 2% Lidocaine Gel Topical       Debridement:Non-excisional Debridement    Using curette the wound(s)/ulcer(s) was/were sharply debrided down through and including the removal of epidermis and dermis.         Devitalized Tissue Debrided:  fibrin, biofilm, slough, necrotic/eschar and exudate      Pre Debridement Measurements:  Are located in the Wound/Ulcer Documentation Flow Sheet    Wound/Ulcer #: 1    Percent of Wound(s)/Ulcer(s) Debrided: 100%    Total Surface Area Debrided:  0.12 sq cm       Diabetic/Pressure/Non Pressure Ulcers only:  Ulcer: Non-Pressure ulcer, fat layer exposed           Post Debridement Measurements:    Wound/Ulcer Descriptions are Pre Debridement --EXCEPT MEASUREMENTS    Wound 09/25/20 Pretibial Right wound 1- right pretibial venous (Active)   Wound Image   11/23/20 1031   Wound Etiology Venous 11/23/20 1031 Dressing Status Old drainage noted 11/23/20 1031   Wound Cleansed Cleansed with saline 11/23/20 1031   Dressing/Treatment Xeroform 11/16/20 1118   Wound Length (cm) 0.4 cm 11/23/20 1031   Wound Width (cm) 0.3 cm 11/23/20 1031   Wound Depth (cm) 0.1 cm 11/23/20 1031   Wound Surface Area (cm^2) 0.12 cm^2 11/23/20 1031   Change in Wound Size % (l*w) 93.33 11/23/20 1031   Wound Volume (cm^3) 0.01 cm^3 11/23/20 1031   Wound Healing % 94 11/23/20 1031   Post-Procedure Length (cm) 0.4 cm 11/23/20 1038   Post-Procedure Width (cm) 0.3 cm 11/23/20 1038   Post-Procedure Depth (cm) 0.1 cm 11/23/20 1038   Post-Procedure Surface Area (cm^2) 0.12 cm^2 11/23/20 1038   Post-Procedure Volume (cm^3) 0.01 cm^3 11/23/20 1038   Distance Tunneling (cm) 0 cm 11/23/20 1031   Tunneling Position ___ O'Clock 0 11/23/20 1031   Undermining Starts ___ O'Clock 0 11/23/20 1031   Undermining Ends___ O'Clock 0 11/23/20 1031   Undermining Maxium Distance (cm) 0 11/23/20 1031   Wound Assessment Granulation tissue;Slough;Pink/red 11/23/20 1031   Drainage Amount Small 11/23/20 1031   Drainage Description Serosanguinous 11/23/20 1031   Odor None 11/23/20 1031   Brook-wound Assessment Intact 11/23/20 1031   Margins Attached edges 11/23/20 1031   Wound Thickness Description not for Pressure Injury Full thickness 11/23/20 1031   Number of days: 59             Estimated Blood Loss:  Minimal    Hemostasis Achieved:  by pressure and by silver nitrate stick    Procedural Pain:  0  / 10     Post Procedural Pain:  0 / 10     Response to treatment:  Well tolerated by patient.          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 Discharge [address] and Hyperbaric Oxygen Therapy   Physician Orders and Discharge Instructions  [address] [facility]  [place], [address]  Telephone: [phone]    NAME:  Dianne Flaquito:  12/14/1930  MEDICAL RECORD NUMBER:  [id number]  DATE:  11/23/2020    Discharge condition: Stable    Discharge to: Home    Left via:Private automobile    Accompanied by: Family     ECF/HHA: [facility] to change Lea Wellerain on Monday Wednesday and Friday     Dressing Orders: Right Lower Ext Ulcer  Xeroform to open areas  Copa to pad as needed, Coflex Unnaboot, Keep clean and Dry  Elevate feet to level or above heart 3-4 times daily and as needed to for 30 minutes to reduce swelling  Remove wraps and call office if- Wraps get wet, Cause increased pain, Slide down or you are unable to make  your next scheduled appointment  Multi Vitamin, High Protein diet as tolerated  No smoking    Treatment Orders:  Protein rich diet (unless restricted by your physician); Multivitamin daily; Elevate legs above the level of your heart when  sitting 3-4 times daily for at least one hour each time, avoid standing for long periods of time    [facility] follow up visit ___________2 weeks__________________  (Please note your next appointment above and if you are unable to keep, kindly give a 24 hour notice.  Thank you.)    If you experience any of the following, please call the [address] [place] [address] Road during business hours:    * Increase in Pain  * Temperature over 101  * Increase in drainage from your wound  * Drainage with a foul odor  * Bleeding  * Increase in swelling  * Need for compression bandage changes due to slippage, breakthrough drainage. If you need medical attention outside of the business hours of the [address] [place] [address] please contact your PCP or go to the nearest emergency room.         Electronically signed by Bonnie Whitten MD on 11/23/2020 at 10:56 AM

## 2020-11-23 NOTE — PLAN OF CARE
[place] Application   Below Knee    NAME:  Kristin Arenas  YOB: 1930  MEDICAL RECORD NUMBER:  [id number]  DATE:  11/23/2020     [x] Applied moisturizing agent to dry skin as needed.  [x] Appied primary and secondary dressing as ordered     [x] Applied Unna roll from toes to knee overlapping each time.  [x] Applied ace wrap or coban from toes to below the knee. Laurent Galvin [x] Instructed patient/caregiver to keep dressing dry and intact. DO NOT REMOVE DRESSING.  [x] Instructed pt/family/caregiver to report excessive draining, loose bandage, wet dressing, severe pain or tingling in toes.  [x] Applied [place] dressing below the knee to Right lower leg(s)    [x]  Unna Boot(s) were applied per  Guidelines.      Electronically signed by Matt Hartman RN on 11/23/2020 at 11:14 AM

## 2020-12-09 ENCOUNTER — HOSPITAL ENCOUNTER (OUTPATIENT)
Dept: WOUND CARE | Age: 85
Discharge: HOME | End: 2020-12-09
Payer: MEDICARE

## 2020-12-09 VITALS
HEART RATE: 62 BPM | HEIGHT: 63 IN | DIASTOLIC BLOOD PRESSURE: 75 MMHG | TEMPERATURE: 98.6 F | BODY MASS INDEX: 21.44 KG/M2 | SYSTOLIC BLOOD PRESSURE: 164 MMHG | WEIGHT: 121 LBS | RESPIRATION RATE: 20 BRPM

## 2020-12-09 PROCEDURE — 99212 OFFICE O/P EST SF 10 MIN: CPT | Performed by: SURGERY

## 2020-12-09 PROCEDURE — 99212 OFFICE O/P EST SF 10 MIN: CPT

## 2020-12-09 ASSESSMENT — PAIN DESCRIPTION - LOCATION: LOCATION: LEG

## 2020-12-09 ASSESSMENT — PAIN DESCRIPTION - ORIENTATION: ORIENTATION: RIGHT

## 2020-12-09 ASSESSMENT — PAIN DESCRIPTION - DESCRIPTORS: DESCRIPTORS: ACHING

## 2020-12-09 ASSESSMENT — PAIN SCALES - GENERAL: PAINLEVEL_OUTOF10: 9

## 2020-12-09 ASSESSMENT — PAIN DESCRIPTION - PROGRESSION: CLINICAL_PROGRESSION: NOT CHANGED

## 2020-12-09 ASSESSMENT — PAIN DESCRIPTION - ONSET: ONSET: ON-GOING

## 2020-12-09 ASSESSMENT — PAIN DESCRIPTION - FREQUENCY: FREQUENCY: CONTINUOUS

## 2020-12-09 NOTE — PLAN OF CARE
Problem: Wound:  Goal: Will show signs of wound healing; wound closure and no evidence of infection  Description: Will show signs of wound healing; wound closure and no evidence of infection  12/9/2020 0916 by Jimbo Sagastume RN  Outcome: Met This Shift  12/9/2020 0915 by Jimbo Sagastume RN  Outcome: Ongoing     Problem: Venous:  Goal: Signs of wound healing will improve  Description: Signs of wound healing will improve  12/9/2020 0916 by Jimbo Sagastume RN  Outcome: Met This Shift  12/9/2020 0915 by Jimbo Sagastume RN  Outcome: Ongoing     Problem: Smoking cessation:  Goal: Ability to formulate a plan to maintain a tobacco-free life will be supported  Description: Ability to formulate a plan to maintain a tobacco-free life will be supported  12/9/2020 0916 by Jimbo Sagastume RN  Outcome: Met This Shift  12/9/2020 0915 by Jimbo Sagastume RN  Outcome: Ongoing     Problem: Compression therapy:  Goal: Will be free from complications associated with compression therapy  Description: Will be free from complications associated with compression therapy  12/9/2020 0916 by Jimbo Sagastume RN  Outcome: Met This Shift  12/9/2020 0915 by Jimbo Sagastume RN  Outcome: Ongoing

## 2020-12-09 NOTE — PROGRESS NOTES
[address]   Progress Note and Procedure Note      Lucy Yancey  MEDICAL RECORD NUMBER:  [id number]  AGE: 80 y.o. GENDER: female  : 1930  EPISODE DATE:  2020    Subjective:     Chief Complaint   Patient presents with    Wound Check     Pt denies any new concerns. Unnaboot only wrapped 3/4 of way up leg on arrival         HISTORY of PRESENT ILLNESS HPI     Lucy Ynacey is a 80 y.o. female who presents today for wound/ulcer evaluation. History of Wound Context: Pt with RLE wound here for eval/treat  Wound/Ulcer Pain Timing/Severity: none  Quality of pain: N/A  Severity:  0 / 10   Modifying Factors: None  Associated Signs/Symptoms: none    Ulcer Identification:  Ulcer Type: venous  Contributing Factors: edema, venous stasis, lymphedema and diabetes    Wound: DM        PAST MEDICAL HISTORY        Diagnosis Date    Arthritis     Blood circulation, collateral     Bradycardia     of uncertain etiology    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 (HCC)     Chest discomfort     of uncertain etiology    Chronic kidney disease     Diabetes mellitus (HCC)     GERD (gastroesophageal reflux disease)     H/O: CVA (cerebrovascular accident) 2015    Residual Right Sided Weakness.  Hiatal hernia     Hypertension     Other disorders of kidney and ureter in diseases classified elsewhere     Pneumonia     Stroke ([address]     Tobacco abuse     Type 2 diabetes mellitus without complication ([address]        PAST SURGICAL HISTORY    Past Surgical History:   Procedure Laterality Date    CARDIAC CATHETERIZATION  2003    EF greater 50%  Angiographically normal coronary arteries, Normal left ventricular systolic function, Mild left ventricular systolic function, Mild left ventricular diastolic dysfunction and mild systemic hypertesion.      COLONOSCOPY      EYE SURGERY      HYSTERECTOMY         FAMILY HISTORY    Family History   Problem Relation Age of Onset    Diabetes Mother     Heart Disease Maternal Grandmother        SOCIAL HISTORY    Social History     Tobacco Use    Smoking status: Current Every Day Smoker     Packs/day: 0.50     Years: 70.00     Pack years: 35.00     Types: Cigarettes    Smokeless tobacco: Never Used    Tobacco comment: states 10 or 9   Substance Use Topics    Alcohol use: No    Drug use: No       ALLERGIES    No Known Allergies    MEDICATIONS    Current Outpatient Medications on File Prior to Encounter   Medication Sig Dispense Refill    ibuprofen (ADVIL;MOTRIN) 100 MG/5ML suspension Take by mouth every 4 hours as needed for Fever      Naproxen Sodium (ALEVE PO) Take 1 tablet by mouth daily      atorvastatin (LIPITOR) 20 MG tablet Take 20 mg by mouth daily      aspirin 81 MG EC tablet Take 81 mg by mouth daily      losartan (COZAAR) 50 MG tablet Take 50 mg by mouth daily       No current facility-administered medications on file prior to encounter. REVIEW OF SYSTEMS    A comprehensive review of systems was negative.     Objective:      BP (!) 164/75   Pulse 62   Temp 98.6 °F (37 °C) (Temporal)   Resp 20   Ht 5' 3\" (1.6 m)   Wt 121 lb (54.9 kg)   BMI 21.43 kg/m²     Wt Readings from Last 3 Encounters:   12/09/20 121 lb (54.9 kg)   11/23/20 121 lb (54.9 kg)   11/16/20 121 lb 8 oz (55.1 kg)       PHYSICAL EXAM    General Appearance: alert and oriented to person, place and time, well developed and well- nourished, in no acute distress  Skin: warm and dry, no rash or erythema  Head: normocephalic and atraumatic  Eyes: pupils equal, round, and reactive to light, extraocular eye movements intact, conjunctivae normal  ENT: tympanic membrane, external ear and ear canal normal bilaterally, nose without deformity, nasal mucosa and turbinates normal without polyps, lips teeth and gums normal  Neck: supple and non-tender without mass, no thyromegaly or thyroid nodules, no cervical lymphadenopathy  Pulmonary/Chest: clear to auscultation bilaterally- no wheezes, rales or rhonchi, normal air movement, no respiratory distress  Cardiovascular: normal rate, regular rhythm, normal S1 and S2, no murmurs, rubs, clicks, or gallops, distal pulses intact, no carotid bruits  Abdomen: soft, non-tender, non-distended, normal bowel sounds, no masses or organomegaly  Extremities: no cyanosis, clubbing or edema  Musculoskeletal: normal range of motion, no joint swelling, deformity or tenderness  Neurologic: reflexes normal and symmetric, no cranial nerve deficit, gait, coordination and speech normal, skin sensation normal      Assessment:      Patient Active Problem List   Diagnosis Code    Chest discomfort R07.89    Bradycardia R00.1    Hiatal hernia K44.9    Weakness generalized R53.1    Abdominal pain affecting pregnancy, antepartum O26.899, R10.9    Stroke ([facility]) I63.9    Generalized weakness R53.1    Aortic insufficiency I35.1    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 ([facility]) G22.129    Essential hypertension I10    Type 2 diabetes mellitus with circulatory disorder ([facility]) E11.59    Tobacco dependence F17.200    Pneumonia J18.9    Cerebrovascular accident (CVA) due to thrombosis of cerebral artery ([facility]) I63.30    Type 2 diabetes mellitus without complication ([address] M55.2    Nonrheumatic aortic valve insufficiency I35.1    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 ([facility]) I63.312    Rhabdomyolysis M62.82    Right flank pain R10.9    Encephalopathy G93.40    History of stroke Z86.73    Leg ulcer, right, with fat layer exposed ([address] L97.912    Non-compliance Z91.19    Moderate protein-calorie malnutrition (HCC) E44.0             Wound 09/25/20 Pretibial Right wound 1- right pretibial venous (Active)   Wound Image   12/09/20 0851   Wound Etiology Venous 12/09/20 0851   Dressing Status Dry; Intact 12/09/20 0851   Wound Cleansed Soap and water 12/09/20 0851   Dressing/Treatment Xeroform; Foam 11/23/20 1113   Wound Length (cm) 0 cm 12/09/20 0851   Wound Width (cm) 0 cm 12/09/20 0851   Wound Depth (cm) 0 cm 12/09/20 0851   Wound Surface Area (cm^2) 0 cm^2 12/09/20 0851   Change in Wound Size % (l*w) 100 12/09/20 0851   Wound Volume (cm^3) 0 cm^3 12/09/20 0851   Wound Healing % 100 12/09/20 0851   Post-Procedure Length (cm) 0.4 cm 11/23/20 1038   Post-Procedure Width (cm) 0.3 cm 11/23/20 1038   Post-Procedure Depth (cm) 0.1 cm 11/23/20 1038   Post-Procedure Surface Area (cm^2) 0.12 cm^2 11/23/20 1038   Post-Procedure Volume (cm^3) 0.01 cm^3 11/23/20 1038   Distance Tunneling (cm) 0 cm 12/09/20 0851   Tunneling Position ___ O'Clock 0 12/09/20 0851   Undermining Starts ___ O'Clock 0 12/09/20 0851   Undermining Ends___ O'Clock 0 12/09/20 0851   Undermining Maxium Distance (cm) 0 12/09/20 0851   Wound Assessment Epithelialization 12/09/20 0851   Drainage Amount None 12/09/20 0851   Drainage Description Serosanguinous 11/23/20 1031   Odor None 12/09/20 0851   Brook-wound Assessment Intact 11/23/20 1031   Margins Attached edges 11/23/20 1031   Wound Thickness Description not for Pressure Injury Full thickness 11/23/20 1031   Number of days: 75             Plan:     Problem List Items Addressed This Visit     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 RTO PRN    Treatment Note please see attached Discharge Instructions    In my professional opinion this patient would benefit from HBO Therapy: No  [address]   Progress Note and Procedure Note      POA.          Discharge [address] and Hyperbaric Oxygen Therapy   Physician Orders and Discharge Instructions  [address] [facility]  [place], [address]  Telephone: [phone]    NAME:  Shena Flavin:  12/14/1930  MEDICAL RECORD NUMBER:  [id number]  DATE:  12/9/2020    Discharge condition: Stable    Discharge to: Home    Left via:Private automobile    Accompanied by: Family     [facility]HHA: [facility]      Dressing Orders: Right Lower Ext Ulcer  Healed, Moisturize and Protect  Wear compression stockings daily as tolerated,   Elevate feet to level or above heart 3-4 times daily and as needed to for 30 minutes to reduce swelling  Multi Vitamin, High Protein diet as tolerated  No smoking  Follow up with Dr Moiz Crum as instructed      [address] [place] [address],[address] follow up visit ____________PRN_________________  (Please note your next appointment above and if you are unable to keep, kindly give a 24 hour notice. Thank you.)    If you experience any of the following, please call the [address] [place] [address] [facility] during business hours:    * Increase in Pain  * Temperature over 101  * Increase in drainage from your wound  * Drainage with a foul odor  * Bleeding  * Increase in swelling  * Need for compression bandage changes due to slippage, breakthrough drainage. If you need medical attention outside of the business hours of the [facility] [address] [address] please contact your PCP or go to the nearest emergency room. Electronically signed by Vito Sales MD on 12/9/2020 at 9:30 AM            Written patient dismissal instructions given to patient and signed by patient or POA.              Electronically signed by Vito Sales MD on 12/9/2020 at 9:11 AM

## 2020-12-09 NOTE — PLAN OF CARE
Problem: Wound:  Goal: Will show signs of wound healing; wound closure and no evidence of infection  Description: Will show signs of wound healing; wound closure and no evidence of infection  Outcome: Ongoing     Problem: Venous:  Goal: Signs of wound healing will improve  Description: Signs of wound healing will improve  Outcome: Ongoing     Problem: Compression therapy:  Goal: Will be free from complications associated with compression therapy  Description: Will be free from complications associated with compression therapy  Outcome: Ongoing     Problem: Wound:  Goal: Will show signs of wound healing; wound closure and no evidence of infection  Description: Will show signs of wound healing; wound closure and no evidence of infection  Outcome: Ongoing

## 2021-08-12 ENCOUNTER — APPOINTMENT (OUTPATIENT)
Dept: CT IMAGING | Age: 86
DRG: 445 | End: 2021-08-12
Payer: MEDICARE

## 2021-08-12 ENCOUNTER — APPOINTMENT (OUTPATIENT)
Dept: GENERAL RADIOLOGY | Age: 86
DRG: 445 | End: 2021-08-12
Payer: MEDICARE

## 2021-08-12 ENCOUNTER — APPOINTMENT (OUTPATIENT)
Dept: ULTRASOUND IMAGING | Age: 86
DRG: 445 | End: 2021-08-12
Payer: MEDICARE

## 2021-08-12 ENCOUNTER — HOSPITAL ENCOUNTER (INPATIENT)
Age: 86
LOS: 7 days | Discharge: HOME HEALTH | DRG: 445 | End: 2021-08-19
Attending: HOSPITALIST | Admitting: HOSPITALIST
Payer: MEDICARE

## 2021-08-12 DIAGNOSIS — R77.8 ELEVATED TROPONIN: ICD-10-CM

## 2021-08-12 DIAGNOSIS — K81.0 ACUTE CHOLECYSTITIS: Primary | ICD-10-CM

## 2021-08-12 DIAGNOSIS — R55 SYNCOPE AND COLLAPSE: ICD-10-CM
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
## 2021-08-12 LAB
ALBUMIN SERPL-MCNC: 3.9 G/DL (ref 3.5–5.2)
ALP BLD-CCNC: 85 U/L (ref 35–104)
ALT SERPL-CCNC: 7 U/L (ref 5–33)
ANION GAP SERPL CALCULATED.3IONS-SCNC: 13 MMOL/L (ref 7–19)
AST SERPL-CCNC: 21 U/L (ref 5–32)
BASOPHILS ABSOLUTE: 0 K/UL (ref 0–0.2)
BASOPHILS RELATIVE PERCENT: 0.2 % (ref 0–1)
BILIRUB SERPL-MCNC: 2.3 MG/DL (ref 0.2–1.2)
BILIRUBIN URINE: NEGATIVE
BLOOD, URINE: NEGATIVE
BUN BLDV-MCNC: 8 MG/DL (ref 8–23)
CALCIUM SERPL-MCNC: 9.8 MG/DL (ref 8.8–10.2)
CHLORIDE BLD-SCNC: 106 MMOL/L (ref 98–111)
CLARITY: ABNORMAL
CO2: 21 MMOL/L (ref 22–29)
COLOR: YELLOW
CREAT SERPL-MCNC: 0.8 MG/DL (ref 0.5–0.9)
EOSINOPHILS ABSOLUTE: 0.2 K/UL (ref 0–0.6)
EOSINOPHILS RELATIVE PERCENT: 2.1 % (ref 0–5)
GFR AFRICAN AMERICAN: >59
GFR NON-AFRICAN AMERICAN: >60
GLUCOSE BLD-MCNC: 105 MG/DL (ref 74–109)
GLUCOSE BLD-MCNC: 94 MG/DL (ref 70–99)
GLUCOSE URINE: NEGATIVE MG/DL
HBA1C MFR BLD: 5.3 % (ref 4–6)
HCT VFR BLD CALC: 41 % (ref 37–47)
HEMOGLOBIN: 13.7 G/DL (ref 12–16)
IMMATURE GRANULOCYTES #: 0 K/UL
KETONES, URINE: NEGATIVE MG/DL
LEUKOCYTE ESTERASE, URINE: NEGATIVE
LYMPHOCYTES ABSOLUTE: 1.9 K/UL (ref 1.1–4.5)
LYMPHOCYTES RELATIVE PERCENT: 18.6 % (ref 20–40)
MCH RBC QN AUTO: 31.4 PG (ref 27–31)
MCHC RBC AUTO-ENTMCNC: 33.4 G/DL (ref 33–37)
MCV RBC AUTO: 94 FL (ref 81–99)
MONOCYTES ABSOLUTE: 1 K/UL (ref 0–0.9)
MONOCYTES RELATIVE PERCENT: 10.3 % (ref 0–10)
NEUTROPHILS ABSOLUTE: 6.9 K/UL (ref 1.5–7.5)
NEUTROPHILS RELATIVE PERCENT: 68.4 % (ref 50–65)
NITRITE, URINE: NEGATIVE
PDW BLD-RTO: 12.7 % (ref 11.5–14.5)
PERFORMED ON: NORMAL
PH UA: 7 (ref 5–8)
PLATELET # BLD: 134 K/UL (ref 130–400)
PMV BLD AUTO: 10.5 FL (ref 9.4–12.3)
POTASSIUM REFLEX MAGNESIUM: 5.5 MMOL/L (ref 3.5–5)
POTASSIUM SERPL-SCNC: 4 MMOL/L (ref 3.5–5)
PROTEIN UA: NEGATIVE MG/DL
RBC # BLD: 4.36 M/UL (ref 4.2–5.4)
SARS-COV-2, NAAT: NOT DETECTED
SODIUM BLD-SCNC: 140 MMOL/L (ref 136–145)
SPECIFIC GRAVITY UA: 1.02 (ref 1–1.03)
TOTAL PROTEIN: 7 G/DL (ref 6.6–8.7)
TROPONIN: 0.04 NG/ML (ref 0–0.03)
TROPONIN: 0.07 NG/ML (ref 0–0.03)
TROPONIN: 0.08 NG/ML (ref 0–0.03)
UROBILINOGEN, URINE: 1 E.U./DL
WBC # BLD: 10.1 K/UL (ref 4.8–10.8)

## 2021-08-12 PROCEDURE — 2580000003 HC RX 258: Performed by: NURSE PRACTITIONER

## 2021-08-12 PROCEDURE — 87635 SARS-COV-2 COVID-19 AMP PRB: CPT

## 2021-08-12 PROCEDURE — 6360000004 HC RX CONTRAST MEDICATION: Performed by: NURSE PRACTITIONER

## 2021-08-12 PROCEDURE — 2700000000 HC OXYGEN THERAPY PER DAY

## 2021-08-12 PROCEDURE — 71045 X-RAY EXAM CHEST 1 VIEW: CPT

## 2021-08-12 PROCEDURE — 72125 CT NECK SPINE W/O DYE: CPT

## 2021-08-12 PROCEDURE — 81003 URINALYSIS AUTO W/O SCOPE: CPT

## 2021-08-12 PROCEDURE — 93005 ELECTROCARDIOGRAM TRACING: CPT | Performed by: NURSE PRACTITIONER

## 2021-08-12 PROCEDURE — 70450 CT HEAD/BRAIN W/O DYE: CPT

## 2021-08-12 PROCEDURE — 6360000002 HC RX W HCPCS: Performed by: NURSE PRACTITIONER

## 2021-08-12 PROCEDURE — 85025 COMPLETE CBC W/AUTO DIFF WBC: CPT

## 2021-08-12 PROCEDURE — 99285 EMERGENCY DEPT VISIT HI MDM: CPT

## 2021-08-12 PROCEDURE — 80053 COMPREHEN METABOLIC PANEL: CPT

## 2021-08-12 PROCEDURE — 87040 BLOOD CULTURE FOR BACTERIA: CPT

## 2021-08-12 PROCEDURE — 36415 COLL VENOUS BLD VENIPUNCTURE: CPT

## 2021-08-12 PROCEDURE — 6370000000 HC RX 637 (ALT 250 FOR IP): Performed by: NURSE PRACTITIONER

## 2021-08-12 PROCEDURE — 74177 CT ABD & PELVIS W/CONTRAST: CPT

## 2021-08-12 PROCEDURE — 82947 ASSAY GLUCOSE BLOOD QUANT: CPT

## 2021-08-12 PROCEDURE — 1210000000 HC MED SURG R&B

## 2021-08-12 PROCEDURE — 83036 HEMOGLOBIN GLYCOSYLATED A1C: CPT

## 2021-08-12 PROCEDURE — 84484 ASSAY OF TROPONIN QUANT: CPT

## 2021-08-12 PROCEDURE — 84132 ASSAY OF SERUM POTASSIUM: CPT

## 2021-08-12 PROCEDURE — 76705 ECHO EXAM OF ABDOMEN: CPT

## 2021-08-12 RX ORDER — LOSARTAN POTASSIUM 25 MG/1
50 TABLET ORAL DAILY
Status: DISCONTINUED | OUTPATIENT
Start: 2021-08-12 | End: 2021-08-14

## 2021-08-12 RX ORDER — DEXTROSE MONOHYDRATE 25 G/50ML
12.5 INJECTION, SOLUTION INTRAVENOUS PRN
Status: DISCONTINUED | OUTPATIENT
Start: 2021-08-12 | End: 2021-08-19 | Stop reason: HOSPADM

## 2021-08-12 RX ORDER — NITROGLYCERIN 0.4 MG/1
0.4 TABLET SUBLINGUAL EVERY 5 MIN PRN
Status: DISCONTINUED | OUTPATIENT
Start: 2021-08-12 | End: 2021-08-19 | Stop reason: HOSPADM

## 2021-08-12 RX ORDER — NICOTINE POLACRILEX 4 MG
15 LOZENGE BUCCAL PRN
Status: DISCONTINUED | OUTPATIENT
Start: 2021-08-12 | End: 2021-08-19 | Stop reason: HOSPADM

## 2021-08-12 RX ORDER — ONDANSETRON 2 MG/ML
4 INJECTION INTRAMUSCULAR; INTRAVENOUS EVERY 6 HOURS PRN
Status: DISCONTINUED | OUTPATIENT
Start: 2021-08-12 | End: 2021-08-19 | Stop reason: HOSPADM

## 2021-08-12 RX ORDER — ATORVASTATIN CALCIUM 20 MG/1
20 TABLET, FILM COATED ORAL DAILY
Status: DISCONTINUED | OUTPATIENT
Start: 2021-08-12 | End: 2021-08-19 | Stop reason: HOSPADM

## 2021-08-12 RX ORDER — SODIUM CHLORIDE 0.9 % (FLUSH) 0.9 %
5-40 SYRINGE (ML) INJECTION PRN
Status: DISCONTINUED | OUTPATIENT
Start: 2021-08-12 | End: 2021-08-19 | Stop reason: HOSPADM

## 2021-08-12 RX ORDER — DEXTROSE MONOHYDRATE 50 MG/ML
100 INJECTION, SOLUTION INTRAVENOUS PRN
Status: DISCONTINUED | OUTPATIENT
Start: 2021-08-12 | End: 2021-08-19 | Stop reason: HOSPADM

## 2021-08-12 RX ORDER — SODIUM CHLORIDE 0.9 % (FLUSH) 0.9 %
5-40 SYRINGE (ML) INJECTION EVERY 12 HOURS SCHEDULED
Status: DISCONTINUED | OUTPATIENT
Start: 2021-08-12 | End: 2021-08-19 | Stop reason: HOSPADM

## 2021-08-12 RX ORDER — POTASSIUM CHLORIDE 20 MEQ/1
40 TABLET, EXTENDED RELEASE ORAL PRN
Status: DISCONTINUED | OUTPATIENT
Start: 2021-08-12 | End: 2021-08-19 | Stop reason: HOSPADM

## 2021-08-12 RX ORDER — ONDANSETRON 4 MG/1
4 TABLET, ORALLY DISINTEGRATING ORAL EVERY 8 HOURS PRN
Status: DISCONTINUED | OUTPATIENT
Start: 2021-08-12 | End: 2021-08-19 | Stop reason: HOSPADM

## 2021-08-12 RX ORDER — MAGNESIUM SULFATE IN WATER 40 MG/ML
2000 INJECTION, SOLUTION INTRAVENOUS PRN
Status: DISCONTINUED | OUTPATIENT
Start: 2021-08-12 | End: 2021-08-19 | Stop reason: HOSPADM

## 2021-08-12 RX ORDER — POLYETHYLENE GLYCOL 3350 17 G/17G
17 POWDER, FOR SOLUTION ORAL DAILY PRN
Status: DISCONTINUED | OUTPATIENT
Start: 2021-08-12 | End: 2021-08-19 | Stop reason: HOSPADM

## 2021-08-12 RX ORDER — SODIUM CHLORIDE 9 MG/ML
INJECTION, SOLUTION INTRAVENOUS CONTINUOUS
Status: DISCONTINUED | OUTPATIENT
Start: 2021-08-12 | End: 2021-08-13

## 2021-08-12 RX ORDER — POTASSIUM CHLORIDE 7.45 MG/ML
10 INJECTION INTRAVENOUS PRN
Status: DISCONTINUED | OUTPATIENT
Start: 2021-08-12 | End: 2021-08-19 | Stop reason: HOSPADM

## 2021-08-12 RX ORDER — ACETAMINOPHEN 650 MG/1
650 SUPPOSITORY RECTAL EVERY 6 HOURS PRN
Status: DISCONTINUED | OUTPATIENT
Start: 2021-08-12 | End: 2021-08-19 | Stop reason: HOSPADM

## 2021-08-12 RX ORDER — ASPIRIN 81 MG/1
81 TABLET ORAL DAILY
Status: DISCONTINUED | OUTPATIENT
Start: 2021-08-12 | End: 2021-08-19 | Stop reason: HOSPADM

## 2021-08-12 RX ORDER — SODIUM CHLORIDE 9 MG/ML
25 INJECTION, SOLUTION INTRAVENOUS PRN
Status: DISCONTINUED | OUTPATIENT
Start: 2021-08-12 | End: 2021-08-19 | Stop reason: HOSPADM

## 2021-08-12 RX ORDER — SODIUM CHLORIDE, SODIUM LACTATE, POTASSIUM CHLORIDE, CALCIUM CHLORIDE 600; 310; 30; 20 MG/100ML; MG/100ML; MG/100ML; MG/100ML
INJECTION, SOLUTION INTRAVENOUS CONTINUOUS
Status: DISCONTINUED | OUTPATIENT
Start: 2021-08-12 | End: 2021-08-15

## 2021-08-12 RX ORDER — ACETAMINOPHEN 325 MG/1
650 TABLET ORAL EVERY 6 HOURS PRN
Status: DISCONTINUED | OUTPATIENT
Start: 2021-08-12 | End: 2021-08-19 | Stop reason: HOSPADM

## 2021-08-12 RX ADMIN — SODIUM CHLORIDE: 9 INJECTION, SOLUTION INTRAVENOUS at 21:09

## 2021-08-12 RX ADMIN — ATORVASTATIN CALCIUM 20 MG: 20 TABLET, FILM COATED ORAL at 21:09

## 2021-08-12 RX ADMIN — SODIUM CHLORIDE, SODIUM LACTATE, POTASSIUM CHLORIDE, AND CALCIUM CHLORIDE: 600; 310; 30; 20 INJECTION, SOLUTION INTRAVENOUS at 15:04

## 2021-08-12 RX ADMIN — ENOXAPARIN SODIUM 40 MG: 40 INJECTION SUBCUTANEOUS at 21:09

## 2021-08-12 RX ADMIN — PIPERACILLIN AND TAZOBACTAM 3375 MG: 3; .375 INJECTION, POWDER, LYOPHILIZED, FOR SOLUTION INTRAVENOUS at 19:17

## 2021-08-12 RX ADMIN — SODIUM CHLORIDE, PRESERVATIVE FREE 10 ML: 5 INJECTION INTRAVENOUS at 21:10

## 2021-08-12 RX ADMIN — ASPIRIN 81 MG: 81 TABLET, COATED ORAL at 21:09

## 2021-08-12 RX ADMIN — IOPAMIDOL 90 ML: 755 INJECTION, SOLUTION INTRAVENOUS at 13:28

## 2021-08-12 ASSESSMENT — ENCOUNTER SYMPTOMS
BLOOD IN STOOL: 0
CHEST TIGHTNESS: 0
SORE THROAT: 0
ABDOMINAL PAIN: 1
ABDOMINAL DISTENTION: 0
CONSTIPATION: 0
NAUSEA: 1
BACK PAIN: 0
WHEEZING: 0
COUGH: 0
TROUBLE SWALLOWING: 0
SHORTNESS OF BREATH: 1
SINUS PAIN: 0
VOMITING: 1
DIARRHEA: 0
SHORTNESS OF BREATH: 0
VOMITING: 0

## 2021-08-12 ASSESSMENT — PAIN SCALES - GENERAL
PAINLEVEL_OUTOF10: 0
PAINLEVEL_OUTOF10: 0
PAINLEVEL_OUTOF10: 3

## 2021-08-12 NOTE — ED NOTES
Lab unable to find green top tube. Green top redrawn and sent to lab.      Dalila Zaragoza RN  08/12/21 1188

## 2021-08-12 NOTE — H&P
[id number] [facility]      Hospitalist - History & Physical      PCP: Kassandra Sánchez MD    Date of Admission: 8/12/2021    Date of Service: 8/12/2021    Chief Complaint:  Fall at home, Abdominal pain     History Of Present Illness: The patient is a 80 y.o. female with past medical history of CKD, DM, HTN, CVA, and GERD who presented to [facility] Ed complaining of fall at home. Ms. Kusum Myles reports having a fall at home yesterday. States she felt dizzy and lightheadedness and fell backwards and hit her head to the door. Reports immediate weakness and unable to get up, and had to call EMS for help. Reports SOB and vomiting during the fall as well. States she has been having RUQ abdominal pain since last 3-4 days. Also reports having intermittent dull chest pain since last 2 days without radiation. States she lives at [facility]. Uses walker at baseline. Workup in ED revealed K+ 5.5 on admission down to 4.0, troponin 0.08 on arrival trended down to 0.04. unremarkable chest Xray, CT head unremarkable, CT cervical spine negative for fracture. CT abdomen showed small amount of pericholecystic fluid with no definite gallstones, or gallbladder wall thickening, early cholecystitis cannot be excluded, Fatty infiltration of the liver, possibly a large duodenal diverticulum. Ultrasound of gallbladder suggesting cholecystitis, there is sludge in the gallbladder with a possible small adherent stone. Past Medical History:        Diagnosis Date    Arthritis     Blood circulation, collateral     Bradycardia     of uncertain etiology    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 (HCC)     Chest discomfort     of uncertain etiology    Chronic kidney disease     Diabetes mellitus ([facility]     GERD (gastroesophageal reflux disease)     H/O: CVA (cerebrovascular accident) 12/18/2015    Residual Right Sided Weakness.      Hiatal hernia     Hypertension     Other disorders of kidney and ureter in palpitations and leg swelling. Reports intermittent dull chest pain for past 2-3 days    Gastrointestinal: Positive for abdominal pain, nausea and vomiting. Negative for abdominal distention, blood in stool, constipation and diarrhea. Reports RUQ pain    Endocrine: Negative for cold intolerance and heat intolerance. Genitourinary: Negative for difficulty urinating, flank pain, frequency and urgency. Musculoskeletal: Negative for arthralgias and myalgias. Neurological: Positive for dizziness, syncope and light-headedness. Negative for weakness, numbness and headaches. Lightheadedness and dizziness prior to fall at home yesterday    Hematological: Does not bruise/bleed easily. Psychiatric/Behavioral: Negative for agitation, confusion and dysphoric mood. Physical Examination:  /76   Pulse 76   Temp 97.8 °F (36.6 °C)   Resp 20   SpO2 98%     Physical Exam  Constitutional:       General: She is not in acute distress. Appearance: Normal appearance. She is not toxic-appearing or diaphoretic. HENT:      Head: Normocephalic and atraumatic. Right Ear: External ear normal.      Left Ear: External ear normal.      Nose: Nose normal. No congestion or rhinorrhea. Mouth/Throat:      Mouth: Mucous membranes are moist.      Pharynx: Oropharynx is clear. Eyes:      General: No scleral icterus. Extraocular Movements: Extraocular movements intact. Conjunctiva/sclera: Conjunctivae normal.   Cardiovascular:      Rate and Rhythm: Normal rate and regular rhythm. Pulses: Normal pulses. Heart sounds: Murmur heard. No friction rub. No gallop. Pulmonary:      Effort: Pulmonary effort is normal. No respiratory distress. Breath sounds: No wheezing, rhonchi or rales. Comments: decreased breath sounds bilaterally   Abdominal:      General: Abdomen is flat. Bowel sounds are normal. There is no distension. Palpations: Abdomen is soft. Tenderness: There is abdominal tenderness. Comments: RUQ tenderness noted    Musculoskeletal:         General: No swelling. Normal range of motion. Cervical back: Normal range of motion and neck supple. Right lower leg: No edema. Left lower leg: No edema. Skin:     General: Skin is warm and dry. Coloration: Skin is not jaundiced. Findings: No erythema, lesion or rash. Neurological:      General: No focal deficit present. Mental Status: She is alert and oriented to person, place, and time. Mental status is at baseline. Cranial Nerves: No cranial nerve deficit. Sensory: No sensory deficit. Motor: No weakness. Psychiatric:         Mood and Affect: Mood normal.         Behavior: Behavior normal.         Thought Content: Thought content normal.         Judgment: Judgment normal.          Diagnostic Data:  CBC:  Recent Labs     08/12/21  1235   WBC 10.1   HGB 13.7   HCT 41.0        BMP:  Recent Labs     08/12/21  1235 08/12/21  1530     --    K 5.5* 4.0     --    CO2 21*  --    BUN 8  --    CREATININE 0.8  --    CALCIUM 9.8  --      Recent Labs     08/12/21  1235   AST 21   ALT 7   BILITOT 2.3*   ALKPHOS 85     Cardiac Enzymes:   Recent Labs     08/12/21  1235 08/12/21  1530   TROPONINI 0.08* 0.04*     ABGs:  Lab Results   Component Value Date    PHART 7.440 09/23/2018    PO2ART 57.0 09/23/2018    TOJ6DPG 33.0 09/23/2018     Urinalysis:  Lab Results   Component Value Date    NITRU Negative 08/12/2021    WBCUA 1 10/17/2015    BACTERIA NEGATIVE 10/17/2015    RBCUA 1 10/17/2015    BLOODU Negative 08/12/2021    SPECGRAV 1.024 08/12/2021    GLUCOSEU Negative 08/12/2021         RAD:    CT HEAD WO CONTRAST  Result Date: 8/12/2021    Moderate cerebral and cerebellar volume loss with chronic microvascular disease but no evidence of acute intracranial process. Signed by Dr Bradford Gonzales      CT Cervical Spine WO Contrast  Result Date: 8/12/2021    1. Degenerative changes in the cervical spine without evidence of acute osseous injury. Signed by Dr Castillo Feldman Additional Contrast? None  Result Date: 8/12/2021    1. Small amount of pericholecystic fluid with no definite gallstones, or gallbladder wall thickening. Early cholecystitis cannot be excluded. Follow-up gallbladder ultrasound is recommended. No biliary ductal dilatation. 2. Geographic fatty infiltration of the liver with sparing along the gallbladder fossa and within the lateral aspect of the left lobe of the liver. 3. Fluid and air filled structure adjacent to the distal duodenum possibly a large duodenal diverticulum. 4. Small amount of free fluid in the right pelvis. No evidence of free air. 5. Nonvisualization of the appendix, with no findings to indicate acute appendicitis. Signed by Dr Xuan Coleman. [place]      US GALLBLADDER RUQ  Result Date: 8/12/2021    Findings suggesting cholecystitis. There is sludge in the gallbladder with a possible small adherent stone. Signed by Dr Alanna Taylor  Result Date: 8/12/2021    1. No radiographic evidence of acute cardiopulmonary process. Signed by Dr Cinthia Loredo      Assessment/Plan:  Principal Problem:    Acute cholecystitis  Active Problems:    Essential hypertension    Type 2 diabetes mellitus with circulatory disorder ([facility]    History of stroke    Syncope    Fall at home  Resolved Problems:    * No resolved hospital problems.  *       Principal Problem:    Acute cholecystitis-    - NPO   - General surgery consultation and recommendations robert      Zosyn    - IVFs   - Monitor labs closely          Active Problems:    Fall at home 2/2 Syncope-    - ECHO    - EKG   - Fall precautions       Essential hypertension- noted, stable at this time, resume home medications       Type 2 diabetes mellitus with circulatory disorder ([facility]- HgbA1c, SSI, Accu-Cheks, hypoglycemia treatment protocol in place      History of stroke- noted, resume home medication         DVT Prophylaxis:  Lovenox       Further Orders per Clinical course/attending. Signed:  Electronically signed by CHERELLE Oscar CNP on 8/12/21 at 5:42 PM CDT       EMR Dragon/Transcription disclaimer:   Much of this encounter note is an electronic transcription/translation of spoken language to printed text.  The electronic translation of spoken language may permit erroneous, or at times, nonsensical words or phrases to be inadvertently transcribed; although attempts have made to review the note for such errors, some may still exist.

## 2021-08-12 NOTE — ED NOTES
Attempted to collect urine sample, but urine was emptied out of bedpan before collection. najma linares.      Navdeep Kulkarni RN  08/12/21 0636

## 2021-08-12 NOTE — ED NOTES
Called Dr Yulia Healy with General Surgery @ [phone] @ [phone]     Jared Pickett  08/12/21 8765

## 2021-08-12 NOTE — ED PROVIDER NOTES
[facility] EMERGENCY DEPT  EMERGENCY DEPARTMENT ENCOUNTER      Pt Name: Shaheed Martinez  MRN: [id number]  [place] 12/14/1930  Date of evaluation: 8/12/2021  Provider: CHERELLE Aceves CNP    CHIEF COMPLAINT       Chief Complaint   Patient presents with   Amber Ward     arrived via EMS from home with a fall, pt reports she fell in hallway hitting back of head         HISTORY OF PRESENT ILLNESS   (Location/Symptom, Timing/Onset, Context/Setting, Quality, Duration, Modifying Factors, Severity)  Note limiting factors. Shaheed Martinez is a 80 y.o. female who presents to the emergency department with concern for fall at her apartment this morning. States she has been having right sided abdominal pain x2 days. Today fell back hitting her head. She denies loc. No neck pain. Has had some nausea but no v/d. No flank pain or urinary symptoms. Cannot recall abdominal surgeries except for GYN surgery previously. HPI    Nursing Notes were reviewed. REVIEW OF SYSTEMS    (2-9 systems for level 4, 10 or more for level 5)     Review of Systems   Constitutional: Negative for chills and fever. HENT: Negative for congestion. Respiratory: Negative for cough, chest tightness and shortness of breath. Cardiovascular: Negative for chest pain and palpitations. Gastrointestinal: Positive for abdominal pain and nausea. Negative for diarrhea and vomiting. Genitourinary: Negative for dysuria, flank pain and urgency. Musculoskeletal: Negative for back pain and neck pain. Neurological: Positive for syncope. Negative for dizziness, weakness, light-headedness and headaches. Except as noted above the remainder of the review of systems was reviewed and negative.        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  Hiatal hernia     Hypertension     Other disorders of kidney and ureter in diseases classified elsewhere     Pneumonia     Stroke ([facility]     Tobacco abuse     Type 2 diabetes mellitus without complication ([facility] 51/23/7050         SURGICAL HISTORY       Past Surgical History:   Procedure Laterality Date    CARDIAC CATHETERIZATION  09/25/2003    EF greater 50%  Angiographically normal coronary arteries, Normal left ventricular systolic function, Mild left ventricular systolic function, Mild left ventricular diastolic dysfunction and mild systemic hypertesion.  COLONOSCOPY      EYE SURGERY      HYSTERECTOMY           CURRENT MEDICATIONS       Previous Medications    ASPIRIN 81 MG EC TABLET    Take 81 mg by mouth daily    ATORVASTATIN (LIPITOR) 20 MG TABLET    Take 20 mg by mouth daily    IBUPROFEN (ADVIL;MOTRIN) 100 MG/5ML SUSPENSION    Take by mouth every 4 hours as needed for Fever    LOSARTAN (COZAAR) 50 MG TABLET    Take 50 mg by mouth daily    NAPROXEN SODIUM (ALEVE PO)    Take 1 tablet by mouth daily       ALLERGIES     Patient has no known allergies. FAMILY HISTORY       Family History   Problem Relation Age of Onset    Diabetes Mother     Heart Disease Maternal Grandmother           SOCIAL HISTORY       Social History     Socioeconomic History    Marital status:       Spouse name: None    Number of children: None    Years of education: None    Highest education level: None   Occupational History    None   Tobacco Use    Smoking status: Current Every Day Smoker     Packs/day: 0.50     Years: 70.00     Pack years: 35.00     Types: Cigarettes    Smokeless tobacco: Never Used    Tobacco comment: states 10 or 9   Vaping Use    Vaping Use: Never used   Substance and Sexual normal.      Pupils: Pupils are equal, round, and reactive to light. Cardiovascular:      Rate and Rhythm: Normal rate and regular rhythm. Pulses: Normal pulses. Heart sounds: Normal heart sounds. Pulmonary:      Effort: Pulmonary effort is normal.      Breath sounds: Normal breath sounds. Abdominal:      Palpations: Abdomen is soft. Tenderness: There is abdominal tenderness. Comments: Abdomen tender to palpation RUQ and RLQ. No guarding or rebound. Musculoskeletal:      Right lower leg: No edema. Left lower leg: No edema. Skin:     General: Skin is dry. Capillary Refill: Capillary refill takes less than 2 seconds. Neurological:      General: No focal deficit present. Mental Status: She is alert and oriented to person, place, and time. DIAGNOSTIC RESULTS     EKG: All EKG's are interpreted by the Emergency Department Physician who either signs or Co-signs this chart in the absence of a cardiologist.    Sinus rhythm no stemi rate 59. Nonspecific changes in T waves. Reviewed by Dr Erin Yanes. RADIOLOGY:   Non-plain film images such as CT, Ultrasound and MRI are read by the radiologist. Plain radiographic images are visualized and preliminarily interpreted by the emergency physician with the below findings:        Interpretation per the Radiologist below, if available at the time of this note:    [address]   Final Result   Findings suggesting cholecystitis. There is sludge in the   gallbladder with a possible small adherent stone. Signed by Dr Melissa Serra Contrast   Final Result   1. Degenerative changes in the cervical spine without evidence of   acute osseous injury. Signed by Dr Darvin Rosen Additional Contrast? None   Final Result   1. Small amount of pericholecystic fluid with no definite gallstones,   or gallbladder wall thickening. Early cholecystitis cannot be   excluded. Follow-up gallbladder ultrasound is recommended. No biliary   ductal dilatation. 2. Geographic fatty infiltration of the liver with sparing along the   gallbladder fossa and within the lateral aspect of the left lobe of   the liver. 3. Fluid and air filled structure adjacent to the distal duodenum   possibly a large duodenal diverticulum. 4. Small amount of free fluid in the right pelvis. No evidence of free   air. 5. Nonvisualization of the appendix, with no findings to indicate   acute appendicitis. Signed by Dr Gonzales Ya. [place]      CT HEAD WO CONTRAST   Final Result   Moderate cerebral and cerebellar volume loss with chronic   microvascular disease but no evidence of acute intracranial process. Signed by Dr Sha Briceno   Final Result   1. No radiographic evidence of acute cardiopulmonary process.    Signed by Dr Emely Savage            ED BEDSIDE ULTRASOUND:   Performed by ED Physician - none    LABS:  Labs Reviewed   CBC WITH AUTO DIFFERENTIAL - Abnormal; Notable for the following components:       Result Value    MCH 31.4 (*)     Neutrophils % 68.4 (*)     Lymphocytes % 18.6 (*)     Monocytes % 10.3 (*)     Monocytes Absolute 1.00 (*)     All other components within normal limits   COMPREHENSIVE METABOLIC PANEL W/ REFLEX TO MG FOR LOW K - Abnormal; Notable for the following components:    Potassium reflex Magnesium 5.5 (*)     CO2 21 (*)     Total Bilirubin 2.3 (*)     All other components within normal limits   TROPONIN - Abnormal; Notable for the following components:    Troponin 0.08 (*)     All other components within normal limits   URINE RT REFLEX TO CULTURE - Abnormal; Notable for the following components:    Clarity, UA CLOUDY (*)     All other components within normal limits   TROPONIN - Abnormal; Notable for the following components:    Troponin 0.04 (*)     All other components within normal limits   COVID-19, RAPID   CULTURE, BLOOD 1   CULTURE, BLOOD 2 POTASSIUM   HEMOGLOBIN A1C   TROPONIN   TROPONIN   POCT GLUCOSE   POCT GLUCOSE       All other labs were within normal range or not returned as of this dictation. EMERGENCY DEPARTMENT COURSE and DIFFERENTIAL DIAGNOSIS/MDM:   Vitals:    Vitals:    08/12/21 1502 08/12/21 1602 08/12/21 1657 08/12/21 1901   BP: 135/76 122/76 110/76 134/74   Pulse: 75 74 76 74   Resp: 20 20 20 17   Temp:    98 °F (36.7 °C)   SpO2: 99% 99% 98% 97%           MDM      REASSESSMENT          CRITICAL CARE TIME       CONSULTS:  IP CONSULT TO GENERAL SURGERY    PROCEDURES:  Unless otherwise noted below, none     Procedures         FINAL IMPRESSION      1. Acute cholecystitis    2. Elevated troponin    3. Syncope and collapse          DISPOSITION/PLAN   DISPOSITION        PATIENT REFERRED TO:  No follow-up provider specified. DISCHARGE MEDICATIONS:  New Prescriptions    No medications on file     Controlled Substances Monitoring:     No flowsheet data found.     (Please note that portions of this note were completed with a voice recognition program.  Efforts were made to edit the dictations but occasionally words are mis-transcribed.)    Leopold Brace, APRN - CNP (electronically signed)  Attending Emergency Physician         Leopold Brace, APRN - CNP  08/12/21 1912

## 2021-08-13 LAB
ALBUMIN SERPL-MCNC: 3.2 G/DL (ref 3.5–5.2)
ALP BLD-CCNC: 69 U/L (ref 35–104)
ALT SERPL-CCNC: <5 U/L (ref 5–33)
ANION GAP SERPL CALCULATED.3IONS-SCNC: 12 MMOL/L (ref 7–19)
AST SERPL-CCNC: 16 U/L (ref 5–32)
BASOPHILS ABSOLUTE: 0 K/UL (ref 0–0.2)
BASOPHILS RELATIVE PERCENT: 0.4 % (ref 0–1)
BILIRUB SERPL-MCNC: 2.5 MG/DL (ref 0.2–1.2)
BILIRUBIN DIRECT: 0.4 MG/DL (ref 0–0.3)
BILIRUBIN, INDIRECT: 2.1 MG/DL (ref 0.1–1)
BUN BLDV-MCNC: 8 MG/DL (ref 8–23)
CALCIUM SERPL-MCNC: 8.9 MG/DL (ref 8.8–10.2)
CHLORIDE BLD-SCNC: 108 MMOL/L (ref 98–111)
CO2: 27 MMOL/L (ref 22–29)
CREAT SERPL-MCNC: 0.9 MG/DL (ref 0.5–0.9)
EOSINOPHILS ABSOLUTE: 0.4 K/UL (ref 0–0.6)
EOSINOPHILS RELATIVE PERCENT: 5 % (ref 0–5)
GFR AFRICAN AMERICAN: >59
GFR NON-AFRICAN AMERICAN: 59
GLUCOSE BLD-MCNC: 117 MG/DL (ref 70–99)
GLUCOSE BLD-MCNC: 63 MG/DL (ref 70–99)
GLUCOSE BLD-MCNC: 71 MG/DL (ref 70–99)
GLUCOSE BLD-MCNC: 77 MG/DL (ref 70–99)
GLUCOSE BLD-MCNC: 77 MG/DL (ref 74–109)
HCT VFR BLD CALC: 34.8 % (ref 37–47)
HEMOGLOBIN: 11.8 G/DL (ref 12–16)
IMMATURE GRANULOCYTES #: 0 K/UL
LV EF: 40 %
LVEF MODALITY: NORMAL
LYMPHOCYTES ABSOLUTE: 2.7 K/UL (ref 1.1–4.5)
LYMPHOCYTES RELATIVE PERCENT: 36.2 % (ref 20–40)
MCH RBC QN AUTO: 30.7 PG (ref 27–31)
MCHC RBC AUTO-ENTMCNC: 33.9 G/DL (ref 33–37)
MCV RBC AUTO: 90.6 FL (ref 81–99)
MONOCYTES ABSOLUTE: 0.6 K/UL (ref 0–0.9)
MONOCYTES RELATIVE PERCENT: 8 % (ref 0–10)
NEUTROPHILS ABSOLUTE: 3.8 K/UL (ref 1.5–7.5)
NEUTROPHILS RELATIVE PERCENT: 50.1 % (ref 50–65)
PDW BLD-RTO: 11.9 % (ref 11.5–14.5)
PERFORMED ON: ABNORMAL
PERFORMED ON: ABNORMAL
PERFORMED ON: NORMAL
PERFORMED ON: NORMAL
PLATELET # BLD: 116 K/UL (ref 130–400)
PMV BLD AUTO: 10.6 FL (ref 9.4–12.3)
POTASSIUM REFLEX MAGNESIUM: 3.6 MMOL/L (ref 3.5–5)
RBC # BLD: 3.84 M/UL (ref 4.2–5.4)
SODIUM BLD-SCNC: 147 MMOL/L (ref 136–145)
TOTAL PROTEIN: 5.9 G/DL (ref 6.6–8.7)
TROPONIN: 0.06 NG/ML (ref 0–0.03)
WBC # BLD: 7.5 K/UL (ref 4.8–10.8)

## 2021-08-13 PROCEDURE — 85025 COMPLETE CBC W/AUTO DIFF WBC: CPT

## 2021-08-13 PROCEDURE — 93005 ELECTROCARDIOGRAM TRACING: CPT | Performed by: NURSE PRACTITIONER

## 2021-08-13 PROCEDURE — 80053 COMPREHEN METABOLIC PANEL: CPT

## 2021-08-13 PROCEDURE — 2580000003 HC RX 258: Performed by: NURSE PRACTITIONER

## 2021-08-13 PROCEDURE — 84484 ASSAY OF TROPONIN QUANT: CPT

## 2021-08-13 PROCEDURE — 1210000000 HC MED SURG R&B

## 2021-08-13 PROCEDURE — 36415 COLL VENOUS BLD VENIPUNCTURE: CPT

## 2021-08-13 PROCEDURE — 99221 1ST HOSP IP/OBS SF/LOW 40: CPT | Performed by: SURGERY

## 2021-08-13 PROCEDURE — 93306 TTE W/DOPPLER COMPLETE: CPT

## 2021-08-13 PROCEDURE — 82248 BILIRUBIN DIRECT: CPT

## 2021-08-13 PROCEDURE — 82947 ASSAY GLUCOSE BLOOD QUANT: CPT

## 2021-08-13 PROCEDURE — 6360000002 HC RX W HCPCS: Performed by: NURSE PRACTITIONER

## 2021-08-13 RX ADMIN — SODIUM CHLORIDE, SODIUM LACTATE, POTASSIUM CHLORIDE, AND CALCIUM CHLORIDE: 600; 310; 30; 20 INJECTION, SOLUTION INTRAVENOUS at 22:02

## 2021-08-13 RX ADMIN — PIPERACILLIN AND TAZOBACTAM 3375 MG: 3; .375 INJECTION, POWDER, LYOPHILIZED, FOR SOLUTION INTRAVENOUS at 18:28

## 2021-08-13 RX ADMIN — ONDANSETRON HYDROCHLORIDE 4 MG: 2 SOLUTION INTRAMUSCULAR; INTRAVENOUS at 10:14

## 2021-08-13 RX ADMIN — PIPERACILLIN AND TAZOBACTAM 3375 MG: 3; .375 INJECTION, POWDER, LYOPHILIZED, FOR SOLUTION INTRAVENOUS at 01:51

## 2021-08-13 RX ADMIN — PIPERACILLIN AND TAZOBACTAM 3375 MG: 3; .375 INJECTION, POWDER, LYOPHILIZED, FOR SOLUTION INTRAVENOUS at 07:25

## 2021-08-13 RX ADMIN — ENOXAPARIN SODIUM 40 MG: 40 INJECTION SUBCUTANEOUS at 18:28

## 2021-08-13 RX ADMIN — SODIUM CHLORIDE: 9 INJECTION, SOLUTION INTRAVENOUS at 13:25

## 2021-08-13 RX ADMIN — SODIUM CHLORIDE, PRESERVATIVE FREE 10 ML: 5 INJECTION INTRAVENOUS at 09:27

## 2021-08-13 RX ADMIN — SODIUM CHLORIDE, PRESERVATIVE FREE 10 ML: 5 INJECTION INTRAVENOUS at 22:01

## 2021-08-13 RX ADMIN — PIPERACILLIN AND TAZOBACTAM 3375 MG: 3; .375 INJECTION, POWDER, LYOPHILIZED, FOR SOLUTION INTRAVENOUS at 13:35

## 2021-08-13 ASSESSMENT — ENCOUNTER SYMPTOMS
EYE REDNESS: 0
FACIAL SWELLING: 0
SHORTNESS OF BREATH: 1
ABDOMINAL PAIN: 1
EYE PAIN: 0
NAUSEA: 1
ABDOMINAL DISTENTION: 0
CHOKING: 0
VOMITING: 0
COUGH: 0
BLOOD IN STOOL: 0
WHEEZING: 0
EYE DISCHARGE: 0
SINUS PAIN: 0
DIARRHEA: 0
NAUSEA: 0
SHORTNESS OF BREATH: 0
TROUBLE SWALLOWING: 0
CONSTIPATION: 0
SORE THROAT: 0
BACK PAIN: 0
CHEST TIGHTNESS: 0

## 2021-08-13 NOTE — CONSULTS
[facility]    Patient ID: Gabo Peña  80 y.o.  female  YOB: 1930    Admitting Diagnosis: Acute cholecystitis [K81.0]  Syncope and collapse [R55]  Elevated troponin [R77.8]    Chief Complaint:  Chief Complaint   Patient presents with   [facility]     arrived via EMS from home with a fall, pt reports she fell in hallway hitting back of head       Subjective:    Ms. Nat Sutherland is a 80 y.o. female who presents today with intermittent abd pain of the last month to 2 months. This specific pain began two days ago. Pain is in her RUQ/epigastium and is on/off. Mild nausea noted, yet she is hungry. H/O CVA x2. Denies MI. Was brought the ED after a fall, which she hit her head. Trops elevated on her hospital stay. Worsening EF on ECHO. Denies fevers or chills. Daughter at bedside. She denies fever, chills, chest pain, SOB. 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  Hiatal hernia     Hypertension     Other disorders of kidney and ureter in diseases classified elsewhere     Pneumonia     Stroke ([address]     Tobacco abuse     Type 2 diabetes mellitus without complication ([address] 62/66/2410     Past Surgical History:   Procedure Laterality Date    CARDIAC CATHETERIZATION  09/25/2003    EF greater 50%  Angiographically normal coronary arteries, Normal left ventricular systolic function, Mild left ventricular systolic function, Mild left ventricular diastolic dysfunction and mild systemic hypertesion.      COLONOSCOPY      EYE SURGERY      HYSTERECTOMY       Current Facility-Administered Medications   Medication Dose Route Frequency Provider Last Rate Last Admin    lactated ringers infusion   Intravenous Continuous Lisbeth TOLEDO Michaud, APRN -  mL/hr at 08/12/21 1504 New Bag at 08/12/21 1504    piperacillin-tazobactam (ZOSYN) 3,375 mg in dextrose 5 % 50 mL IVPB (mini-bag)  3,375 mg Intravenous Q6H Tomi Areas, APRN - CNP   Stopped at 08/13/21 0755    aspirin EC tablet 81 mg  81 mg Oral Daily Tomi Areas, APRN - CNP   81 mg at 08/12/21 2109    atorvastatin (LIPITOR) tablet 20 mg  20 mg Oral Daily Tomi Areas, APRN - CNP   20 mg at 08/12/21 2109    losartan (COZAAR) tablet 50 mg  50 mg Oral Daily Tomi Areas, APRN - CNP        sodium chloride flush 0.9 % injection 5-40 mL  5-40 mL Intravenous 2 times per day Tomi Areas, APRN - CNP   10 mL at 08/13/21 0927    sodium chloride flush 0.9 % injection 5-40 mL  5-40 mL Intravenous PRN Tomi Areas, APRN - CNP        0.9 % sodium chloride infusion  25 mL Intravenous PRN Tomi Areas, APRN - CNP        enoxaparin (LOVENOX) injection 40 mg  40 mg Subcutaneous Daily Tomi Areas, APRN - CNP   40 mg at 08/12/21 2109    ondansetron (ZOFRAN-ODT) disintegrating tablet 4 mg  4 mg Oral Q8H PRN Tomi Areas, APRN - CNP        Or    ondansetron (ZOFRAN) injection 4 mg  4 mg Intravenous Q6H PRN Tomi Areas, APRN - CNP   4 mg at 08/13/21 1014    polyethylene glycol (GLYCOLAX) packet 17 g  17 g Oral Daily PRN Tomi Areas, APRN - CNP        acetaminophen (TYLENOL) tablet 650 mg  650 mg Oral Q6H PRN Tomi Areas, APRN - CNP        Or    acetaminophen (TYLENOL) suppository 650 mg  650 mg Rectal Q6H PRN Tomi Areas, APRN - CNP        0.9 % sodium chloride infusion   Intravenous Continuous Tomi Areas, APRN - CNP 75 mL/hr at 08/12/21 2109 New Bag at 08/12/21 2109    magnesium sulfate 2000 mg in 50 mL IVPB premix  2,000 mg Intravenous PRN Tomi Areas, APRN - CNP        potassium chloride (KLOR-CON M) extended release tablet 40 mEq  40 mEq Oral PRN Steven Mering, APRN - CNP        Or    potassium bicarb-citric acid (EFFER-K) effervescent tablet 40 mEq  40 mEq Oral PRN Steven Mering, APRN - CNP        Or    potassium chloride 10 mEq/100 mL IVPB (Peripheral Line)  10 mEq Intravenous PRN Steven Mering, APRN - CNP        glucose (GLUTOSE) 40 % oral gel 15 g  15 g Oral PRN Steven Mering, APRN - CNP        dextrose 50 % IV solution  12.5 g Intravenous PRN Steven Mering, APRN - CNP        glucagon (rDNA) injection 1 mg  1 mg Intramuscular PRN Steven Mering, APRN - CNP        dextrose 5 % solution  100 mL/hr Intravenous PRN Steven Mering, APRN - CNP        nitroGLYCERIN (NITROSTAT) SL tablet 0.4 mg  0.4 mg Sublingual Q5 Min PRN Steven Mering, APRN - CNP         Allergies: Patient has no known allergies. Family History   Problem Relation Age of Onset    Diabetes Mother     Heart Disease Maternal Grandmother        Social History     Tobacco Use    Smoking status: Current Every Day Smoker     Packs/day: 0.50     Years: 70.00     Pack years: 35.00     Types: Cigarettes    Smokeless tobacco: Never Used    Tobacco comment: states 10 or 9   Substance Use Topics    Alcohol use: No       Review of Systems   Constitutional: Positive for activity change and fatigue. Negative for chills and fever. HENT: Negative for facial swelling, hearing loss and sore throat. Eyes: Negative for pain, discharge and redness. Respiratory: Negative for choking, chest tightness, shortness of breath and wheezing. Cardiovascular: Negative for chest pain and palpitations. Gastrointestinal: Positive for abdominal pain and nausea. Negative for abdominal distention, constipation, diarrhea and vomiting. Endocrine: Positive for cold intolerance. Musculoskeletal: Negative for back pain, neck pain and neck stiffness. Neurological: Positive for dizziness and weakness.  Negative for speech difficulty and numbness. Psychiatric/Behavioral: Negative for agitation, hallucinations and suicidal ideas. Objective:   BP (!) 124/54   Pulse 58   Temp 98.1 °F (36.7 °C) (Temporal)   Resp 14   Ht 5' 3\" (1.6 m)   Wt 113 lb 9 oz (51.5 kg)   SpO2 96%   BMI 20.12 kg/m²       Intake/Output Summary (Last 24 hours) at 8/13/2021 1150  Last data filed at 8/13/2021 1030  Gross per 24 hour   Intake 783.62 ml   Output 300 ml   Net 483.62 ml     Physical Exam  Constitutional:       Appearance: She is underweight. HENT:      Head: Normocephalic and atraumatic. Right Ear: External ear normal.      Left Ear: External ear normal.      Nose: Nose normal.   Eyes:      Pupils: Pupils are equal, round, and reactive to light. Pulmonary:      Effort: Pulmonary effort is normal.      Breath sounds: Normal breath sounds. Abdominal:      General: Bowel sounds are normal.      Palpations: Abdomen is soft. Tenderness: There is abdominal tenderness in the right upper quadrant and epigastric area. There is no guarding or rebound. Positive signs include Malone's sign. Musculoskeletal:         General: Normal range of motion. Cervical back: Normal range of motion and neck supple. Skin:     General: Skin is warm and dry. Neurological:      Mental Status: She is alert and oriented to person, place, and time. Mental status is at baseline. Motor: Weakness (Right sided weakness) present.    Psychiatric:         Mood and Affect: Mood normal.         Behavior: Behavior normal.         Data:  CBC:   Recent Labs     08/12/21  1235 08/13/21  0259   WBC 10.1 7.5   RBC 4.36 3.84*   HGB 13.7 11.8*   HCT 41.0 34.8*   MCV 94.0 90.6   RDW 12.7 11.9    116*     BMP:   Recent Labs     08/12/21  1235 08/12/21  1530 08/13/21  0259     --  147*   K 5.5* 4.0 3.6     --  108   CO2 21*  --  27   ANIONGAP 13  --  12   GLUCOSE 105  --  77   CREATININE 0.8  --  0.9   LABGLOM >60  --  59*   CALCIUM 9.8  -- 8. 9     LFT:   Recent Labs     08/12/21  1235 08/13/21  0259   PROT 7.0 5.9*   LABALBU 3.9 3.2*   BILITOT 2.3* 2.5*   ALKPHOS 85 69   ALT 7 <5*   AST 21 16     PT/INR:No results for input(s): PROTIME, INR in the last 72 hours. Assessment:     Principal Problem:    Acute cholecystitis  Active Problems:    Essential hypertension    Type 2 diabetes mellitus with circulatory disorder (HCC)    History of stroke    Syncope    Fall at home  Resolved Problems:    * No resolved hospital problems.  *      Plan:     Continue conservative mgmt  WBC and neut normal   Cont Abx  Pt high risk for surgical intervention due to age and CV status   D/W pt and her daughter  Await CV workup  Will plan on non op mgmt and plan to slowly start a diet  Case d/w hospitalist    Thank you for your consult    Electronically signed by Navdeep Ragsdale DO on 8/13/2021 at 11:50 AM

## 2021-08-13 NOTE — PROGRESS NOTES
4 Eyes Skin Assessment    Brit Mcgarry is being assessed upon: Admission    I agree that I, Micaela Nicolas LPN, along with Jamison Lowe RN CN (either 2 RN's or 1 LPN and 1 RN) have performed a thorough Head to Toe Skin Assessment on the patient. ALL assessment sites listed below have been assessed. Areas assessed by both nurses:     [x]   Head, Face, and Ears   [x]   Shoulders, Back, and Chest  [x]   Arms, Elbows, and Hands   [x]   Coccyx, Sacrum, and Ischium  [x]   Legs, Feet, and Heels    Does the Patient have Skin Breakdown?  No    Yandel Prevention initiated: No  Wound Care Orders initiated: No    [facility] nurse consulted for Pressure Injury (Stage 3,4, Unstageable, DTI, NWPT, and Complex wounds) and New or Established Ostomies: No        Primary Nurse eSignature: Micaela Nicolas LPN on 9/09/5812 at 23:14 PM      Co-Signer eSignature: Electronically signed by Jamison Lowe RN on 8/12/21 at 10:44 PM CDT

## 2021-08-13 NOTE — PLAN OF CARE
Problem: Skin Integrity:  Goal: Will show no infection signs and symptoms  Description: Will show no infection signs and symptoms  Outcome: Ongoing  Goal: Absence of new skin breakdown  Description: Absence of new skin breakdown  Outcome: Ongoing     Problem: Activity:  Goal: Risk for activity intolerance will decrease  Description: Risk for activity intolerance will decrease  Outcome: Ongoing     Problem:  Bowel/Gastric:  Goal: Bowel function will improve  Description: Bowel function will improve  Outcome: Ongoing  Goal: Diagnostic test results will improve  Description: Diagnostic test results will improve  Outcome: Ongoing  Goal: Occurrences of nausea will decrease  Description: Occurrences of nausea will decrease  Outcome: Ongoing  Goal: Occurrences of vomiting will decrease  Description: Occurrences of vomiting will decrease  Outcome: Ongoing     Problem: Fluid Volume:  Goal: Maintenance of adequate hydration will improve  Description: Maintenance of adequate hydration will improve  Outcome: Ongoing     Problem: Health Behavior:  Goal: Ability to state signs and symptoms to report to health care provider will improve  Description: Ability to state signs and symptoms to report to health care provider will improve  Outcome: Ongoing     Problem: Physical Regulation:  Goal: Complications related to the disease process, condition or treatment will be avoided or minimized  Description: Complications related to the disease process, condition or treatment will be avoided or minimized  Outcome: Ongoing  Goal: Ability to maintain clinical measurements within normal limits will improve  Description: Ability to maintain clinical measurements within normal limits will improve  Outcome: Ongoing     Problem: Sensory:  Goal: Ability to identify factors that increase the pain will improve  Description: Ability to identify factors that increase the pain will improve  Outcome: Ongoing  Goal: Ability to notify healthcare provider of pain before it becomes unmanageable or unbearable will improve  Description: Ability to notify healthcare provider of pain before it becomes unmanageable or unbearable will improve  Outcome: Ongoing  Goal: Pain level will decrease  Description: Pain level will decrease  Outcome: Ongoing     Problem: Falls - Risk of:  Goal: Will remain free from falls  Description: Will remain free from falls  Outcome: Ongoing  Goal: Absence of physical injury  Description: Absence of physical injury  Outcome: Ongoing

## 2021-08-13 NOTE — PLAN OF CARE
Problem: Skin Integrity:  Goal: Will show no infection signs and symptoms  Description: Will show no infection signs and symptoms  8/12/2021 2349 by [address], LPN  Outcome: Ongoing  8/12/2021 2228 by [address], LPN  Outcome: Ongoing  8/12/2021 2225 by [address], LPN  Outcome: Ongoing  Goal: Absence of new skin breakdown  Description: Absence of new skin breakdown  8/12/2021 2349 by [address], LPN  Outcome: Ongoing  8/12/2021 2228 by [address], LPN  Outcome: Ongoing  8/12/2021 2225 by [address], LPN  Outcome: Ongoing     Problem: Activity:  Goal: Risk for activity intolerance will decrease  Description: Risk for activity intolerance will decrease  8/12/2021 2349 by [address], LPN  Outcome: Ongoing  8/12/2021 2228 by [address], LPN  Outcome: Ongoing  8/12/2021 2225 by [address], LPN  Outcome: Ongoing     Problem:  Bowel/Gastric:  Goal: Bowel function will improve  Description: Bowel function will improve  8/12/2021 2349 by [address], LPN  Outcome: Ongoing  8/12/2021 2228 by [address], LPN  Outcome: Ongoing  8/12/2021 2225 by [address], LPN  Outcome: Ongoing  Goal: Diagnostic test results will improve  Description: Diagnostic test results will improve  8/12/2021 2349 by [address], LPN  Outcome: Ongoing  8/12/2021 2228 by [address], LPN  Outcome: Ongoing  8/12/2021 2225 by [address], LPN  Outcome: Ongoing  Goal: Occurrences of nausea will decrease  Description: Occurrences of nausea will decrease  8/12/2021 2349 by [address], LPN  Outcome: Ongoing  8/12/2021 2228 by [address], LPN  Outcome: Ongoing  8/12/2021 2225 by [address], LPN  Outcome: Ongoing  Goal: Occurrences of vomiting will decrease  Description: Occurrences of vomiting will decrease  8/12/2021 2349 by [address], LPN  Outcome: Ongoing  8/12/2021 2228 by [address], LPN  Outcome: Ongoing  8/12/2021 2225 by [address], LPN  Outcome: Ongoing     Problem: Fluid Volume:  Goal: Maintenance of adequate hydration will improve  Description: Maintenance of adequate hydration will improve  8/12/2021 2349 by [address], LPN  Outcome: Ongoing  8/12/2021 2228 by [address], LPN  Outcome: Ongoing  8/12/2021 2225 by [address], LPN  Outcome: Ongoing     Problem: Health Behavior:  Goal: Ability to state signs and symptoms to report to health care provider will improve  Description: Ability to state signs and symptoms to report to health care provider will improve  8/12/2021 2349 by [address], LPN  Outcome: Ongoing  8/12/2021 2228 by [address], LPN  Outcome: Ongoing  8/12/2021 2225 by [address], LPN  Outcome: Ongoing     Problem: Physical Regulation:  Goal: Complications related to the disease process, condition or treatment will be avoided or minimized  Description: Complications related to the disease process, condition or treatment will be avoided or minimized  8/12/2021 2349 by [address], LPN  Outcome: Ongoing  8/12/2021 2228 by [address], LPN  Outcome: Ongoing  8/12/2021 2225 by [address], LPN  Outcome: Ongoing  Goal: Ability to maintain clinical measurements within normal limits will improve  Description: Ability to maintain clinical measurements within normal limits will improve  8/12/2021 2349 by [address], LPN  Outcome: Ongoing  8/12/2021 2228 by [address], LPN  Outcome: Ongoing  8/12/2021 2225 by [address], LPN  Outcome: Ongoing     Problem: Sensory:  Goal: Ability to identify factors that increase the pain will improve  Description: Ability to identify factors that increase the pain will improve  8/12/2021 2349 by [address], LPN  Outcome: Ongoing  8/12/2021 2228 by [address], LPN  Outcome: Ongoing  8/12/2021 2225 by [address], LPN  Outcome: Ongoing  Goal: Ability to notify healthcare provider of pain before it becomes unmanageable or unbearable will improve  Description: Ability to notify healthcare provider of pain before it becomes unmanageable or unbearable will improve  8/12/2021 2349 by [address], LPN  Outcome: Ongoing  8/12/2021 2228 by [address], LPN  Outcome: Ongoing  8/12/2021 2225 by [address], LPN  Outcome: Ongoing  Goal: Pain level will decrease  Description: Pain level will decrease  8/12/2021 2349 by [address], LPN  Outcome: Ongoing  8/12/2021 2228 by [address], LPN  Outcome: Ongoing  8/12/2021 2225 by [address], LPN  Outcome: Ongoing     Problem: Falls - Risk of:  Goal: Will remain free from falls  Description: Will remain free from falls  8/12/2021 2349 by [address], LPN  Outcome: Ongoing  8/12/2021 2228 by [address], LPN  Outcome: Ongoing  8/12/2021 2225 by [address], LPN  Outcome: Ongoing  Goal: Absence of physical injury  Description: Absence of physical injury  8/12/2021 2349 by [address], LPN  Outcome: Ongoing  8/12/2021 2228 by [address], LPN  Outcome: Ongoing  8/12/2021 2225 by [address], LPN  Outcome: Ongoing

## 2021-08-13 NOTE — PROGRESS NOTES
[facility]      Progress Note    Patient:  Alaina Aguilera  YOB: 1930  Date of Service: 8/13/2021  MRN: [id number]   Acct: [de-identified]   Primary Care Physician: Jordyn Rocha MD  Advance Directive: Full Code  Admit Date: 8/12/2021       Hospital Day: 1    Portions of this note have been copied forward, however, updated to reflect the most current clinical status of this patient. CHIEF COMPLAINT Fall at home, Abdominal pain    SUBJECTIVE:  Ms. Moisés Raygoza was resting comfortably in bed this morning. States she feels good, however has headache due to being [place]. Reports abdominal pain. States she has SOB at times. Denies SOB or chest pain at this time. CUMULATIVE HOSPITAL COURSE:   The patient is a 80 y.o. female with past medical history of CKD, DM, HTN, CVA, and GERD who presented to [address] [place] [address] ED complaining of fall at home. Ms. Moisés Raygoza reported having a fall at home. Stated she felt dizzy and lightheadedness and fell backwards and hit her head to the door. Reported immediate weakness and unable to get up, and had to call EMS for help. Reported SOB and vomiting during the fall as well. Stated she had been having RUQ abdominal pain since last 3-4 days. Also reported having intermittent dull chest pain since last 2 days without radiation. Stated she lives at [facility]. Uses walker at baseline. Workup in ED revealed K+ 5.5 on admission down to 4.0, troponin 0.08 on arrival trended down to 0.04. unremarkable chest Xray, CT head unremarkable, CT cervical spine negative for fracture. CT abdomen showed small amount of pericholecystic fluid with no definite gallstones, or gallbladder wall thickening, early cholecystitis cannot be excluded, Fatty infiltration of the liver, possibly a large duodenal diverticulum. Ultrasound of gallbladder suggesting cholecystitis, there is sludge in the gallbladder with a possible small adherent stone.  Patient was admitted to hospital medicine for acute cholecystitis and syncope with general surgery consultation. General surgery recommends no surgical intervention at this time given her advanced age and CV status. Recommends continuing current conservative management with antibiotics. ECHO showed reduced LV function, EF of 40%, mil-moderate global hypokinesis motion, grade 1 diastolic dysfunction, moderate aortic regurgitation, and mild tricuspid regurgitation. Review of Systems   Constitutional: Negative for chills, diaphoresis, fatigue and fever. HENT: Negative for congestion, ear pain, sinus pain, sore throat and trouble swallowing. Eyes: Negative for visual disturbance. Respiratory: Positive for shortness of breath. Negative for cough and wheezing. Reports SOB at times, denies SOB currently    Cardiovascular: Negative for chest pain, palpitations and leg swelling. Gastrointestinal: Positive for abdominal pain. Negative for abdominal distention, blood in stool, constipation, diarrhea, nausea and vomiting. Endocrine: Negative for cold intolerance and heat intolerance. Genitourinary: Negative for difficulty urinating, flank pain, frequency and urgency. Musculoskeletal: Negative for arthralgias and myalgias. Neurological: Negative for dizziness, syncope, weakness, light-headedness, numbness and headaches. Hematological: Does not bruise/bleed easily. Psychiatric/Behavioral: Negative for agitation, confusion and dysphoric mood. Objective:   VITALS:  /81   Pulse 64   Temp 98 °F (36.7 °C) (Temporal)   Resp 16   Ht 5' 3\" (1.6 m)   Wt 113 lb 9 oz (51.5 kg)   SpO2 98%   BMI 20.12 kg/m²   24HR INTAKE/OUTPUT:    Intake/Output Summary (Last 24 hours) at 8/13/2021 1418  Last data filed at 8/13/2021 1327  Gross per 24 hour   Intake 1052.62 ml   Output 300 ml   Net 752.62 ml         Physical Exam  Constitutional:       General: She is not in acute distress. Appearance: Normal appearance. She is not toxic-appearing or diaphoretic. HENT:      Head: Normocephalic and atraumatic. Right Ear: External ear normal.      Left Ear: External ear normal.      Nose: Nose normal. No congestion or rhinorrhea. Mouth/Throat:      Mouth: Mucous membranes are moist.      Pharynx: Oropharynx is clear. Eyes:      General: No scleral icterus. Extraocular Movements: Extraocular movements intact. Conjunctiva/sclera: Conjunctivae normal.   Cardiovascular:      Rate and Rhythm: Normal rate and regular rhythm. Pulses: Normal pulses. Heart sounds: Murmur heard. No friction rub. No gallop. Pulmonary:      Effort: Pulmonary effort is normal. No respiratory distress. Breath sounds: No wheezing, rhonchi or rales. Comments: Decreased breath sound bilaterally  Abdominal:      General: Abdomen is flat. Bowel sounds are normal. There is no distension. Palpations: Abdomen is soft. Tenderness: There is abdominal tenderness. Comments: RUQ and LLQ tenderness upon palpation    Musculoskeletal:         General: No swelling. Normal range of motion. Cervical back: Normal range of motion and neck supple. Right lower leg: No edema. Left lower leg: No edema. Skin:     General: Skin is warm and dry. Coloration: Skin is not jaundiced. Findings: No erythema, lesion or rash. Neurological:      General: No focal deficit present. Mental Status: She is alert and oriented to person, place, and time. Mental status is at baseline. Cranial Nerves: No cranial nerve deficit. Sensory: No sensory deficit. Motor: No weakness. Psychiatric:         Mood and Affect: Mood normal.         Behavior: Behavior normal.         Thought Content:  Thought content normal.         Judgment: Judgment normal.            Medications:      lactated ringers 100 mL/hr at 08/12/21 1504    sodium chloride      sodium chloride 75 mL/hr at 08/13/21 1325    dextrose        piperacillin-tazobactam  3,375 mg Intravenous Q6H    aspirin  81 mg Oral Daily    atorvastatin  20 mg Oral Daily    losartan  50 mg Oral Daily    sodium chloride flush  5-40 mL Intravenous 2 times per day    enoxaparin  40 mg Subcutaneous Daily     sodium chloride flush, sodium chloride, ondansetron **OR** ondansetron, polyethylene glycol, acetaminophen **OR** acetaminophen, magnesium sulfate, potassium chloride **OR** potassium alternative oral replacement **OR** potassium chloride, glucose, dextrose, glucagon (rDNA), dextrose, nitroGLYCERIN  ADULT DIET; Full Liquid     Lab and other Data:     Recent Labs     08/12/21  1235 08/13/21  0259   WBC 10.1 7.5   HGB 13.7 11.8*    116*     Recent Labs     08/12/21  1235 08/12/21  1530 08/13/21  0259     --  147*   K 5.5* 4.0 3.6     --  108   CO2 21*  --  27   BUN 8  --  8   CREATININE 0.8  --  0.9   GLUCOSE 105  --  77     Recent Labs     08/12/21  1235 08/13/21  0259   AST 21 16   ALT 7 <5*   BILITOT 2.3* 2.5*   ALKPHOS 85 69     Troponin T:   Recent Labs     08/12/21  1530 08/12/21  1918 08/12/21  2326   TROPONINI 0.04* 0.07* 0.06*     UA:  Recent Labs     08/12/21  1657   COLORU YELLOW   PHUR 7.0   CLARITYU CLOUDY*   SPECGRAV 1.024   LEUKOCYTESUR Negative   UROBILINOGEN 1.0   BILIRUBINUR Negative   BLOODU Negative   GLUCOSEU Negative     A1C:   Recent Labs     08/12/21  1235   LABA1C 5.3         RAD:   ECHO Complete 2D W Doppler W Color  Result Date: 8/13/2021    Summary  Normal left ventricular size with visually reduced LV function and an  estimated ejection fraction of approximately 40%. Mild-moderate global  hypokinesis motion. Normal LAP. Grade I diastolic dysfunction. Normal size left atrium. No evidence for PFO/ASD by color flow Doppler. Normal right ventricular size with grossly preserved RV function. Normal right atrial dimension. Mild eccentric, posteriorly directed mitral regurgitation. Moderate aortic regurgitation. No aortic stenosis is noted.   Mild tricuspid regurgitation. RVSP within normal limits. Aortic root is within normal limits. The ascending aorta and arch are  poorly visualized. No evidence of significant pericardial effusion is noted. The rhythm is sinus. ABNORMAL study. Compared to study report dated 9/24/2018, the LVEF is now  estimated at 40% and the degree of aortic regurgitation is now moderate. Signature   ----------------------------------------------------------------  Electronically signed by Jacob Lundberg(Interpreting physician)  on 08/13/2021 10:18 AM          CT HEAD WO CONTRAST  Result Date: 8/12/2021    Moderate cerebral and cerebellar volume loss with chronic microvascular disease but no evidence of acute intracranial process. Signed by Dr Brook Collado      CT Cervical Spine WO Contrast  Result Date: 8/12/2021    1. Degenerative changes in the cervical spine without evidence of acute osseous injury. Signed by Dr Chris Hurst Additional Contrast? None  Result Date: 8/12/2021    1. Small amount of pericholecystic fluid with no definite gallstones, or gallbladder wall thickening. Early cholecystitis cannot be excluded. Follow-up gallbladder ultrasound is recommended. No biliary ductal dilatation. 2. Geographic fatty infiltration of the liver with sparing along the gallbladder fossa and within the lateral aspect of the left lobe of the liver. 3. Fluid and air filled structure adjacent to the distal duodenum possibly a large duodenal diverticulum. 4. Small amount of free fluid in the right pelvis. No evidence of free air. 5. Nonvisualization of the appendix, with no findings to indicate acute appendicitis. Signed by Dr Bryant iMms. [place]      US GALLBLADDER RUQ  Result Date: 8/12/2021    Findings suggesting cholecystitis. There is sludge in the gallbladder with a possible small adherent stone. Signed by Dr Arielle Potter  Result Date: 8/12/2021    1.  No radiographic evidence of acute cardiopulmonary process. Signed by Dr Terrazas Grad:    Tra Esparza- negative   Blood cultures- pending       Assessment/Plan   Principal Problem:    Acute cholecystitis  Active Problems:    Essential hypertension    Type 2 diabetes mellitus with circulatory disorder ([facility])    History of stroke    Syncope    Fall at home  Resolved Problems:    * No resolved hospital problems. *       Principal Problem:    Acute cholecystitis-               - General surgery following     - recommends no surgical intervention at this time given her advanced age and CV status    - also recommends continuing current conservative management with antibiotics. - Zosyn               - IVFs              - Monitor labs closely             Active Problems:    Fall at home 2/2 Syncope-               - ECHO showed reduced LV function, EF of 40%, mil-moderate global hypokinesis motion, grade 1 diastolic dysfunction, moderate aortic regurgitation, and mild tricuspid regurgitation.               - EKG              - Fall precautions        Essential hypertension- noted, stable at this time, continue current medications        Type 2 diabetes mellitus with circulatory disorder ([facility] [address]- HgbA1c 5.3, d/c SSI. Accu-Cheks, hypoglycemia treatment protocol in place       History of stroke- noted, continue current medication           Antibiotic: Zosyn      DVT Prophylaxis: Lovenox         Further Orders per Clinical course/attending. Electronically signed by CHERELLE Lindsay CNP on 8/13/2021 at 2:18 PM       EMR Dragon/Transcription disclaimer:   Much of this encounter note is an electronic transcription/translation of spoken language to printed text.  The electronic translation of spoken language may permit erroneous, or at times, nonsensical words or phrases to be inadvertently transcribed; although attempts have made to review the note for such errors, some may still exist.

## 2021-08-14 LAB
ALBUMIN SERPL-MCNC: 3.5 G/DL (ref 3.5–5.2)
ALP BLD-CCNC: 66 U/L (ref 35–104)
ALT SERPL-CCNC: <5 U/L (ref 5–33)
ANION GAP SERPL CALCULATED.3IONS-SCNC: 11 MMOL/L (ref 7–19)
AST SERPL-CCNC: 15 U/L (ref 5–32)
BASOPHILS ABSOLUTE: 0 K/UL (ref 0–0.2)
BASOPHILS RELATIVE PERCENT: 0.4 % (ref 0–1)
BILIRUB SERPL-MCNC: 1.8 MG/DL (ref 0.2–1.2)
BUN BLDV-MCNC: 9 MG/DL (ref 8–23)
CALCIUM SERPL-MCNC: 9 MG/DL (ref 8.8–10.2)
CHLORIDE BLD-SCNC: 109 MMOL/L (ref 98–111)
CO2: 25 MMOL/L (ref 22–29)
CREAT SERPL-MCNC: 1.1 MG/DL (ref 0.5–0.9)
EKG P AXIS: 27 DEGREES
EKG P AXIS: NORMAL DEGREES
EKG P-R INTERVAL: 154 MS
EKG P-R INTERVAL: NORMAL MS
EKG Q-T INTERVAL: 486 MS
EKG Q-T INTERVAL: 498 MS
EKG QRS DURATION: 92 MS
EKG QRS DURATION: 94 MS
EKG QTC CALCULATION (BAZETT): 491 MS
EKG QTC CALCULATION (BAZETT): 495 MS
EKG T AXIS: -26 DEGREES
EKG T AXIS: -45 DEGREES
EOSINOPHILS ABSOLUTE: 0.6 K/UL (ref 0–0.6)
EOSINOPHILS RELATIVE PERCENT: 8.7 % (ref 0–5)
GFR AFRICAN AMERICAN: 56
GFR NON-AFRICAN AMERICAN: 47
GLUCOSE BLD-MCNC: 105 MG/DL (ref 70–99)
GLUCOSE BLD-MCNC: 81 MG/DL (ref 74–109)
GLUCOSE BLD-MCNC: 88 MG/DL (ref 70–99)
HCT VFR BLD CALC: 35.8 % (ref 37–47)
HEMOGLOBIN: 11.9 G/DL (ref 12–16)
IMMATURE GRANULOCYTES #: 0 K/UL
LYMPHOCYTES ABSOLUTE: 2 K/UL (ref 1.1–4.5)
LYMPHOCYTES RELATIVE PERCENT: 28.9 % (ref 20–40)
MCH RBC QN AUTO: 30.5 PG (ref 27–31)
MCHC RBC AUTO-ENTMCNC: 33.2 G/DL (ref 33–37)
MCV RBC AUTO: 91.8 FL (ref 81–99)
MONOCYTES ABSOLUTE: 0.7 K/UL (ref 0–0.9)
MONOCYTES RELATIVE PERCENT: 9.6 % (ref 0–10)
NEUTROPHILS ABSOLUTE: 3.6 K/UL (ref 1.5–7.5)
NEUTROPHILS RELATIVE PERCENT: 52.3 % (ref 50–65)
PDW BLD-RTO: 12.3 % (ref 11.5–14.5)
PERFORMED ON: ABNORMAL
PERFORMED ON: NORMAL
PLATELET # BLD: 115 K/UL (ref 130–400)
PMV BLD AUTO: 10.8 FL (ref 9.4–12.3)
POTASSIUM REFLEX MAGNESIUM: 3.6 MMOL/L (ref 3.5–5)
RBC # BLD: 3.9 M/UL (ref 4.2–5.4)
SODIUM BLD-SCNC: 145 MMOL/L (ref 136–145)
TOTAL PROTEIN: 6.1 G/DL (ref 6.6–8.7)
WBC # BLD: 6.9 K/UL (ref 4.8–10.8)

## 2021-08-14 PROCEDURE — 85025 COMPLETE CBC W/AUTO DIFF WBC: CPT

## 2021-08-14 PROCEDURE — 93010 ELECTROCARDIOGRAM REPORT: CPT | Performed by: INTERNAL MEDICINE

## 2021-08-14 PROCEDURE — 36415 COLL VENOUS BLD VENIPUNCTURE: CPT

## 2021-08-14 PROCEDURE — 1210000000 HC MED SURG R&B

## 2021-08-14 PROCEDURE — 6370000000 HC RX 637 (ALT 250 FOR IP): Performed by: HOSPITALIST

## 2021-08-14 PROCEDURE — 6370000000 HC RX 637 (ALT 250 FOR IP): Performed by: NURSE PRACTITIONER

## 2021-08-14 PROCEDURE — 80053 COMPREHEN METABOLIC PANEL: CPT

## 2021-08-14 PROCEDURE — 6360000002 HC RX W HCPCS: Performed by: HOSPITALIST

## 2021-08-14 PROCEDURE — 82947 ASSAY GLUCOSE BLOOD QUANT: CPT

## 2021-08-14 PROCEDURE — 2500000003 HC RX 250 WO HCPCS: Performed by: HOSPITALIST

## 2021-08-14 PROCEDURE — 99232 SBSQ HOSP IP/OBS MODERATE 35: CPT | Performed by: SURGERY

## 2021-08-14 PROCEDURE — 2580000003 HC RX 258: Performed by: NURSE PRACTITIONER

## 2021-08-14 PROCEDURE — 2580000003 HC RX 258: Performed by: HOSPITALIST

## 2021-08-14 RX ORDER — NIFEDIPINE 60 MG/1
60 TABLET, EXTENDED RELEASE ORAL DAILY
Status: DISCONTINUED | OUTPATIENT
Start: 2021-08-14 | End: 2021-08-19 | Stop reason: HOSPADM

## 2021-08-14 RX ADMIN — ATORVASTATIN CALCIUM 20 MG: 20 TABLET, FILM COATED ORAL at 09:32

## 2021-08-14 RX ADMIN — NIFEDIPINE 60 MG: 60 TABLET, EXTENDED RELEASE ORAL at 09:32

## 2021-08-14 RX ADMIN — SODIUM CHLORIDE 2000 MG: 9 INJECTION INTRAMUSCULAR; INTRAVENOUS; SUBCUTANEOUS at 17:51

## 2021-08-14 RX ADMIN — ASPIRIN 81 MG: 81 TABLET, COATED ORAL at 09:32

## 2021-08-14 RX ADMIN — METRONIDAZOLE 500 MG: 500 INJECTION, SOLUTION INTRAVENOUS at 17:51

## 2021-08-14 RX ADMIN — SODIUM CHLORIDE, PRESERVATIVE FREE 10 ML: 5 INJECTION INTRAVENOUS at 18:20

## 2021-08-14 ASSESSMENT — ENCOUNTER SYMPTOMS
VOMITING: 0
ABDOMINAL PAIN: 1
NAUSEA: 1
BACK PAIN: 0
COLOR CHANGE: 0

## 2021-08-14 NOTE — PROGRESS NOTES
[address] [facility]      Patient:  Jared Chicas  YOB: 1930  Date of Service: 8/14/2021  MRN: [id number]   Acct: [de-identified]   Primary Care Physician: Nayely Kahn MD  Advance Directive: Full Code  Admit Date: 8/12/2021       Hospital Day: 2  Portions of this note have been copied forward, however, changed to reflect the most current clinical status of this patient. CHIEF COMPLAINT fall at home, abd pain    SUBJECTIVE:  Patient states she was able to have some breakfast this morning. Continues to have some nausea and RUQ pain. CUMULATIVE HOSPITAL COURSE:   The patient is a 80 y. o. female with past medical history of CKD, DM, HTN, CVA, and GERD who presented to [address] [place] [address] ED via EMS complaining of fall at home. Pt stated she felt dizzy and lightheaded and fell backwards and hit her head on the door. Reported immediate weakness and unable to get up, and had to call EMS for help. Reported SOB and vomiting with the fall as well. Pt also c/o RUQ abdominal pain since last 3-4 days. Also reported having intermittent dull chest pain since last 2 days without radiation. Stated she lives at [facility]. Uses walker at baseline. Workup in ED revealed K+ 5.5 on admission down to 4.0, troponin 0.08 on arrival trended down to 0.04. unremarkable chest Xray, CT head unremarkable, CT cervical spine negative for fracture. CT abdomen showed small amount of pericholecystic fluid with no definite gallstones, or gallbladder wall thickening, early cholecystitis cannot be excluded, Fatty infiltration of the liver, possibly a large duodenal diverticulum. Ultrasound of gallbladder suggesting cholecystitis, there is sludge in the gallbladder with a possible small adherent stone. Patient was admitted to hospital medicine for acute cholecystitis and syncope with general surgery consultation. General surgery recommends no surgical intervention at this time given her advanced age and CV status.  Recommends continuing current conservative management with antibiotics. 8/14 general surgery recommended HIDA scan to ensure the gallbladder is emptying. Diet increased and patient tolerating well. ECHO showed reduced LV function, EF of 40%, mil-moderate global hypokinesis motion, grade 1 diastolic dysfunction, moderate aortic regurgitation, and mild tricuspid regurgitation. Review of Systems:   Review of Systems   Constitutional: Negative for fatigue and fever. Cardiovascular: Negative for chest pain. Gastrointestinal: Positive for abdominal pain and nausea. Negative for vomiting. Genitourinary: Negative for difficulty urinating and dysuria. Musculoskeletal: Negative for back pain. Skin: Negative for color change and rash. Neurological: Negative for dizziness, weakness and light-headedness. Psychiatric/Behavioral: Negative for confusion. 14 point review of systems is negative except as specifically addressed above. Objective:   VITALS:  BP (!) 148/59   Pulse 52   Temp 97.7 °F (36.5 °C)   Resp 18   Ht 5' 3\" (1.6 m)   Wt 113 lb 9 oz (51.5 kg)   SpO2 96%   BMI 20.12 kg/m²   24HR INTAKE/OUTPUT:    Intake/Output Summary (Last 24 hours) at 8/14/2021 1358  Last data filed at 8/14/2021 0945  Gross per 24 hour   Intake 850 ml   Output    Net 850 ml       Physical Exam  Constitutional:       Appearance: Normal appearance. HENT:      Head: Normocephalic. Mouth/Throat:      Mouth: Mucous membranes are moist.      Pharynx: Oropharynx is clear. Eyes:      Extraocular Movements: Extraocular movements intact. Pupils: Pupils are equal, round, and reactive to light. Cardiovascular:      Rate and Rhythm: Normal rate and regular rhythm. Pulses: Normal pulses. Heart sounds: Normal heart sounds. Pulmonary:      Effort: Pulmonary effort is normal.      Breath sounds: Normal breath sounds. Abdominal:      General: Abdomen is flat. Bowel sounds are normal.      Palpations: Abdomen is soft. Tenderness: There is abdominal tenderness. There is no guarding or rebound. Musculoskeletal:         General: No swelling. Normal range of motion. Cervical back: Normal range of motion and neck supple. Skin:     General: Skin is warm and dry. Capillary Refill: Capillary refill takes less than 2 seconds. Neurological:      General: No focal deficit present. Mental Status: She is alert and oriented to person, place, and time. Medications:      lactated ringers 100 mL/hr at 08/13/21 2202    sodium chloride      dextrose        cefepime  2,000 mg Intravenous Q24H    metroNIDAZOLE  500 mg Intravenous Q8H    NIFEdipine  60 mg Oral Daily    aspirin  81 mg Oral Daily    atorvastatin  20 mg Oral Daily    sodium chloride flush  5-40 mL Intravenous 2 times per day    enoxaparin  40 mg Subcutaneous Daily     sodium chloride flush, sodium chloride, ondansetron **OR** ondansetron, polyethylene glycol, acetaminophen **OR** acetaminophen, magnesium sulfate, potassium chloride **OR** potassium alternative oral replacement **OR** potassium chloride, glucose, dextrose, glucagon (rDNA), dextrose, nitroGLYCERIN  ADULT DIET; Full Liquid     Lab and other Data:     Recent Labs     08/12/21  1235 08/13/21  0259 08/14/21 0219   WBC 10.1 7.5 6.9   HGB 13.7 11.8* 11.9*    116* 115*     Recent Labs     08/12/21  1235 08/12/21  1530 08/13/21  0259 08/14/21  0219     --  147* 145   K 5.5* 4.0 3.6 3.6     --  108 109   CO2 21*  --  27 25   BUN 8  --  8 9   CREATININE 0.8  --  0.9 1.1*   GLUCOSE 105  --  77 81     Recent Labs     08/12/21  1235 08/13/21  0259 08/14/21  0219   AST 21 16 15   ALT 7 <5* <5*   BILITOT 2.3* 2.5* 1.8*   ALKPHOS 85 69 66     Troponin T:   Recent Labs     08/12/21  1530 08/12/21  1918 08/12/21  2326   TROPONINI 0.04* 0.07* 0.06*     Pro-BNP: No results for input(s): BNP in the last 72 hours. INR: No results for input(s): INR in the last 72 hours.   UA:  Recent Labs     08/12/21  1657   COLORU YELLOW   PHUR 7.0   CLARITYU CLOUDY*   SPECGRAV 1.024   LEUKOCYTESUR Negative   UROBILINOGEN 1.0   BILIRUBINUR Negative   BLOODU Negative   GLUCOSEU Negative     A1C:   Recent Labs     08/12/21  1235   LABA1C 5.3     ABG:No results for input(s): PHART, HUU0MVR, PO2ART, EXR5YAG, BEART, HGBAE, I8WJIDRT, CARBOXHGBART in the last 72 hours. RAD:   CT HEAD WO CONTRAST    Result Date: 8/12/2021  Moderate cerebral and cerebellar volume loss with chronic microvascular disease but no evidence of acute intracranial process. Signed by Dr Vanessa Santos    CT Cervical Spine WO Contrast    Result Date: 8/12/2021  1. Degenerative changes in the cervical spine without evidence of acute osseous injury. Signed by Dr Virginie Delgado Additional Contrast? None    Result Date: 8/12/2021  1. Small amount of pericholecystic fluid with no definite gallstones, or gallbladder wall thickening. Early cholecystitis cannot be excluded. Follow-up gallbladder ultrasound is recommended. No biliary ductal dilatation. 2. Geographic fatty infiltration of the liver with sparing along the gallbladder fossa and within the lateral aspect of the left lobe of the liver. 3. Fluid and air filled structure adjacent to the distal duodenum possibly a large duodenal diverticulum. 4. Small amount of free fluid in the right pelvis. No evidence of free air. 5. Nonvisualization of the appendix, with no findings to indicate acute appendicitis. Signed by Dr Carter Waldenhoellie    US GALLBLADDER RUQ    Result Date: 8/12/2021  Findings suggesting cholecystitis. There is sludge in the gallbladder with a possible small adherent stone. Signed by Dr Yahir Broussard    Result Date: 8/12/2021  1. No radiographic evidence of acute cardiopulmonary process.  Signed by Dr Vanessa Santos       Echo:   Summary   Normal left ventricular size with visually reduced LV function and an   estimated ejection fraction of approximately 40%. Mild-moderate global   hypokinesis motion. Normal LAP. Grade I diastolic dysfunction. Normal size left atrium. No evidence for PFO/ASD by color flow Doppler. Normal right ventricular size with grossly preserved RV function. Normal right atrial dimension. Mild eccentric, posteriorly directed mitral regurgitation. Moderate aortic regurgitation. No aortic stenosis is noted. Mild tricuspid regurgitation. RVSP within normal limits. Aortic root is within normal limits. The ascending aorta and arch are   poorly visualized. No evidence of significant pericardial effusion is noted. The rhythm is sinus. ABNORMAL study. Compared to study report dated 9/24/2018, the LVEF is now   estimated at 40% and the degree of aortic regurgitation is now moderate. Micro: following blood cultures, no growth to date    Assessment/Plan   Principal Problem:    Acute cholecystitis-               - General surgery following                           - recommends no surgical intervention at this time given her advanced age and CV status                          - also recommends continuing current conservative management with antibiotics. -recommends HIDA scan              - cefepime and flagyl              - IVFs              - Monitor labs closely     -increased diet        Active Problems:    Fall at home 2/2 Syncope-               - ECHO showed reduced LV function, EF of 40%, mil-moderate global hypokinesis motion, grade 1 diastolic dysfunction, moderate aortic regurgitation, and mild tricuspid regurgitation.               - EKG              - Fall precautions    -PT/OT         Essential hypertension- noted, stable at this time, continue current medications        Type 2 diabetes mellitus with circulatory disorder ([facility] [address]- HgbA1c 5.3, d/c SSI.   Accu-Cheks, hypoglycemia treatment protocol in place       History of stroke- noted, continue current medication        Antibiotic: cefepime and flagyl     DVT Prophylaxis: Lovenox     Juanpablo Handley, APRN - CNP, 8/14/2021 1:58 PM

## 2021-08-14 NOTE — PROGRESS NOTES
PATIENT ACCIDENTALLY PULLED OUT IV  ACCORDING TO PATIENT WHEN SHE \"SNAGGED IT ON THE BED RAIL\" I EXPLAINED TO PATIENT THAT SHE WOULD NEED A NEW IV FOR THE IVF'S AND ANTIBIOTICS THAT THE PHYSICIAN HAD ORDERD. 3 DIFFERENT NURSES ATTEMPTED TO GET AN IV STARTED ON PATIENT BUT SHE ADAMANTLY REFUSED. PATIENT IS ALERT AND ORIENTED x 3. I EDUCATED PATIENT ON THE HEALTH RISKS SHE COULD FACE BY REFUSING IV THERAPY AND PATIENT STATED THAT SHE WAS AWARE OF ANY CONSEQUENCES FROM HER DECISIONS AND SHE STATED AT THIS POINT \"SHE WAS LEAVING IT UP TO GOD TO HEAL HER\". DOCTOR AWARE. AND WILL REATTEMPT  IV ACCESS AT A LATER TIME.

## 2021-08-14 NOTE — PLAN OF CARE
Discharge Planning:  Goal: Discharged to appropriate level of care  Description: Discharged to appropriate level of care  Outcome: Ongoing     Problem: Cardiac:  Goal: Complications related to the disease process, condition or treatment will be avoided or minimized  Description: Complications related to the disease process, condition or treatment will be avoided or minimized  Outcome: Ongoing  Goal: Hemodynamic stability will improve  Description: Hemodynamic stability will improve  Outcome: Ongoing  Goal: Cerebral tissue perfusion will improve  Description: Cerebral tissue perfusion will improve  Outcome: Ongoing     Problem: Coping:  Goal: Ability to identify and develop effective coping behavior will improve  Description: Ability to identify and develop effective coping behavior will improve  Outcome: Ongoing     Problem: Nutritional:  Goal: Ability to identify appropriate dietary choices will improve  Description: Ability to identify appropriate dietary choices will improve  Outcome: Ongoing     Problem: ABCDS Injury Assessment  Goal: Absence of physical injury  Outcome: Ongoing

## 2021-08-15 LAB
ALBUMIN SERPL-MCNC: 3.6 G/DL (ref 3.5–5.2)
ALP BLD-CCNC: 66 U/L (ref 35–104)
ALT SERPL-CCNC: <5 U/L (ref 5–33)
ANION GAP SERPL CALCULATED.3IONS-SCNC: 11 MMOL/L (ref 7–19)
AST SERPL-CCNC: 16 U/L (ref 5–32)
BASOPHILS ABSOLUTE: 0 K/UL (ref 0–0.2)
BASOPHILS RELATIVE PERCENT: 0 % (ref 0–1)
BILIRUB SERPL-MCNC: 1.7 MG/DL (ref 0.2–1.2)
BUN BLDV-MCNC: 6 MG/DL (ref 8–23)
CALCIUM SERPL-MCNC: 9.1 MG/DL (ref 8.8–10.2)
CHLORIDE BLD-SCNC: 109 MMOL/L (ref 98–111)
CO2: 24 MMOL/L (ref 22–29)
CREAT SERPL-MCNC: 0.8 MG/DL (ref 0.5–0.9)
EOSINOPHILS ABSOLUTE: 0.51 K/UL (ref 0–0.6)
EOSINOPHILS RELATIVE PERCENT: 8 % (ref 0–5)
GFR AFRICAN AMERICAN: >59
GFR NON-AFRICAN AMERICAN: >60
GLUCOSE BLD-MCNC: 115 MG/DL (ref 70–99)
GLUCOSE BLD-MCNC: 128 MG/DL (ref 70–99)
GLUCOSE BLD-MCNC: 91 MG/DL (ref 74–109)
HCT VFR BLD CALC: 37.2 % (ref 37–47)
HEMOGLOBIN: 12.8 G/DL (ref 12–16)
IMMATURE GRANULOCYTES #: 0 K/UL
LYMPHOCYTES ABSOLUTE: 1.9 K/UL (ref 1.1–4.5)
LYMPHOCYTES RELATIVE PERCENT: 29 % (ref 20–40)
MAGNESIUM: 2 MG/DL (ref 1.7–2.3)
MCH RBC QN AUTO: 31.6 PG (ref 27–31)
MCHC RBC AUTO-ENTMCNC: 34.4 G/DL (ref 33–37)
MCV RBC AUTO: 91.9 FL (ref 81–99)
MONOCYTES ABSOLUTE: 0.3 K/UL (ref 0–0.9)
MONOCYTES RELATIVE PERCENT: 4 % (ref 0–10)
NEUTROPHILS ABSOLUTE: 3.8 K/UL (ref 1.5–7.5)
NEUTROPHILS RELATIVE PERCENT: 59 % (ref 50–65)
PDW BLD-RTO: 12.1 % (ref 11.5–14.5)
PERFORMED ON: ABNORMAL
PERFORMED ON: ABNORMAL
PLATELET # BLD: 121 K/UL (ref 130–400)
PLATELET SLIDE REVIEW: ADEQUATE
PMV BLD AUTO: 11 FL (ref 9.4–12.3)
POTASSIUM REFLEX MAGNESIUM: 3.5 MMOL/L (ref 3.5–5)
RBC # BLD: 4.05 M/UL (ref 4.2–5.4)
SODIUM BLD-SCNC: 144 MMOL/L (ref 136–145)
TOTAL PROTEIN: 6.4 G/DL (ref 6.6–8.7)
WBC # BLD: 6.4 K/UL (ref 4.8–10.8)

## 2021-08-15 PROCEDURE — 36415 COLL VENOUS BLD VENIPUNCTURE: CPT

## 2021-08-15 PROCEDURE — 82947 ASSAY GLUCOSE BLOOD QUANT: CPT

## 2021-08-15 PROCEDURE — 2500000003 HC RX 250 WO HCPCS: Performed by: HOSPITALIST

## 2021-08-15 PROCEDURE — 97530 THERAPEUTIC ACTIVITIES: CPT

## 2021-08-15 PROCEDURE — 6360000002 HC RX W HCPCS: Performed by: NURSE PRACTITIONER

## 2021-08-15 PROCEDURE — 6370000000 HC RX 637 (ALT 250 FOR IP): Performed by: NURSE PRACTITIONER

## 2021-08-15 PROCEDURE — 99232 SBSQ HOSP IP/OBS MODERATE 35: CPT | Performed by: SURGERY

## 2021-08-15 PROCEDURE — 85025 COMPLETE CBC W/AUTO DIFF WBC: CPT

## 2021-08-15 PROCEDURE — 97162 PT EVAL MOD COMPLEX 30 MIN: CPT

## 2021-08-15 PROCEDURE — 83735 ASSAY OF MAGNESIUM: CPT

## 2021-08-15 PROCEDURE — 80053 COMPREHEN METABOLIC PANEL: CPT

## 2021-08-15 PROCEDURE — 6370000000 HC RX 637 (ALT 250 FOR IP): Performed by: HOSPITALIST

## 2021-08-15 PROCEDURE — 1210000000 HC MED SURG R&B

## 2021-08-15 RX ORDER — METRONIDAZOLE 500 MG/1
500 TABLET ORAL EVERY 8 HOURS SCHEDULED
Status: DISCONTINUED | OUTPATIENT
Start: 2021-08-15 | End: 2021-08-19 | Stop reason: HOSPADM

## 2021-08-15 RX ORDER — CIPROFLOXACIN 500 MG/1
500 TABLET, FILM COATED ORAL EVERY 12 HOURS SCHEDULED
Status: DISCONTINUED | OUTPATIENT
Start: 2021-08-15 | End: 2021-08-18

## 2021-08-15 RX ADMIN — ASPIRIN 81 MG: 81 TABLET, COATED ORAL at 08:44

## 2021-08-15 RX ADMIN — CIPROFLOXACIN 500 MG: 500 TABLET, FILM COATED ORAL at 19:55

## 2021-08-15 RX ADMIN — ATORVASTATIN CALCIUM 20 MG: 20 TABLET, FILM COATED ORAL at 08:44

## 2021-08-15 RX ADMIN — ENOXAPARIN SODIUM 40 MG: 40 INJECTION SUBCUTANEOUS at 18:21

## 2021-08-15 RX ADMIN — METRONIDAZOLE 500 MG: 500 TABLET ORAL at 19:55

## 2021-08-15 RX ADMIN — NIFEDIPINE 60 MG: 60 TABLET, EXTENDED RELEASE ORAL at 08:44

## 2021-08-15 RX ADMIN — METRONIDAZOLE 500 MG: 500 INJECTION, SOLUTION INTRAVENOUS at 00:40

## 2021-08-15 RX ADMIN — METRONIDAZOLE 500 MG: 500 TABLET ORAL at 13:59

## 2021-08-15 RX ADMIN — ACETAMINOPHEN 650 MG: 325 TABLET ORAL at 08:46

## 2021-08-15 RX ADMIN — METRONIDAZOLE 500 MG: 500 INJECTION, SOLUTION INTRAVENOUS at 08:44

## 2021-08-15 ASSESSMENT — ENCOUNTER SYMPTOMS
BACK PAIN: 0
COLOR CHANGE: 0
ABDOMINAL PAIN: 1
VOMITING: 0
NAUSEA: 0

## 2021-08-15 ASSESSMENT — PAIN DESCRIPTION - DESCRIPTORS: DESCRIPTORS: SHARP;OTHER (COMMENT)

## 2021-08-15 ASSESSMENT — PAIN SCALES - GENERAL
PAINLEVEL_OUTOF10: 5
PAINLEVEL_OUTOF10: 5
PAINLEVEL_OUTOF10: 0

## 2021-08-15 ASSESSMENT — PAIN DESCRIPTION - PAIN TYPE: TYPE: ACUTE PAIN

## 2021-08-15 ASSESSMENT — PAIN DESCRIPTION - FREQUENCY: FREQUENCY: INTERMITTENT

## 2021-08-15 ASSESSMENT — PAIN DESCRIPTION - LOCATION: LOCATION: ABDOMEN

## 2021-08-15 ASSESSMENT — PAIN - FUNCTIONAL ASSESSMENT: PAIN_FUNCTIONAL_ASSESSMENT: PREVENTS OR INTERFERES SOME ACTIVE ACTIVITIES AND ADLS

## 2021-08-15 NOTE — PLAN OF CARE
limits will improve  Description: Ability to maintain clinical measurements within normal limits will improve  Outcome: Ongoing     Problem: Sensory:  Goal: Ability to identify factors that increase the pain will improve  Description: Ability to identify factors that increase the pain will improve  Outcome: Ongoing  Goal: Ability to notify healthcare provider of pain before it becomes unmanageable or unbearable will improve  Description: Ability to notify healthcare provider of pain before it becomes unmanageable or unbearable will improve  Outcome: Ongoing  Goal: Pain level will decrease  Description: Pain level will decrease  Outcome: Ongoing     Problem: Falls - Risk of:  Goal: Will remain free from falls  Description: Will remain free from falls  Outcome: Ongoing  Goal: Absence of physical injury  Description: Absence of physical injury  Outcome: Ongoing     Problem: Infection:  Goal: Will remain free from infection  Description: Will remain free from infection  Outcome: Ongoing     Problem: Safety:  Goal: Free from accidental physical injury  Description: Free from accidental physical injury  Outcome: Ongoing  Goal: Free from intentional harm  Description: Free from intentional harm  Outcome: Ongoing     Problem: Daily Care:  Goal: Daily care needs are met  Description: Daily care needs are met  Outcome: Ongoing     Problem: Pain:  Goal: Patient's pain/discomfort is manageable  Description: Patient's pain/discomfort is manageable  Outcome: Ongoing     Problem: Skin Integrity:  Goal: Skin integrity will stabilize  Description: Skin integrity will stabilize  Outcome: Ongoing     Problem: Discharge Planning:  Goal: Patients continuum of care needs are met  Description: Patients continuum of care needs are met  Outcome: Ongoing     Problem: Bleeding:  Goal: Will show no signs and symptoms of excessive bleeding  Description: Will show no signs and symptoms of excessive bleeding  Outcome: Ongoing     Problem: Discharge Planning:  Goal: Discharged to appropriate level of care  Description: Discharged to appropriate level of care  Outcome: Ongoing     Problem: Cardiac:  Goal: Complications related to the disease process, condition or treatment will be avoided or minimized  Description: Complications related to the disease process, condition or treatment will be avoided or minimized  Outcome: Ongoing  Goal: Hemodynamic stability will improve  Description: Hemodynamic stability will improve  Outcome: Ongoing  Goal: Cerebral tissue perfusion will improve  Description: Cerebral tissue perfusion will improve  Outcome: Ongoing     Problem: Coping:  Goal: Ability to identify and develop effective coping behavior will improve  Description: Ability to identify and develop effective coping behavior will improve  Outcome: Ongoing     Problem: Nutritional:  Goal: Ability to identify appropriate dietary choices will improve  Description: Ability to identify appropriate dietary choices will improve  Outcome: Ongoing     Problem: ABCDS Injury Assessment  Goal: Absence of physical injury  Outcome: Ongoing

## 2021-08-15 NOTE — PROGRESS NOTES
Physical Therapy    Facility/Department: [facility] 3 WICHO/VAS/MED  Initial Assessment    NAME: Edwige Ross  : 1930  MRN: [id number]    Date of Service: 8/15/2021    Discharge Recommendations:  Continue to assess pending progress, 24 hour supervision or assist, Patient would benefit from continued therapy after discharge        Assessment   Body structures, Functions, Activity limitations: Decreased functional mobility ; Decreased strength;Decreased endurance;Decreased posture; Increased pain;Decreased balance;Decreased coordination  Assessment: Pt. will benefit from cont. PT to decrease impairments. Pt. a fall risk and should not attempt mobility on her own at this time. Pt. in a weakened state, and she would be unsafe to return to prior living arrangement (her own apt. alone) at this time. Pt. would benefit from strengthening, balance and endurance training prior to return to [facility]. Pt. needs to use RW, gait belt and have staff A with mobility. Encouraged pt to sit up in chair, but she did not agree to that today. Treatment Diagnosis: debility  Prognosis: Fair  Decision Making: Medium Complexity  PT Education: PT Role;Plan of Care;General Safety;Goals;Gait Training;Functional Mobility Training;Transfer Training  Patient Education: use of call light for all needs  Barriers to Learning: none noted  REQUIRES PT FOLLOW UP: Yes  Activity Tolerance  Activity Tolerance: Patient limited by fatigue;Patient limited by endurance       Patient Diagnosis(es): The primary encounter diagnosis was Acute cholecystitis. Diagnoses of Elevated troponin and Syncope and collapse were also pertinent to this visit.      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 has a past surgical history that includes Hysterectomy; Cardiac catheterization (09/25/2003); Colonoscopy; and eye surgery. Restrictions  Restrictions/Precautions  Restrictions/Precautions: Fall Risk  Required Braces or Orthoses?: No  Vision/Hearing        Subjective  General  Chart Reviewed: Yes  Patient assessed for rehabilitation services?: Yes  Response To Previous Treatment: Not applicable  Family / Caregiver Present: No  Referring Practitioner: CHERELLE Villalta CNP  Referral Date : 08/12/21  Diagnosis: syncope and collapse, acute cholecystitis, elevated troponin  Follows Commands: Within Functional Limits  General Comment  Comments: RN, Go Cloe PT and entered room to A with pt's linen and gown change. Subjective  Subjective: Pt. states she is so weak and her legs don't work. Pain Screening  Patient Currently in Pain: Yes  Pain Assessment  Pain Assessment: 0-10  Pain Level: 5  Pain Type: Acute pain  Pain Location: Abdomen  Pain Descriptors: Arnold Held; Other (Comment) (pt has difficulty explaining pain sensation, but grabs at abdomen.)  Pain Frequency: Intermittent  Functional Pain Assessment: Prevents or interferes some active activities and ADLs  Non-Pharmaceutical Pain Intervention(s): Repositioned; Ambulation/Increased Activity  Response to Pain Intervention: Patient Satisfied  Vital Signs  Patient Currently in Pain: Yes  Pre Treatment Pain Screening  Intervention List: Patient able to continue with treatment;Nurse/physician notified    Orientation  Orientation  Overall Orientation Status: Within Functional Limits  Social/Functional History  Social/Functional History  Lives With: Alone  [facility]  Home Layout: One level  Home Access: Elevator  Bathroom Toilet: Handicap height  Home Equipment: 4 wheeled walker, [address] Help From: Friend(s)  ADL Assistance: Independent  Ambulation Assistance: Independent  Transfer Assistance: Independent  Cognition   Cognition  Overall Cognitive Status: WFL    Objective     Observation/Palpation  Posture: Fair  Observation: forward head, rounded shoulders    AROM RLE (degrees)  RLE AROM: WFL  AROM LLE (degrees)  LLE AROM : WFL  Strength RLE  Strength RLE: Exception  Comment: 3/5 knees and ankles, hips 3-/5  Strength LLE  Strength LLE: Exception  Comment: 3/5 knees and ankles, hips 3-/5     Sensation  Overall Sensation Status: Impaired (difficulty with sensation \"at times\")  Bed mobility  Rolling to Right: Minimal assistance  Supine to Sit: Moderate assistance  Sit to Supine: Minimal assistance  Scooting: Maximal assistance;2 Person assistance  Comment: pt agreed to get up to the chair while bed being changed  Transfers  Sit to Stand: Minimal Assistance  Stand to sit: Minimal Assistance  Bed to Chair:  (no AD used)  Ambulation  Ambulation?: Yes  Ambulation 1  Surface: level tile  Device: Rolling Walker  Assistance: Minimal assistance  Quality of Gait: unsteady  Gait Deviations: Slow Franchesca;Decreased step length;Decreased step height  Distance: 1' to bed after gown and bed linen change  Comments: pt found to be wet in bed with purewick leaking     Balance  Posture: Fair  Sitting - Static: Fair;+  Sitting - Dynamic: Fair;-  Standing - Static: Poor;+  Standing - Dynamic: Poor        Plan   Plan  Times per week: 3-7  Times per day: Daily  Plan weeks: 2  Current Treatment Recommendations: Strengthening, ROM, Balance Training, Functional Mobility Training, Transfer Training, Endurance Training, Gait Training, Safety Education & Training, Positioning, Patient/Caregiver Education & Training  Plan Comment: cont. PT per POC.   Safety Devices  Type of devices: Patient at risk for falls, Nurse notified, Gait belt, Left in bed, Call light within reach, Bed alarm in place    G-Code       OutComes Score                                                  AM-PAC Score             Goals  Short term goals  Time Frame for Short term goals: 2 wks  Short term goal 1: supine to sit indep  Short term goal 2: sit to stand indep  Short term goal 3: amb. 100' with RW SBA  Short term goal 4: bed to chair SBA  Patient Goals   Patient goals : get stonger       Therapy Time   Individual Concurrent Group Co-treatment   Time In           Time Out           Minutes                   Amisha Thakkar PT     Electronically signed by Amisha Thakkar PT on 8/15/2021 at 5:35 PM

## 2021-08-15 NOTE — PROGRESS NOTES
[facility] General Surgery Progress Note    Chief Complaint:   Chief Complaint   Patient presents with   Austin Hussein     arrived via EMS from home with a fall, pt reports she fell in hallway hitting back of head       SUBJECTIVE:  Ms. Michael Villa is a 80 y.o. female is seen and examined at bedside. Abdominal pain significantly improved this a.m. Patient is hungry and requesting breakfast. She denies fever, chills, chest pain, SOB, n/v/d/c.      OBJECTIVE:  /70   Pulse 59   Temp 97.7 °F (36.5 °C)   Resp 16   Ht 5' 3\" (1.6 m)   Wt 115 lb 4.8 oz (52.3 kg)   SpO2 96%   BMI 20.42 kg/m²   CONSTITUTIONAL: Alert, appropriate, no acute distress  SKIN: warm, dry with no rashes or lesions  HEENT: NCAT, Non icteric, PERR. Trachea Midline. CHEST/LUNGS: CTA bilaterally. Normal respiratory effort. CARDIOVASCULAR: RRR, No edema. ABDOMEN: Abdomen soft without tenderness to palpation. NEUROLOGIC: CN II-XI grossly intact, no motor or sensory deficits   MUSCULOSKELETAL: No clubbing or cyanosis. PSYCHIATRIC:  Normal Mood and Affect. Alert and Oriented. Labs:  CBC:   Recent Labs     08/13/21  0259 08/14/21 0219 08/15/21  0418   WBC 7.5 6.9 6.4   HGB 11.8* 11.9* 12.8   * 115* 121*     BMP:    Recent Labs     08/13/21  0259 08/14/21 0219 08/15/21  0418   * 145 144   K 3.6 3.6 3.5    109 109   CO2 27 25 24   BUN 8 9 6*   CREATININE 0.9 1.1* 0.8   GLUCOSE 77 81 91       ASSESSMENT:  Principal Problem:    Acute cholecystitis  Active Problems:    Essential hypertension    Type 2 diabetes mellitus with circulatory disorder (HCC)    History of stroke    Syncope    Fall at home  Resolved Problems:    * No resolved hospital problems.  *      PLAN:  Continue conservative management   Continue to advance diet  N.p.o. after midnight for HIDA scan tomorrow  If obstruction noted patient will either require a cholecystostomy tube versus cholecystectomy  Continue antibiotics for possible cholecystitis  Would recommend nonoperative management if the patient tolerates as she is very high risk for surgical intervention due to CVA history and current CV status    Vito Lehman DO   Electronically Signed on 8/15/2021 at 9:01 AM

## 2021-08-16 ENCOUNTER — APPOINTMENT (OUTPATIENT)
Dept: NUCLEAR MEDICINE | Age: 86
DRG: 445 | End: 2021-08-16
Payer: MEDICARE

## 2021-08-16 LAB
ALBUMIN SERPL-MCNC: 3.6 G/DL (ref 3.5–5.2)
ALP BLD-CCNC: 76 U/L (ref 35–104)
ALT SERPL-CCNC: 10 U/L (ref 5–33)
ANION GAP SERPL CALCULATED.3IONS-SCNC: 11 MMOL/L (ref 7–19)
AST SERPL-CCNC: 39 U/L (ref 5–32)
BASOPHILS ABSOLUTE: 0 K/UL (ref 0–0.2)
BASOPHILS RELATIVE PERCENT: 0 % (ref 0–1)
BILIRUB SERPL-MCNC: 1.2 MG/DL (ref 0.2–1.2)
BUN BLDV-MCNC: 9 MG/DL (ref 8–23)
CALCIUM SERPL-MCNC: 9.2 MG/DL (ref 8.8–10.2)
CHLORIDE BLD-SCNC: 110 MMOL/L (ref 98–111)
CO2: 24 MMOL/L (ref 22–29)
CREAT SERPL-MCNC: 0.8 MG/DL (ref 0.5–0.9)
EOSINOPHILS ABSOLUTE: 0.12 K/UL (ref 0–0.6)
EOSINOPHILS RELATIVE PERCENT: 2 % (ref 0–5)
GFR AFRICAN AMERICAN: >59
GFR NON-AFRICAN AMERICAN: >60
GLUCOSE BLD-MCNC: 119 MG/DL (ref 74–109)
GLUCOSE BLD-MCNC: 89 MG/DL (ref 70–99)
GLUCOSE BLD-MCNC: 93 MG/DL (ref 70–99)
GLUCOSE BLD-MCNC: 94 MG/DL (ref 70–99)
GLUCOSE BLD-MCNC: 97 MG/DL (ref 70–99)
HCT VFR BLD CALC: 38.3 % (ref 37–47)
HEMOGLOBIN: 13.1 G/DL (ref 12–16)
IMMATURE GRANULOCYTES #: 0 K/UL
LYMPHOCYTES ABSOLUTE: 1.5 K/UL (ref 1.1–4.5)
LYMPHOCYTES RELATIVE PERCENT: 24 % (ref 20–40)
MAGNESIUM: 1.9 MG/DL (ref 1.7–2.3)
MCH RBC QN AUTO: 31 PG (ref 27–31)
MCHC RBC AUTO-ENTMCNC: 34.2 G/DL (ref 33–37)
MCV RBC AUTO: 90.5 FL (ref 81–99)
MONOCYTES ABSOLUTE: 0.3 K/UL (ref 0–0.9)
MONOCYTES RELATIVE PERCENT: 5 % (ref 0–10)
NEUTROPHILS ABSOLUTE: 4.3 K/UL (ref 1.5–7.5)
NEUTROPHILS RELATIVE PERCENT: 69 % (ref 50–65)
PDW BLD-RTO: 11.9 % (ref 11.5–14.5)
PERFORMED ON: NORMAL
PLATELET # BLD: 134 K/UL (ref 130–400)
PLATELET SLIDE REVIEW: ADEQUATE
PMV BLD AUTO: 10.3 FL (ref 9.4–12.3)
POTASSIUM REFLEX MAGNESIUM: 3.4 MMOL/L (ref 3.5–5)
RBC # BLD: 4.23 M/UL (ref 4.2–5.4)
SODIUM BLD-SCNC: 145 MMOL/L (ref 136–145)
TOTAL PROTEIN: 6.5 G/DL (ref 6.6–8.7)
WBC # BLD: 6.2 K/UL (ref 4.8–10.8)

## 2021-08-16 PROCEDURE — 80053 COMPREHEN METABOLIC PANEL: CPT

## 2021-08-16 PROCEDURE — A9537 TC99M MEBROFENIN: HCPCS | Performed by: FAMILY MEDICINE

## 2021-08-16 PROCEDURE — 36415 COLL VENOUS BLD VENIPUNCTURE: CPT

## 2021-08-16 PROCEDURE — 78226 HEPATOBILIARY SYSTEM IMAGING: CPT

## 2021-08-16 PROCEDURE — 6370000000 HC RX 637 (ALT 250 FOR IP): Performed by: NURSE PRACTITIONER

## 2021-08-16 PROCEDURE — 1210000000 HC MED SURG R&B

## 2021-08-16 PROCEDURE — 6370000000 HC RX 637 (ALT 250 FOR IP): Performed by: HOSPITALIST

## 2021-08-16 PROCEDURE — 6360000002 HC RX W HCPCS: Performed by: NURSE PRACTITIONER

## 2021-08-16 PROCEDURE — 85025 COMPLETE CBC W/AUTO DIFF WBC: CPT

## 2021-08-16 PROCEDURE — 82947 ASSAY GLUCOSE BLOOD QUANT: CPT

## 2021-08-16 PROCEDURE — 3430000000 HC RX DIAGNOSTIC RADIOPHARMACEUTICAL: Performed by: FAMILY MEDICINE

## 2021-08-16 PROCEDURE — 99232 SBSQ HOSP IP/OBS MODERATE 35: CPT | Performed by: SURGERY

## 2021-08-16 PROCEDURE — 83735 ASSAY OF MAGNESIUM: CPT

## 2021-08-16 PROCEDURE — 2580000003 HC RX 258: Performed by: NURSE PRACTITIONER

## 2021-08-16 RX ORDER — NIFEDIPINE 60 MG/1
60 TABLET, FILM COATED, EXTENDED RELEASE ORAL DAILY
Qty: 30 TABLET | Refills: 3 | Status: SHIPPED | OUTPATIENT
Start: 2021-08-17 | End: 2021-08-19 | Stop reason: HOSPADM

## 2021-08-16 RX ORDER — METRONIDAZOLE 500 MG/1
500 TABLET ORAL EVERY 8 HOURS SCHEDULED
Qty: 12 TABLET | Refills: 0 | Status: SHIPPED | OUTPATIENT
Start: 2021-08-16 | End: 2021-08-19

## 2021-08-16 RX ORDER — CIPROFLOXACIN 500 MG/1
500 TABLET, FILM COATED ORAL EVERY 12 HOURS SCHEDULED
Qty: 8 TABLET | Refills: 0 | Status: SHIPPED | OUTPATIENT
Start: 2021-08-16 | End: 2021-08-19

## 2021-08-16 RX ADMIN — METRONIDAZOLE 500 MG: 500 TABLET ORAL at 17:30

## 2021-08-16 RX ADMIN — POTASSIUM CHLORIDE 40 MEQ: 1500 TABLET, EXTENDED RELEASE ORAL at 20:39

## 2021-08-16 RX ADMIN — METRONIDAZOLE 500 MG: 500 TABLET ORAL at 05:46

## 2021-08-16 RX ADMIN — Medication 5 MILLICURIE: at 15:43

## 2021-08-16 RX ADMIN — ATORVASTATIN CALCIUM 20 MG: 20 TABLET, FILM COATED ORAL at 08:22

## 2021-08-16 RX ADMIN — SODIUM CHLORIDE, PRESERVATIVE FREE 10 ML: 5 INJECTION INTRAVENOUS at 20:39

## 2021-08-16 RX ADMIN — ASPIRIN 81 MG: 81 TABLET, COATED ORAL at 08:22

## 2021-08-16 RX ADMIN — METRONIDAZOLE 500 MG: 500 TABLET ORAL at 20:39

## 2021-08-16 RX ADMIN — CIPROFLOXACIN 500 MG: 500 TABLET, FILM COATED ORAL at 20:39

## 2021-08-16 RX ADMIN — CIPROFLOXACIN 500 MG: 500 TABLET, FILM COATED ORAL at 08:22

## 2021-08-16 RX ADMIN — ENOXAPARIN SODIUM 40 MG: 40 INJECTION SUBCUTANEOUS at 17:30

## 2021-08-16 RX ADMIN — NIFEDIPINE 60 MG: 60 TABLET, EXTENDED RELEASE ORAL at 08:21

## 2021-08-16 ASSESSMENT — PAIN SCALES - GENERAL
PAINLEVEL_OUTOF10: 0
PAINLEVEL_OUTOF10: 0

## 2021-08-16 NOTE — PROGRESS NOTES
Progress Note  Date:2021       Room:0312/312-01  Patient Name:Janiya Oliver     Date of Birth:[birth year]     Age:89 y.o. Subjective    Subjective: 51-year-old lady with a history of type 2 diabetes, prior CVA x2, GERD, presented to the hospital after a fall at home.  Further work-up in the ED also revealed patient been having abdominal pain and was noted to have acute cholecystitis.  Patient denied any chest pain but stated she felt some dizziness and lightheadedness prior to the fall.  Noted to have troponin of 0.08 on admission which is downtrending currently 0.06, discussed with daughter in terms of plan moving forward. April Coles patient's age, and medical comorbidities patient is at a high risk for surgery.  Discussed with general surgeon and at this time recommending conservative management with continuation of antibiotics for cholecystitis, monitor patient's clinical status . Patient's echocardiogram compared to 2018 showed decreased LV ejection fraction now to 40% and moderately aortic regurgitations.  HIDA shows nonfilling of the gallbladder concerning for mechanical cystic duct obstruction. Awaiting general surgery further plans. Patient was seen this morning in her room, wondering when she can have something to eat. Today she still having some right upper quadrant abdominal pain. Denied any nausea or emesis. Currently awaiting HIDA study. Review of Systems: 12 point system review negative suggested above. Objective         Vitals Last 24 Hours:  TEMPERATURE:  Temp  Av.9 °F (36.1 °C)  Min: 96.2 °F (35.7 °C)  Max: 97.6 °F (36.4 °C)  RESPIRATIONS RANGE: Resp  Avg: 15.5  Min: 14  Max: 16  PULSE OXIMETRY RANGE: SpO2  Av.3 %  Min: 96 %  Max: 98 %  PULSE RANGE: Pulse  Av.3  Min: 58  Max: 66  BLOOD PRESSURE RANGE: Systolic (07BLB), UZV:700 , Min:132 , GEK:056   ; Diastolic (90DJW), MXT:35, Min:58, Max:64    I/O (24Hr):     Intake/Output Summary (Last 24 hours) at 2021 [address] filed at 8/16/2021 1030  Gross per 24 hour   Intake 200 ml   Output 300 ml   Net -100 ml       Physical Examination:  General: Well-developed, no acute distress lying comfortably in bed. HEENT: Atraumatic normocephalic, range of motion normal JVD, no tracheal deviation noted. Cardiac: Normal S1-S2 no murmurs rub or gallop. Respiratory: clear To auscultation bilaterally, no rhonchi or rales, no wheezing  Abdomen: Soft, positive bowel sounds in all quadrants, no distention, + tenderness to RUQ, no organomegaly noted, no rebound noted. Extremities: no tenderness, no edema, moves all extremities  Psych: Affect normal and good eye contact, behavioral normal.        Labs/Imaging/Diagnostics    Labs:  CBC:  Recent Labs     08/14/21  0219 08/15/21  0418 08/16/21  0204   WBC 6.9 6.4 6.2   RBC 3.90* 4.05* 4.23   HGB 11.9* 12.8 13.1   HCT 35.8* 37.2 38.3   MCV 91.8 91.9 90.5   RDW 12.3 12.1 11.9   * 121* 134     CHEMISTRIES:  Recent Labs     08/14/21  0219 08/15/21  0418 08/16/21  0204    144 145   K 3.6 3.5 3.4*    109 110   CO2 25 24 24   BUN 9 6* 9   CREATININE 1.1* 0.8 0.8   GLUCOSE 81 91 119*   MG  --  2.0 1.9     PT/INR:No results for input(s): PROTIME, INR in the last 72 hours. APTT:No results for input(s): APTT in the last 72 hours. LIVER PROFILE:  Recent Labs     08/14/21  0219 08/15/21  0418 08/16/21  0204   AST 15 16 39*   ALT <5* <5* 10   BILITOT 1.8* 1.7* 1.2   ALKPHOS 66 66 76       Imaging Last 24 Hours:  NM HEPATOBILIARY    Result Date: 8/16/2021  NM HEPATOBILIARY 8/16/2021 2:02 PM HISTORY: Abnormal CT and ultrasound, concern for acute cholecystitis Comparison: CT scan dated 8/12/2021 Radiopharmaceutical: 5 mCi technetium 99m Mebrofenin IV. Technique: Anterior images were acquired over the upper abdomen for a period of 60 minutes following intravenous administration of the radiopharmaceutical. Findings:  Symmetric liver parenchymal uptake.  There is progression of the isotope from the liver into the intrahepatic biliary ducts and common bile duct, with the common bile duct well visible by the 20th minute. Progressive accumulation of radiotracer within the common duct with accumulation in the duodenum. There is a small focus of contrast pooling in the region of the gallbladder fossa around the 45-50 minute time points, however, on the subsequent imaging this is revealed to be pooling in the small bowel. Impression: 1. HIDA scan shows non-filling of the gallbladder, concerning for mechanical cystic duct obstruction. Signed by Dr Castillo Villalobos    Assessment//Plan           Hospital Problems         Last Modified POA    * (Principal) Acute cholecystitis 8/12/2021 Yes    Essential hypertension 8/12/2021 Yes    Type 2 diabetes mellitus with circulatory disorder ([address] (Chronic) 8/12/2021 Yes    History of stroke 8/12/2021 Yes    Syncope 8/12/2021 Yes    Fall at home 8/12/2021 Yes               R sided weakness following generalized weakness, POA - now at 36 hrs, CT (-) but MRI shows subacute L basal ganglia stroke without hemorrhage. TTE (-)             Assessment & Plan      Acute cholecystitis  Initially on broad-spectrum antibiotics Zosyn and transition to Cipro and Flagyl. Patient tolerating oral antibiotics well with improvement in clinical status. Evaluated by general surgery recommend no surgical intervention at this time given patient's advanced age and medical comorbidities. HIDA scan obtained today which shows nonfilling of the gallbladder awaiting general surgery final recommendation. Hyperglycemia  Patient carries prior history of type 2 diabetes however A1c noted to be 5.3. Monitor blood sugars in-house  Hypoglycemia protocol. Fall at home prior to admission secondary to syncope  Echo with reduced LV function EF of 40% compared to prior echocardiogram.  Moderate global hypokinesis with grade 1 diastolic dysfunction and moderate aortic regurgitation.   No

## 2021-08-16 NOTE — PROGRESS NOTES
Patient A/O continues to have abdominal pain RUQ Dx acute cholecystitis. Pending Hyda scan this AM.  No IV access at this time. Patient is a very hard stick and has been stuck multiple time. IV team has been consulted for placement of peripheral access. Ibrahim catheter placed per nuclear medicine protocol. Patient is incontinent and due to medication required for testing, ibrahim catheter is required. Symptoms of Retinal tear and detachment reviewed. Patient understands to call immediately with any such symptoms.

## 2021-08-16 NOTE — PLAN OF CARE
Problem: Skin Integrity:  Goal: Will show no infection signs and symptoms  Description: Will show no infection signs and symptoms  Outcome: Ongoing  Goal: Absence of new skin breakdown  Description: Absence of new skin breakdown  Outcome: Ongoing     Problem: Activity:  Goal: Risk for activity intolerance will decrease  Description: Risk for activity intolerance will decrease  Outcome: Ongoing  Goal: Ability to tolerate increased activity will improve  Description: Ability to tolerate increased activity will improve  Outcome: Ongoing     Problem:  Bowel/Gastric:  Goal: Bowel function will improve  Description: Bowel function will improve  Outcome: Ongoing  Goal: Diagnostic test results will improve  Description: Diagnostic test results will improve  Outcome: Ongoing  Goal: Occurrences of nausea will decrease  Description: Occurrences of nausea will decrease  Outcome: Ongoing  Goal: Occurrences of vomiting will decrease  Description: Occurrences of vomiting will decrease  Outcome: Ongoing     Problem: Fluid Volume:  Goal: Maintenance of adequate hydration will improve  Description: Maintenance of adequate hydration will improve  Outcome: Ongoing     Problem: Health Behavior:  Goal: Ability to state signs and symptoms to report to health care provider will improve  Description: Ability to state signs and symptoms to report to health care provider will improve  Outcome: Ongoing  Goal: Identification of resources available to assist in meeting health care needs will improve  Description: Identification of resources available to assist in meeting health care needs will improve  Outcome: Ongoing     Problem: Physical Regulation:  Goal: Complications related to the disease process, condition or treatment will be avoided or minimized  Description: Complications related to the disease process, condition or treatment will be avoided or minimized  Outcome: Ongoing  Goal: Ability to maintain clinical measurements within normal limits will improve  Description: Ability to maintain clinical measurements within normal limits will improve  Outcome: Ongoing     Problem: Sensory:  Goal: Ability to identify factors that increase the pain will improve  Description: Ability to identify factors that increase the pain will improve  Outcome: Ongoing  Goal: Ability to notify healthcare provider of pain before it becomes unmanageable or unbearable will improve  Description: Ability to notify healthcare provider of pain before it becomes unmanageable or unbearable will improve  Outcome: Ongoing  Goal: Pain level will decrease  Description: Pain level will decrease  Outcome: Ongoing     Problem: Falls - Risk of:  Goal: Will remain free from falls  Description: Will remain free from falls  Outcome: Ongoing  Goal: Absence of physical injury  Description: Absence of physical injury  Outcome: Ongoing     Problem: Infection:  Goal: Will remain free from infection  Description: Will remain free from infection  Outcome: Ongoing     Problem: Safety:  Goal: Free from accidental physical injury  Description: Free from accidental physical injury  Outcome: Ongoing  Goal: Free from intentional harm  Description: Free from intentional harm  Outcome: Ongoing     Problem: Daily Care:  Goal: Daily care needs are met  Description: Daily care needs are met  Outcome: Ongoing     Problem: Pain:  Description: Pain management should include both nonpharmacologic and pharmacologic interventions.   Goal: Pain level will decrease  Description: Pain level will decrease  Outcome: Ongoing  Goal: Patient's pain/discomfort is manageable  Description: Patient's pain/discomfort is manageable  Outcome: Ongoing  Goal: Control of acute pain  Description: Control of acute pain  Outcome: Ongoing  Goal: Control of chronic pain  Description: Control of chronic pain  Outcome: Ongoing     Problem: Skin Integrity:  Goal: Skin integrity will stabilize  Description: Skin integrity will stabilize  Outcome: Ongoing     Problem: Discharge Planning:  Goal: Patients continuum of care needs are met  Description: Patients continuum of care needs are met  Outcome: Ongoing     Problem: Bleeding:  Goal: Will show no signs and symptoms of excessive bleeding  Description: Will show no signs and symptoms of excessive bleeding  Outcome: Ongoing     Problem: Discharge Planning:  Goal: Discharged to appropriate level of care  Description: Discharged to appropriate level of care  Outcome: Ongoing     Problem: Cardiac:  Goal: Complications related to the disease process, condition or treatment will be avoided or minimized  Description: Complications related to the disease process, condition or treatment will be avoided or minimized  Outcome: Ongoing  Goal: Hemodynamic stability will improve  Description: Hemodynamic stability will improve  Outcome: Ongoing  Goal: Cerebral tissue perfusion will improve  Description: Cerebral tissue perfusion will improve  Outcome: Ongoing     Problem: Coping:  Goal: Ability to identify and develop effective coping behavior will improve  Description: Ability to identify and develop effective coping behavior will improve  Outcome: Ongoing     Problem: Nutritional:  Goal: Ability to identify appropriate dietary choices will improve  Description: Ability to identify appropriate dietary choices will improve  Outcome: Ongoing     Problem: ABCDS Injury Assessment  Goal: Absence of physical injury  Outcome: Ongoing

## 2021-08-16 NOTE — PROGRESS NOTES
[facility] General Surgery Progress Note    Chief Complaint:   Chief Complaint   Patient presents with   Cassie Seaman     arrived via EMS from home with a fall, pt reports she fell in hallway hitting back of head       SUBJECTIVE:  Ms. Sherrell Elias is a 80 y.o. female is seen and examined at bedside. Diet throughout the day yesterday. Continues to feel better daily. She denies fever, chills, chest pain, SOB, n/v/d/c.      OBJECTIVE:  BP (!) 135/58   Pulse 66   Temp 96.2 °F (35.7 °C) (Temporal)   Resp 16   Ht 5' 3\" (1.6 m)   Wt 118 lb 2 oz (53.6 kg)   SpO2 98%   BMI 20.92 kg/m²   CONSTITUTIONAL: Alert, appropriate, no acute distress  SKIN: warm, dry with no rashes or lesions  HEENT: NCAT, Non icteric, PERR. Trachea Midline. CHEST/LUNGS: CTA bilaterally. Normal respiratory effort. CARDIOVASCULAR: RRR, No edema. ABDOMEN: Abdomen soft without tenderness to palpation. NEUROLOGIC: CN II-XI grossly intact, no motor or sensory deficits   MUSCULOSKELETAL: No clubbing or cyanosis. PSYCHIATRIC:  Normal Mood and Affect. Alert and Oriented. Labs:  CBC:   Recent Labs     08/14/21  0219 08/15/21  0418 08/16/21  0204   WBC 6.9 6.4 6.2   HGB 11.9* 12.8 13.1   * 121* 134     BMP:    Recent Labs     08/14/21  0219 08/15/21  0418 08/16/21  0204    144 145   K 3.6 3.5 3.4*    109 110   CO2 25 24 24   BUN 9 6* 9   CREATININE 1.1* 0.8 0.8   GLUCOSE 81 91 119*       ASSESSMENT:  Principal Problem:    Acute cholecystitis  Active Problems:    Essential hypertension    Type 2 diabetes mellitus with circulatory disorder (HCC)    History of stroke    Syncope    Fall at home  Resolved Problems:    * No resolved hospital problems.  *      PLAN:  Continue conservative management  N.p.o. this a.m. for HIDA scan  If obstruction noted patient will either require a cholecystostomy tube versus cholecystectomy  Continue antibiotics for possible cholecystitis  Would recommend nonoperative management if the patient tolerates as she is very high risk for surgical intervention due to CVA history and current CV status  If the gallbladder does show filling on HIDA scan would likely be stable to discharge if okay with medicine as the patient is now tolerating a diet and her pain is much better    Jerry Xiong DO   Electronically Signed on 8/16/2021 at 7:44 AM

## 2021-08-17 LAB
ALBUMIN SERPL-MCNC: 3.5 G/DL (ref 3.5–5.2)
ALP BLD-CCNC: 82 U/L (ref 35–104)
ALT SERPL-CCNC: 86 U/L (ref 5–33)
ANION GAP SERPL CALCULATED.3IONS-SCNC: 10 MMOL/L (ref 7–19)
APTT: 33.8 SEC (ref 26–36.2)
AST SERPL-CCNC: 171 U/L (ref 5–32)
BASOPHILS ABSOLUTE: 0 K/UL (ref 0–0.2)
BASOPHILS RELATIVE PERCENT: 0.6 % (ref 0–1)
BILIRUB SERPL-MCNC: 1.4 MG/DL (ref 0.2–1.2)
BLOOD CULTURE, ROUTINE: NORMAL
BUN BLDV-MCNC: 8 MG/DL (ref 8–23)
CALCIUM SERPL-MCNC: 9.2 MG/DL (ref 8.8–10.2)
CHLORIDE BLD-SCNC: 109 MMOL/L (ref 98–111)
CO2: 24 MMOL/L (ref 22–29)
CREAT SERPL-MCNC: 1 MG/DL (ref 0.5–0.9)
CULTURE, BLOOD 2: NORMAL
EOSINOPHILS ABSOLUTE: 0.5 K/UL (ref 0–0.6)
EOSINOPHILS RELATIVE PERCENT: 9.2 % (ref 0–5)
GFR AFRICAN AMERICAN: >59
GFR NON-AFRICAN AMERICAN: 52
GLUCOSE BLD-MCNC: 86 MG/DL (ref 74–109)
GLUCOSE BLD-MCNC: 88 MG/DL (ref 70–99)
GLUCOSE BLD-MCNC: 89 MG/DL (ref 70–99)
GLUCOSE BLD-MCNC: 96 MG/DL (ref 70–99)
GLUCOSE BLD-MCNC: 99 MG/DL (ref 70–99)
HCT VFR BLD CALC: 38.3 % (ref 37–47)
HEMOGLOBIN: 12.7 G/DL (ref 12–16)
IMMATURE GRANULOCYTES #: 0 K/UL
INR BLD: 1.24 (ref 0.88–1.18)
LYMPHOCYTES ABSOLUTE: 1.6 K/UL (ref 1.1–4.5)
LYMPHOCYTES RELATIVE PERCENT: 31 % (ref 20–40)
MCH RBC QN AUTO: 30.3 PG (ref 27–31)
MCHC RBC AUTO-ENTMCNC: 33.2 G/DL (ref 33–37)
MCV RBC AUTO: 91.4 FL (ref 81–99)
MONOCYTES ABSOLUTE: 0.6 K/UL (ref 0–0.9)
MONOCYTES RELATIVE PERCENT: 12 % (ref 0–10)
NEUTROPHILS ABSOLUTE: 2.5 K/UL (ref 1.5–7.5)
NEUTROPHILS RELATIVE PERCENT: 47 % (ref 50–65)
PDW BLD-RTO: 11.9 % (ref 11.5–14.5)
PERFORMED ON: NORMAL
PLATELET # BLD: 128 K/UL (ref 130–400)
PMV BLD AUTO: 10.9 FL (ref 9.4–12.3)
POTASSIUM REFLEX MAGNESIUM: 4.5 MMOL/L (ref 3.5–5)
PROTHROMBIN TIME: 15.7 SEC (ref 12–14.6)
RBC # BLD: 4.19 M/UL (ref 4.2–5.4)
SODIUM BLD-SCNC: 143 MMOL/L (ref 136–145)
TOTAL PROTEIN: 6.5 G/DL (ref 6.6–8.7)
WBC # BLD: 5.2 K/UL (ref 4.8–10.8)

## 2021-08-17 PROCEDURE — 85730 THROMBOPLASTIN TIME PARTIAL: CPT

## 2021-08-17 PROCEDURE — 82947 ASSAY GLUCOSE BLOOD QUANT: CPT

## 2021-08-17 PROCEDURE — 6370000000 HC RX 637 (ALT 250 FOR IP): Performed by: NURSE PRACTITIONER

## 2021-08-17 PROCEDURE — 80053 COMPREHEN METABOLIC PANEL: CPT

## 2021-08-17 PROCEDURE — 85025 COMPLETE CBC W/AUTO DIFF WBC: CPT

## 2021-08-17 PROCEDURE — 1210000000 HC MED SURG R&B

## 2021-08-17 PROCEDURE — 99232 SBSQ HOSP IP/OBS MODERATE 35: CPT | Performed by: SURGERY

## 2021-08-17 PROCEDURE — 85610 PROTHROMBIN TIME: CPT

## 2021-08-17 PROCEDURE — 36415 COLL VENOUS BLD VENIPUNCTURE: CPT

## 2021-08-17 PROCEDURE — 2580000003 HC RX 258: Performed by: NURSE PRACTITIONER

## 2021-08-17 RX ADMIN — SODIUM CHLORIDE, PRESERVATIVE FREE 10 ML: 5 INJECTION INTRAVENOUS at 07:57

## 2021-08-17 RX ADMIN — CIPROFLOXACIN 500 MG: 500 TABLET, FILM COATED ORAL at 21:56

## 2021-08-17 RX ADMIN — METRONIDAZOLE 500 MG: 500 TABLET ORAL at 13:18

## 2021-08-17 RX ADMIN — SODIUM CHLORIDE, PRESERVATIVE FREE 10 ML: 5 INJECTION INTRAVENOUS at 21:56

## 2021-08-17 RX ADMIN — METRONIDAZOLE 500 MG: 500 TABLET ORAL at 21:55

## 2021-08-17 RX ADMIN — ASPIRIN 81 MG: 81 TABLET, COATED ORAL at 07:56

## 2021-08-17 RX ADMIN — ACETAMINOPHEN 325 MG: 325 TABLET ORAL at 13:50

## 2021-08-17 RX ADMIN — METRONIDAZOLE 500 MG: 500 TABLET ORAL at 04:51

## 2021-08-17 RX ADMIN — CIPROFLOXACIN 500 MG: 500 TABLET, FILM COATED ORAL at 07:57

## 2021-08-17 RX ADMIN — ATORVASTATIN CALCIUM 20 MG: 20 TABLET, FILM COATED ORAL at 07:57

## 2021-08-17 ASSESSMENT — PAIN SCALES - GENERAL
PAINLEVEL_OUTOF10: 0
PAINLEVEL_OUTOF10: 0
PAINLEVEL_OUTOF10: 7

## 2021-08-17 NOTE — PROGRESS NOTES
[facility] General Surgery Progress Note    Chief Complaint:   Chief Complaint   Patient presents with   Adam Lombardi     arrived via EMS from home with a fall, pt reports she fell in hallway hitting back of head       SUBJECTIVE:  Ms. Toby Ceballos is a 80 y.o. female is seen and examined at bedside. Still with only minimal abdominal pain when eating. HIDA reviewed with the patient. She denies fever, chills, chest pain, SOB, n/v/d/c.      OBJECTIVE:  /61   Pulse 61   Temp 98.2 °F (36.8 °C) (Temporal)   Resp 18   Ht 5' 3\" (1.6 m)   Wt 118 lb 2 oz (53.6 kg)   SpO2 98%   BMI 20.92 kg/m²   CONSTITUTIONAL: Alert, appropriate, no acute distress  SKIN: warm, dry with no rashes or lesions  HEENT: NCAT, Non icteric, PERR. Trachea Midline. CHEST/LUNGS: CTA bilaterally. Normal respiratory effort. CARDIOVASCULAR: RRR, No edema. ABDOMEN: Abdomen soft without tenderness to palpation. NEUROLOGIC: CN II-XI grossly intact, no motor or sensory deficits   MUSCULOSKELETAL: No clubbing or cyanosis. PSYCHIATRIC:  Normal Mood and Affect. Alert and Oriented. Labs:  CBC:   Recent Labs     08/15/21  0418 08/16/21  0204 08/17/21  0148   WBC 6.4 6.2 5.2   HGB 12.8 13.1 12.7   * 134 128*     BMP:    Recent Labs     08/15/21  0418 08/16/21  0204 08/17/21  0148    145 143   K 3.5 3.4* 4.5    110 109   CO2 24 24 24   BUN 6* 9 8   CREATININE 0.8 0.8 1.0*   GLUCOSE 91 119* 86       ASSESSMENT:  Principal Problem:    Acute cholecystitis  Active Problems:    Essential hypertension    Type 2 diabetes mellitus with circulatory disorder (HCC)    History of stroke    Syncope    Fall at home  Resolved Problems:    * No resolved hospital problems.  *      PLAN:  Continue conservative management  Continue antibiotics for possible cholecystitis  Would recommend nonoperative management if the patient tolerates as she is very high risk for surgical intervention due to CVA history and current CV status    HIDA scan reviewed and shows nonfilling gallbladder which means there is a stone obstructing the cystic duct. Due to the high risk of surgery secondary to patient's comorbidities would recommend cholecystostomy tube placement to relieve the obstruction. I discussed this this morning with the patient and she would like to have a chance to talk to her daughter about it. I did try to call her daughter but she did not answer. I will try to call her throughout the day to discuss her mother's care.     Etelvina Anaya DO   Electronically Signed on 8/17/2021 at 8:48 AM

## 2021-08-17 NOTE — PROGRESS NOTES
08/17/21 1358   Subjective   Subjective I just don't feel like doing anything . MY left side is hurting me now.    Physical Therapy    Electronically signed by Yari Geiger PTA on 8/17/2021 at 2:01 PM

## 2021-08-17 NOTE — CARE COORDINATION
CM Initial Assessment   Met with pt to assess dc plans/needs. The following information has been reviewed and confirmed: Address:  [facility] [address] [place]    Phone number:   [phone] (home)       Pt reports that she lives at home alone at the [facility]. Stated that she has a walker to use at home. She plans to [place]. Inquired about SNF, pt refusing to go to rehab at this time. Stated that she has family that checks on her, and \"80 year olds do not get their strength back\". Voiced that she has \"had a good life\" and wishes to be at home. Explained to pt that PT suggests that she get rehab prior to return home, she is still refusing. She is agreeable to home health at [place]. She reports her medications as affordable and plans for her dtr to transport her home at [place]. She denies any further needs at this time.    Electronically signed by Berry Forbes on 8/17/2021 at 3:27 PM

## 2021-08-18 ENCOUNTER — APPOINTMENT (OUTPATIENT)
Dept: CT IMAGING | Age: 86
DRG: 445 | End: 2021-08-18
Payer: MEDICARE

## 2021-08-18 LAB
ALBUMIN SERPL-MCNC: 3.5 G/DL (ref 3.5–5.2)
ALP BLD-CCNC: 78 U/L (ref 35–104)
ALT SERPL-CCNC: 62 U/L (ref 5–33)
ANION GAP SERPL CALCULATED.3IONS-SCNC: 11 MMOL/L (ref 7–19)
AST SERPL-CCNC: 88 U/L (ref 5–32)
BASOPHILS ABSOLUTE: 0 K/UL (ref 0–0.2)
BASOPHILS RELATIVE PERCENT: 0.3 % (ref 0–1)
BILIRUB SERPL-MCNC: 0.9 MG/DL (ref 0.2–1.2)
BUN BLDV-MCNC: 12 MG/DL (ref 8–23)
CALCIUM SERPL-MCNC: 9.1 MG/DL (ref 8.8–10.2)
CHLORIDE BLD-SCNC: 108 MMOL/L (ref 98–111)
CO2: 23 MMOL/L (ref 22–29)
CREAT SERPL-MCNC: 1.2 MG/DL (ref 0.5–0.9)
EOSINOPHILS ABSOLUTE: 0.4 K/UL (ref 0–0.6)
EOSINOPHILS RELATIVE PERCENT: 5.6 % (ref 0–5)
GFR AFRICAN AMERICAN: 51
GFR NON-AFRICAN AMERICAN: 42
GLUCOSE BLD-MCNC: 82 MG/DL (ref 70–99)
GLUCOSE BLD-MCNC: 83 MG/DL (ref 74–109)
HCT VFR BLD CALC: 39.5 % (ref 37–47)
HEMOGLOBIN: 12.7 G/DL (ref 12–16)
IMMATURE GRANULOCYTES #: 0 K/UL
LYMPHOCYTES ABSOLUTE: 1.7 K/UL (ref 1.1–4.5)
LYMPHOCYTES RELATIVE PERCENT: 25.8 % (ref 20–40)
MCH RBC QN AUTO: 31.1 PG (ref 27–31)
MCHC RBC AUTO-ENTMCNC: 32.2 G/DL (ref 33–37)
MCV RBC AUTO: 96.6 FL (ref 81–99)
MONOCYTES ABSOLUTE: 0.7 K/UL (ref 0–0.9)
MONOCYTES RELATIVE PERCENT: 11.3 % (ref 0–10)
NEUTROPHILS ABSOLUTE: 3.7 K/UL (ref 1.5–7.5)
NEUTROPHILS RELATIVE PERCENT: 56.7 % (ref 50–65)
PDW BLD-RTO: 12.5 % (ref 11.5–14.5)
PERFORMED ON: NORMAL
PLATELET # BLD: 134 K/UL (ref 130–400)
PMV BLD AUTO: 10.7 FL (ref 9.4–12.3)
POTASSIUM REFLEX MAGNESIUM: 4.2 MMOL/L (ref 3.5–5)
RBC # BLD: 4.09 M/UL (ref 4.2–5.4)
SODIUM BLD-SCNC: 142 MMOL/L (ref 136–145)
TOTAL PROTEIN: 6.4 G/DL (ref 6.6–8.7)
WBC # BLD: 6.6 K/UL (ref 4.8–10.8)

## 2021-08-18 PROCEDURE — 97530 THERAPEUTIC ACTIVITIES: CPT

## 2021-08-18 PROCEDURE — 47490 INCISION OF GALLBLADDER: CPT

## 2021-08-18 PROCEDURE — 82947 ASSAY GLUCOSE BLOOD QUANT: CPT

## 2021-08-18 PROCEDURE — 80053 COMPREHEN METABOLIC PANEL: CPT

## 2021-08-18 PROCEDURE — 85025 COMPLETE CBC W/AUTO DIFF WBC: CPT

## 2021-08-18 PROCEDURE — 2580000003 HC RX 258: Performed by: NURSE PRACTITIONER

## 2021-08-18 PROCEDURE — 6370000000 HC RX 637 (ALT 250 FOR IP): Performed by: NURSE PRACTITIONER

## 2021-08-18 PROCEDURE — 2580000003 HC RX 258: Performed by: HOSPITALIST

## 2021-08-18 PROCEDURE — 36415 COLL VENOUS BLD VENIPUNCTURE: CPT

## 2021-08-18 PROCEDURE — 0FJ43ZZ INSPECTION OF GALLBLADDER, PERCUTANEOUS APPROACH: ICD-10-PCS | Performed by: RADIOLOGY

## 2021-08-18 PROCEDURE — 1210000000 HC MED SURG R&B

## 2021-08-18 PROCEDURE — 99231 SBSQ HOSP IP/OBS SF/LOW 25: CPT | Performed by: SURGERY

## 2021-08-18 RX ORDER — MIDAZOLAM HYDROCHLORIDE 1 MG/ML
INJECTION INTRAMUSCULAR; INTRAVENOUS
Status: DISPENSED
Start: 2021-08-18 | End: 2021-08-18

## 2021-08-18 RX ORDER — SODIUM CHLORIDE 9 MG/ML
INJECTION, SOLUTION INTRAVENOUS CONTINUOUS
Status: DISCONTINUED | OUTPATIENT
Start: 2021-08-18 | End: 2021-08-19 | Stop reason: HOSPADM

## 2021-08-18 RX ORDER — FENTANYL CITRATE 50 UG/ML
INJECTION, SOLUTION INTRAMUSCULAR; INTRAVENOUS
Status: DISPENSED
Start: 2021-08-18 | End: 2021-08-18

## 2021-08-18 RX ORDER — CIPROFLOXACIN 500 MG/1
500 TABLET, FILM COATED ORAL DAILY
Status: DISCONTINUED | OUTPATIENT
Start: 2021-08-19 | End: 2021-08-19

## 2021-08-18 RX ADMIN — METRONIDAZOLE 500 MG: 500 TABLET ORAL at 05:25

## 2021-08-18 RX ADMIN — SODIUM CHLORIDE: 9 INJECTION, SOLUTION INTRAVENOUS at 19:10

## 2021-08-18 RX ADMIN — METRONIDAZOLE 500 MG: 500 TABLET ORAL at 12:52

## 2021-08-18 RX ADMIN — METRONIDAZOLE 500 MG: 500 TABLET ORAL at 21:27

## 2021-08-18 RX ADMIN — ATORVASTATIN CALCIUM 20 MG: 20 TABLET, FILM COATED ORAL at 12:52

## 2021-08-18 RX ADMIN — SODIUM CHLORIDE, PRESERVATIVE FREE 10 ML: 5 INJECTION INTRAVENOUS at 10:00

## 2021-08-18 RX ADMIN — CIPROFLOXACIN 500 MG: 500 TABLET, FILM COATED ORAL at 12:52

## 2021-08-18 ASSESSMENT — PAIN DESCRIPTION - ONSET: ONSET: ON-GOING

## 2021-08-18 ASSESSMENT — PAIN SCALES - GENERAL
PAINLEVEL_OUTOF10: 6
PAINLEVEL_OUTOF10: 0

## 2021-08-18 ASSESSMENT — PAIN DESCRIPTION - LOCATION: LOCATION: ABDOMEN

## 2021-08-18 ASSESSMENT — PAIN DESCRIPTION - PROGRESSION: CLINICAL_PROGRESSION: NOT CHANGED

## 2021-08-18 ASSESSMENT — PAIN DESCRIPTION - PAIN TYPE: TYPE: ACUTE PAIN

## 2021-08-18 NOTE — PLAN OF CARE
Problem: Skin Integrity:  Goal: Will show no infection signs and symptoms  Description: Will show no infection signs and symptoms  Outcome: Ongoing  Goal: Absence of new skin breakdown  Description: Absence of new skin breakdown  Outcome: Ongoing     Problem: Activity:  Goal: Risk for activity intolerance will decrease  Description: Risk for activity intolerance will decrease  Outcome: Ongoing  Goal: Ability to tolerate increased activity will improve  Description: Ability to tolerate increased activity will improve  Outcome: Ongoing     Problem:  Bowel/Gastric:  Goal: Bowel function will improve  Description: Bowel function will improve  Outcome: Ongoing  Goal: Diagnostic test results will improve  Description: Diagnostic test results will improve  Outcome: Ongoing  Goal: Occurrences of nausea will decrease  Description: Occurrences of nausea will decrease  Outcome: Ongoing  Goal: Occurrences of vomiting will decrease  Description: Occurrences of vomiting will decrease  Outcome: Ongoing     Problem: Fluid Volume:  Goal: Maintenance of adequate hydration will improve  Description: Maintenance of adequate hydration will improve  Outcome: Ongoing     Problem: Health Behavior:  Goal: Ability to state signs and symptoms to report to health care provider will improve  Description: Ability to state signs and symptoms to report to health care provider will improve  Outcome: Ongoing  Goal: Identification of resources available to assist in meeting health care needs will improve  Description: Identification of resources available to assist in meeting health care needs will improve  Outcome: Ongoing     Problem: Physical Regulation:  Goal: Complications related to the disease process, condition or treatment will be avoided or minimized  Description: Complications related to the disease process, condition or treatment will be avoided or minimized  Outcome: Ongoing  Goal: Ability to maintain clinical measurements within normal limits will improve  Description: Ability to maintain clinical measurements within normal limits will improve  Outcome: Ongoing     Problem: Sensory:  Goal: Ability to identify factors that increase the pain will improve  Description: Ability to identify factors that increase the pain will improve  Outcome: Ongoing  Goal: Ability to notify healthcare provider of pain before it becomes unmanageable or unbearable will improve  Description: Ability to notify healthcare provider of pain before it becomes unmanageable or unbearable will improve  Outcome: Ongoing  Goal: Pain level will decrease  Description: Pain level will decrease  Outcome: Ongoing     Problem: Falls - Risk of:  Goal: Will remain free from falls  Description: Will remain free from falls  Outcome: Ongoing  Goal: Absence of physical injury  Description: Absence of physical injury  Outcome: Ongoing     Problem: Infection:  Goal: Will remain free from infection  Description: Will remain free from infection  Outcome: Ongoing     Problem: Safety:  Goal: Free from accidental physical injury  Description: Free from accidental physical injury  Outcome: Ongoing  Goal: Free from intentional harm  Description: Free from intentional harm  Outcome: Ongoing     Problem: Daily Care:  Goal: Daily care needs are met  Description: Daily care needs are met  Outcome: Ongoing     Problem: Pain:  Description: Pain management should include both nonpharmacologic and pharmacologic interventions.   Goal: Pain level will decrease  Description: Pain level will decrease  Outcome: Ongoing  Goal: Patient's pain/discomfort is manageable  Description: Patient's pain/discomfort is manageable  Outcome: Ongoing  Goal: Control of acute pain  Description: Control of acute pain  Outcome: Ongoing  Goal: Control of chronic pain  Description: Control of chronic pain  Outcome: Ongoing     Problem: Skin Integrity:  Goal: Skin integrity will stabilize  Description: Skin integrity will

## 2021-08-18 NOTE — PROGRESS NOTES
Pharmacy Renal Adjustment    Juan Carlos Camacho is a 80 y.o. female. Pharmacy has renally adjusted medications per protocol. Recent Labs     08/17/21  0148 08/18/21  0217   BUN 8 12       Recent Labs     08/17/21  0148 08/18/21  0217   CREATININE 1.0* 1.2*       Estimated Creatinine Clearance: 26 mL/min (A) (based on SCr of 1.2 mg/dL (H)). Height:   Ht Readings from Last 1 Encounters:   08/18/21 5' 3\" (1.6 m)     Weight:  Wt Readings from Last 1 Encounters:   08/18/21 116 lb (52.6 kg)       Plan: Adjust the following medications based on renal function:  Change Cipro to 500mg by mouth daily.     GUY WERNER, PHARM D, 8/18/2021, 1:46 PM

## 2021-08-18 NOTE — PROGRESS NOTES
Physical Therapy    Name: Souleymane Mak  MRN: [id number]  Date of Service:  8/18/2021 08/18/21 0215   Restrictions/Precautions   Restrictions/Precautions Fall Risk   Required Braces or Orthoses? No   General   Chart Reviewed Yes   Patient assessed for rehabilitation services? Yes   Response To Previous Treatment Not applicable   Family / Caregiver Present No   Referring Practitioner Baljeet CHERELLE Richard CNP   Referral Date  08/12/21   Diagnosis syncope and collapse, acute cholecystitis, elevated troponin   Follows Commands WFL   Subjective   Subjective Pt laying in bed. Agrees to participate in therapy, says she wants to stretch her legs some. Pain Screening   Patient Currently in Pain Yes   Pain Assessment   Pain Assessment Faces   Pain Level 6   Pain Type Acute pain   Pain Location Abdomen   Pain Descriptors Sharp; Tender; Other (Comment)  (Pt has difficulty explaining, grabs at abdomen upon standing)   Non-Pharmaceutical Pain Intervention(s) Ambulation/Increased Activity; Distraction; Rest   Response to Pain Intervention Patient Satisfied   Pain Onset On-going   Clinical Progression Not changed   Pre Treatment Pain Screening   Intervention List Patient unable to be redirected to participate in therapy   Oxygen Therapy   O2 Device None (Room air)   Bed Mobility   Rolling Supervision   Supine to Sit Stand by assistance   Sit to Supine Stand by assistance   Scooting Stand by assistance   Comment Pt requires extended time for all bed mobility. Upon laying down, pt scoots herself up minimally in bed. Does not want any assistance or the bed flattened, she stated she would scoot herself up all the way later. Transfers   Sit to Stand Stand by assistance   Stand to sit Stand by assistance   Comment Pt requires extended time. Upon standing pt grabs at her abdomen and states she no longer wants to amb. She says she should not have eaten so much lunch, and lays back down.     Patient Goals    Patient goals  get stonger Short term goals   Time Frame for Short term goals 2 wks   Short term goal 1 supine to sit indep   Short term goal 2 sit to stand indep   Short term goal 3 amb. 100' with RW SBA   Short term goal 4 bed to chair SBA   Conditions Requiring Skilled Therapeutic Intervention   Body structures, Functions, Activity limitations Decreased functional mobility ; Decreased strength;Decreased endurance;Decreased posture; Increased pain;Decreased balance;Decreased coordination   Assessment Pt states she is planning to be d/c home tomorrow. She states she is divya to be moving at all at 80 and when she gets home, she plans on mostly staying in he bed. Pt would greatly benefit from further therapy for education, strengthening, balance and gait training, and improving other aspects of functional mobiliy. Plan to progress pt per POC. Treatment Diagnosis debility   Prognosis Fair   Barriers to Learning none noted   REQUIRES PT FOLLOW UP Yes   Treatment Initiated  transfers, bed mobility   Discharge Recommendations Continue to assess pending progress;24 hour supervision or assist;Patient would benefit from continued therapy after discharge   Activity Tolerance   Activity Tolerance Patient limited by pain; Patient limited by endurance; Patient limited by fatigue   Plan   Times per week 3-7   Times per day Daily   Plan weeks 2   Current Treatment Recommendations Strengthening;ROM;Balance Training;Functional Mobility Training;Transfer Training; Endurance Training;Gait Training; Safety Education & Training;Positioning;Patient/Caregiver Education & Training   Plan Comment cont. PT per POC. Safety Devices   Type of devices Patient at risk for falls;Gait belt;Left in bed; Chair alarm in place;Call light within reach   PT Whiteboard Notes   Therapy Whiteboard RE 8/29 syncope and collapse, acute cholecystitis, elevated troponin            Electronically signed by Gus Love PTA on 8/18/2021 at 3:01 PM

## 2021-08-18 NOTE — PROGRESS NOTES
Physical Therapy    Name: Kristin Arenas  MRN: [id number]  Date of Service:  8/18/2021 08/18/21 0145   Subjective   Subjective Attempt: Pt laying in bed eating lunch, family member also present. Pt declines participation in therapy at this time.           Electronically signed by Essence More PTA on 8/18/2021 at 2:41 PM

## 2021-08-18 NOTE — PROGRESS NOTES
Progress Note  Date:2021       Room:0312/312-01  Patient Name:Janiya Oliver     Date of Birth:[birth year]     Age:89 y.o. Subjective    Subjective: 49-year-old lady with a history of type 2 diabetes, prior CVA x2, GERD, presented to the hospital after a fall at home.  Further work-up in the ED also revealed patient been having abdominal pain and was noted to have acute cholecystitis.  Patient denied any chest pain but stated she felt some dizziness and lightheadedness prior to the fall.  Noted to have troponin of 0.08 on admission which is downtrending currently 0.06, discussed with daughter in terms of plan moving forward. Jairo Jamison patient's age, and medical comorbidities patient is at a high risk for surgery.  Discussed with general surgeon and at this time recommending conservative management with continuation of antibiotics for cholecystitis, monitor patient's clinical status . Patient's echocardiogram compared to 2018 showed decreased LV ejection fraction now to 40% and moderately aortic regurgitations.  HIDA shows nonfilling of the gallbladder concerning for mechanical cystic duct obstruction. Patient and family decided to proceed with cholecystostomy tube placement. Plans for placement today. Patient was seen this morning in her room, she wasn't too happy that tube hasn't been placed because she is very hungry and wants to eat. Stated she has not taken any coffee today and last night didn't eat well. no acute overnight event, Denied any nausea or emesis. Low grade fever yesterday. Review of Systems: 12 point system review negative suggested above.       Objective         Vitals Last 24 Hours:  TEMPERATURE:  Temp  Av.2 °F (36.8 °C)  Min: 97.4 °F (36.3 °C)  Max: 99.4 °F (37.4 °C)  RESPIRATIONS RANGE: Resp  Av  Min: 16  Max: 16  PULSE OXIMETRY RANGE: SpO2  Av.7 %  Min: 99 %  Max: 100 %  PULSE RANGE: Pulse  Av.3  Min: 61  Max: 62  BLOOD PRESSURE RANGE: Systolic (45AXG), Av , Min:141 , BVD:281   ; Diastolic (99ZVG), GCY:38, Min:66, Max:74    I/O (24Hr): Intake/Output Summary (Last 24 hours) at 2021 1218  Last data filed at 2021 0105  Gross per 24 hour   Intake 250 ml   Output 1475 ml   Net -1225 ml         Physical Examination:  General: Well-developed, no acute distress lying comfortably in bed. HEENT: Atraumatic normocephalic, range of motion normal JVD, no tracheal deviation noted. Cardiac: Normal S1-S2 no murmurs rub or gallop. Respiratory: clear To auscultation bilaterally, no rhonchi or rales, no wheezing  Abdomen: Soft, positive bowel sounds in all quadrants, no distention, + tenderness to RUQ, no organomegaly noted, no rebound noted. Extremities: no tenderness, no edema, moves all extremities  Psych: Affect normal and good eye contact, behavioral normal.        Labs/Imaging/Diagnostics    Labs:  CBC:  Recent Labs     21   WBC 6.2 5.2 6.6   RBC 4.23 4.19* 4.09*   HGB 13.1 12.7 12.7   HCT 38.3 38.3 39.5   MCV 90.5 91.4 96.6   RDW 11.9 11.9 12.5    128* 134     CHEMISTRIES:  Recent Labs     21    143 142   K 3.4* 4.5 4.2    109 108   CO2 24 24 23   BUN 9 8 12   CREATININE 0.8 1.0* 1.2*   GLUCOSE 119* 86 83   MG 1.9  --   --      PT/INR:  Recent Labs     21   PROTIME 15.7*   INR 1.24*     APTT:  Recent Labs     21   APTT 33.8     LIVER PROFILE:  Recent Labs     21   AST 39* 171* 88*   ALT 10 86* 62*   BILITOT 1.2 1.4* 0.9   ALKPHOS 76 82 78       Imaging Last 24 Hours:  NM HEPATOBILIARY    Result Date: 2021  NM HEPATOBILIARY 2021 2:02 PM HISTORY: Abnormal CT and ultrasound, concern for acute cholecystitis Comparison: CT scan dated 2021 Radiopharmaceutical: 5 mCi technetium 99m Mebrofenin IV.  Technique: Anterior images were acquired over the upper abdomen for a period of 60 minutes following intravenous administration of the radiopharmaceutical. Findings:  Symmetric liver parenchymal uptake. There is progression of the isotope from the liver into the intrahepatic biliary ducts and common bile duct, with the common bile duct well visible by the 20th minute. Progressive accumulation of radiotracer within the common duct with accumulation in the duodenum. There is a small focus of contrast pooling in the region of the gallbladder fossa around the 45-50 minute time points, however, on the subsequent imaging this is revealed to be pooling in the small bowel. Impression: 1. HIDA scan shows non-filling of the gallbladder, concerning for mechanical cystic duct obstruction. Signed by Dr Edwin Copeland    Assessment//Plan           Hospital Problems         Last Modified POA    * (Principal) Acute cholecystitis 8/12/2021 Yes    Essential hypertension 8/12/2021 Yes    Type 2 diabetes mellitus with circulatory disorder ([address] (Chronic) 8/12/2021 Yes    History of stroke 8/12/2021 Yes    Syncope 8/12/2021 Yes    Fall at home 8/12/2021 Yes               R sided weakness following generalized weakness, POA - now at 36 hrs, CT (-) but MRI shows subacute L basal ganglia stroke without hemorrhage. TTE (-)             Assessment & Plan      Acute cholecystitis  Initially on broad-spectrum antibiotics Zosyn and transition to Cipro and Flagyl. Patient tolerating oral antibiotics well with improvement in clinical status. Evaluated by general surgery recommend no surgical intervention at this time given patient's advanced age and medical comorbidities. HIDA scan obtained today which shows nonfilling of the gallbladder plans for cholecystostomy tube placement today    Hyperglycemia: resolved  Patient carries prior history of type 2 diabetes however A1c noted to be 5.3. Monitor blood sugars in-house  Hypoglycemia protocol.     Fall at home prior to admission secondary to syncope  Echo with reduced LV function EF of 40% compared to prior echocardiogram.  Moderate global hypokinesis with grade 1 diastolic dysfunction and moderate aortic regurgitation. No complaints of chest pain or EKG acute changes in house. History of stroke x2  Currently stable. Hypertension: Initiated on nifedipine. Code: Full  DVT prophylaxis: Enoxaparin  Diet. puree  Disposition: likely home tomorrow      Electronically signed by   Megan Mckeon MD   Internal Medicine Hospitalist  On 8/18/2021  At 12:18 PM    EMR Dragon/Transcription disclaimer:   Much of this encounter note is an electronic transcription/translation of spoken language to printed text.  The electronic translation of spoken language may permit erroneous, or at times, nonsensical words or phrases to be inadvertently transcribed; although attempts have made to review the note for such errors, some may still exist.

## 2021-08-18 NOTE — PROGRESS NOTES
[facility] General Surgery Progress Note    Chief Complaint:   Chief Complaint   Patient presents with   Emily Carnes     arrived via EMS from home with a fall, pt reports she fell in hallway hitting back of head       SUBJECTIVE:  Ms. Shaun Christianson is a 80 y.o. female is seen and examined at bedside. Still with only minimal abdominal pain when eating. Waiting cholecystostomy. She denies fever, chills, chest pain, SOB, n/v/d/c.      OBJECTIVE:  BP (!) 141/68   Pulse 61   Temp 97.4 °F (36.3 °C) (Temporal)   Resp 16   Ht 5' 3\" (1.6 m)   Wt 116 lb (52.6 kg)   SpO2 100%   BMI 20.55 kg/m²   CONSTITUTIONAL: Alert, appropriate, no acute distress  SKIN: warm, dry with no rashes or lesions  HEENT: NCAT, Non icteric, PERR. Trachea Midline. CHEST/LUNGS: CTA bilaterally. Normal respiratory effort. CARDIOVASCULAR: RRR, No edema. ABDOMEN: Abdomen soft without tenderness to palpation. NEUROLOGIC: CN II-XI grossly intact, no motor or sensory deficits   MUSCULOSKELETAL: No clubbing or cyanosis. PSYCHIATRIC:  Normal Mood and Affect. Alert and Oriented. Labs:  CBC:   Recent Labs     08/16/21  0204 08/17/21  0148 08/18/21  0217   WBC 6.2 5.2 6.6   HGB 13.1 12.7 12.7    128* 134     BMP:    Recent Labs     08/16/21  0204 08/17/21  0148 08/18/21  0217    143 142   K 3.4* 4.5 4.2    109 108   CO2 24 24 23   BUN 9 8 12   CREATININE 0.8 1.0* 1.2*   GLUCOSE 119* 86 83       ASSESSMENT:  Principal Problem:    Acute cholecystitis  Active Problems:    Essential hypertension    Type 2 diabetes mellitus with circulatory disorder (HCC)    History of stroke    Syncope    Fall at home  Resolved Problems:    * No resolved hospital problems. *      PLAN:  Continue conservative management  Would recommend nonoperative management if the patient tolerates as she is very high risk for surgical intervention due to CVA history and current CV status  Plan for cholecystostomy tube:  This was d/w pt and daughter yesterday          Benji Aceves Charity Medrano DO   Electronically Signed on 8/18/2021 at 8:02 AM

## 2021-08-19 VITALS
SYSTOLIC BLOOD PRESSURE: 126 MMHG | RESPIRATION RATE: 16 BRPM | HEART RATE: 107 BPM | HEIGHT: 63 IN | BODY MASS INDEX: 20.07 KG/M2 | WEIGHT: 113.25 LBS | TEMPERATURE: 98 F | OXYGEN SATURATION: 97 % | DIASTOLIC BLOOD PRESSURE: 58 MMHG

## 2021-08-19 LAB
ALBUMIN SERPL-MCNC: 3.2 G/DL (ref 3.5–5.2)
ALP BLD-CCNC: 134 U/L (ref 35–104)
ALT SERPL-CCNC: 85 U/L (ref 5–33)
ANION GAP SERPL CALCULATED.3IONS-SCNC: 9 MMOL/L (ref 7–19)
AST SERPL-CCNC: 283 U/L (ref 5–32)
BASOPHILS ABSOLUTE: 0 K/UL (ref 0–0.2)
BASOPHILS RELATIVE PERCENT: 0.5 % (ref 0–1)
BILIRUB SERPL-MCNC: 0.7 MG/DL (ref 0.2–1.2)
BUN BLDV-MCNC: 16 MG/DL (ref 8–23)
CALCIUM SERPL-MCNC: 8.6 MG/DL (ref 8.8–10.2)
CHLORIDE BLD-SCNC: 109 MMOL/L (ref 98–111)
CO2: 22 MMOL/L (ref 22–29)
CREAT SERPL-MCNC: 1 MG/DL (ref 0.5–0.9)
EOSINOPHILS ABSOLUTE: 0.3 K/UL (ref 0–0.6)
EOSINOPHILS RELATIVE PERCENT: 5.6 % (ref 0–5)
GFR AFRICAN AMERICAN: >59
GFR NON-AFRICAN AMERICAN: 52
GLUCOSE BLD-MCNC: 93 MG/DL (ref 74–109)
HCT VFR BLD CALC: 34.7 % (ref 37–47)
HEMOGLOBIN: 11.4 G/DL (ref 12–16)
IMMATURE GRANULOCYTES #: 0 K/UL
LYMPHOCYTES ABSOLUTE: 1.9 K/UL (ref 1.1–4.5)
LYMPHOCYTES RELATIVE PERCENT: 34.9 % (ref 20–40)
MCH RBC QN AUTO: 31.1 PG (ref 27–31)
MCHC RBC AUTO-ENTMCNC: 32.9 G/DL (ref 33–37)
MCV RBC AUTO: 94.8 FL (ref 81–99)
MONOCYTES ABSOLUTE: 0.7 K/UL (ref 0–0.9)
MONOCYTES RELATIVE PERCENT: 12 % (ref 0–10)
NEUTROPHILS ABSOLUTE: 2.6 K/UL (ref 1.5–7.5)
NEUTROPHILS RELATIVE PERCENT: 46.6 % (ref 50–65)
PDW BLD-RTO: 12.2 % (ref 11.5–14.5)
PLATELET # BLD: 132 K/UL (ref 130–400)
PMV BLD AUTO: 10.8 FL (ref 9.4–12.3)
POTASSIUM REFLEX MAGNESIUM: 3.9 MMOL/L (ref 3.5–5)
RBC # BLD: 3.66 M/UL (ref 4.2–5.4)
SODIUM BLD-SCNC: 140 MMOL/L (ref 136–145)
TOTAL PROTEIN: 5.9 G/DL (ref 6.6–8.7)
WBC # BLD: 5.5 K/UL (ref 4.8–10.8)

## 2021-08-19 PROCEDURE — 6370000000 HC RX 637 (ALT 250 FOR IP): Performed by: NURSE PRACTITIONER

## 2021-08-19 PROCEDURE — 97530 THERAPEUTIC ACTIVITIES: CPT

## 2021-08-19 PROCEDURE — 36415 COLL VENOUS BLD VENIPUNCTURE: CPT

## 2021-08-19 PROCEDURE — 80053 COMPREHEN METABOLIC PANEL: CPT

## 2021-08-19 PROCEDURE — 97116 GAIT TRAINING THERAPY: CPT

## 2021-08-19 PROCEDURE — 85025 COMPLETE CBC W/AUTO DIFF WBC: CPT

## 2021-08-19 PROCEDURE — 2580000003 HC RX 258: Performed by: NURSE PRACTITIONER

## 2021-08-19 PROCEDURE — 2580000003 HC RX 258: Performed by: HOSPITALIST

## 2021-08-19 RX ORDER — CIPROFLOXACIN 500 MG/1
500 TABLET, FILM COATED ORAL EVERY 12 HOURS SCHEDULED
Status: DISCONTINUED | OUTPATIENT
Start: 2021-08-19 | End: 2021-08-19 | Stop reason: HOSPADM

## 2021-08-19 RX ORDER — CIPROFLOXACIN 500 MG/1
500 TABLET, FILM COATED ORAL EVERY 12 HOURS SCHEDULED
Qty: 14 TABLET | Refills: 0 | Status: SHIPPED | OUTPATIENT
Start: 2021-08-19 | End: 2021-08-26

## 2021-08-19 RX ORDER — METRONIDAZOLE 500 MG/1
500 TABLET ORAL EVERY 8 HOURS SCHEDULED
Qty: 21 TABLET | Refills: 0 | Status: SHIPPED | OUTPATIENT
Start: 2021-08-19 | End: 2021-08-26

## 2021-08-19 RX ADMIN — METRONIDAZOLE 500 MG: 500 TABLET ORAL at 05:20

## 2021-08-19 RX ADMIN — SODIUM CHLORIDE, PRESERVATIVE FREE 10 ML: 5 INJECTION INTRAVENOUS at 08:51

## 2021-08-19 RX ADMIN — CIPROFLOXACIN 500 MG: 500 TABLET, FILM COATED ORAL at 08:50

## 2021-08-19 RX ADMIN — SODIUM CHLORIDE: 9 INJECTION, SOLUTION INTRAVENOUS at 05:20

## 2021-08-19 RX ADMIN — ATORVASTATIN CALCIUM 20 MG: 20 TABLET, FILM COATED ORAL at 08:50

## 2021-08-19 NOTE — PLAN OF CARE
Problem: Skin Integrity:  Goal: Will show no infection signs and symptoms  Description: Will show no infection signs and symptoms  Outcome: Ongoing  Goal: Absence of new skin breakdown  Description: Absence of new skin breakdown  Outcome: Ongoing     Problem: Activity:  Goal: Risk for activity intolerance will decrease  Description: Risk for activity intolerance will decrease  Outcome: Ongoing  Goal: Ability to tolerate increased activity will improve  Description: Ability to tolerate increased activity will improve  Outcome: Ongoing     Problem:  Bowel/Gastric:  Goal: Bowel function will improve  Description: Bowel function will improve  Outcome: Ongoing  Goal: Diagnostic test results will improve  Description: Diagnostic test results will improve  Outcome: Ongoing  Goal: Occurrences of nausea will decrease  Description: Occurrences of nausea will decrease  Outcome: Ongoing  Goal: Occurrences of vomiting will decrease  Description: Occurrences of vomiting will decrease  Outcome: Ongoing     Problem: Fluid Volume:  Goal: Maintenance of adequate hydration will improve  Description: Maintenance of adequate hydration will improve  Outcome: Ongoing     Problem: Health Behavior:  Goal: Ability to state signs and symptoms to report to health care provider will improve  Description: Ability to state signs and symptoms to report to health care provider will improve  Outcome: Ongoing  Goal: Identification of resources available to assist in meeting health care needs will improve  Description: Identification of resources available to assist in meeting health care needs will improve  Outcome: Ongoing     Problem: Physical Regulation:  Goal: Complications related to the disease process, condition or treatment will be avoided or minimized  Description: Complications related to the disease process, condition or treatment will be avoided or minimized  Outcome: Ongoing  Goal: Ability to maintain clinical measurements within normal stabilize  Outcome: Ongoing     Problem: Discharge Planning:  Goal: Patients continuum of care needs are met  Description: Patients continuum of care needs are met  Outcome: Ongoing     Problem: Bleeding:  Goal: Will show no signs and symptoms of excessive bleeding  Description: Will show no signs and symptoms of excessive bleeding  Outcome: Ongoing     Problem: Discharge Planning:  Goal: Discharged to appropriate level of care  Description: Discharged to appropriate level of care  Outcome: Ongoing     Problem: Cardiac:  Goal: Complications related to the disease process, condition or treatment will be avoided or minimized  Description: Complications related to the disease process, condition or treatment will be avoided or minimized  Outcome: Ongoing  Goal: Hemodynamic stability will improve  Description: Hemodynamic stability will improve  Outcome: Ongoing  Goal: Cerebral tissue perfusion will improve  Description: Cerebral tissue perfusion will improve  Outcome: Ongoing     Problem: Coping:  Goal: Ability to identify and develop effective coping behavior will improve  Description: Ability to identify and develop effective coping behavior will improve  Outcome: Ongoing     Problem: ABCDS Injury Assessment  Goal: Absence of physical injury  Outcome: Ongoing     Problem: Urinary Elimination:  Goal: Complications related to the disease process, condition or treatment will be avoided or minimized  Description: Complications related to the disease process, condition or treatment will be avoided or minimized  Outcome: Ongoing  Goal: Signs and symptoms of infection will decrease  Description: Signs and symptoms of infection will decrease  Outcome: Ongoing     Problem: Nutritional:  Goal: Ability to identify appropriate dietary choices will improve  Description: Ability to identify appropriate dietary choices will improve  Outcome: Ongoing

## 2021-08-19 NOTE — PROGRESS NOTES
08/19/21 1002   Restrictions/Precautions   Restrictions/Precautions Fall Risk   Required Braces or Orthoses?  No   General   Chart Reviewed Yes   Subjective   Subjective If you move this tray I'll  get up   Pain Screening   Patient Currently in Pain No   Vital Signs   Level of Consciousness Alert (0)   Oxygen Therapy   O2 Device None (Room air)   Bed Mobility   Supine to Sit Minimal assistance   Scooting Stand by assistance   Transfers   Sit to Stand Minimal Assistance   Stand to sit Contact guard assistance   Ambulation 1   Device Rolling Walker   Assistance Minimal assistance   Quality of Gait unsteady   Gait Deviations Slow Franc[place]sca;Decreased step length;Decreased step height   Distance 10'   Comments Patient agreed to sit in recliner while PCA changed bed  then go BTB   Exercises   Heelslides 10   Knee Long Arc Quad 10   Ankle Pumps 10   Activity Tolerance   Activity Tolerance Patient Tolerated treatment well   Safety Devices   Type of devices Left in chair;Call light within reach  (PCA to put patient BTB after bed change)   Physical Therapy    Electronically signed by Anand Solitario PTA on 8/19/2021 at 10:11 AM

## 2021-08-19 NOTE — DISCHARGE SUMMARY
Discharge Summary        Date:8/19/2021        Patient Name:Janiya Oliver     Date of Birth:[birth year]     Age:89 y.o. Admit Date:8/12/2021   Admission Condition:fair   Discharged Condition:fair  Discharge Date: 08/19/21       Discharge Diagnoses   Principal Problem:    Acute cholecystitis  Active Problems:    Essential hypertension    Type 2 diabetes mellitus with circulatory disorder ([address]    History of stroke    Syncope    Fall at home  Resolved Problems:    * No resolved hospital problems. [facility] Stay   Narrative of Hospital Course:     77-year-old lady with a history of type 2 diabetes, prior CVA x2, GERD, presented to the hospital after a fall at home.  Further work-up in the ED also revealed patient been having abdominal pain and was noted to have acute cholecystitis.  Patient denied any chest pain but stated she felt some dizziness and lightheadedness prior to the fall.  Noted to have troponin of 0.08 on admission which is downtrending currently 0.06, discussed with daughter in terms of plan moving forward. Shruti Mcfarlane patient's age, and medical comorbidities patient is at a high risk for surgery.  Discussed with general surgeon recommended conservative management with continuation of antibiotics for cholecystitis. Patient's echocardiogram compared to 2018 showed decreased LV ejection fraction now to 40% and moderately aortic regurgitations.  HIDA shows nonfilling of the gallbladder concerning for mechanical cystic duct obstruction. Patient and family decided to proceed with cholecystostomy tube placement, this was attempted by radiology on 8/18/21 but Percutaneous cholecystostomy tube was not placed due to poor distention of the gallbladder, Pericholecystic fluid appears resolved. General surgery  Was informed of findings, plans to discharge home and follow up outpt in 2 weeks. Patient treated in-house 7 days with antibiotics, discharged home with ciprofloxacin and Flagyl for 7 more days.   Was evaluated by therapy team, noted that patient require more therapy post discharge, patient has declined SNF at this time. Was set up for home health. To follow-up with PCP in 1 week for continuous management of chronic medical problems.       Physical Examination:  General: Well-developed, no acute distress lying comfortably in bed. HEENT: Atraumatic normocephalic, range of motion normal JVD, no tracheal deviation noted. Cardiac: Normal S1-S2 no murmurs rub or gallop. Respiratory: clear To auscultation bilaterally, no rhonchi or rales, no wheezing  Abdomen: Soft, positive bowel sounds in all quadrants, no distention, + tenderness to RUQ, no organomegaly noted, no rebound noted. Extremities: no tenderness, no edema, moves all extremities  Psych: Affect normal and good eye contact, behavioral normal.         Consultants:   IP CONSULT TO GENERAL SURGERY  IP CONSULT TO IV TEAM  IP CONSULT TO HOME CARE NEEDS    Time Spent on Discharge:  40 minutes were spent in patient examination, evaluation, counseling as well as medication reconciliation, prescriptions for required medications, discharge plan and follow up.       Surgeries/Procedures Performed:  NONE      Significant Diagnostic Studies:   Recent Labs:  CBC:   Lab Results   Component Value Date    WBC 5.5 08/19/2021    RBC 3.66 08/19/2021    HGB 11.4 08/19/2021    HCT 34.7 08/19/2021    HCT 40.2 02/22/2011    MCV 94.8 08/19/2021    MCH 31.1 08/19/2021    MCHC 32.9 08/19/2021    RDW 12.2 08/19/2021     08/19/2021     02/22/2011     BMP:    Lab Results   Component Value Date    GLUCOSE 93 08/19/2021     08/19/2021     02/22/2011    K 3.9 08/19/2021    K 4.2 02/22/2011     08/19/2021     02/22/2011    CO2 22 08/19/2021    ANIONGAP 9 08/19/2021    BUN 16 08/19/2021    CREATININE 1.0 08/19/2021    CREATININE 0.8 02/22/2011    CALCIUM 8.6 08/19/2021    LABGLOM 52 08/19/2021    GFRAA >59 08/19/2021       Radiology Last 7 Days:  CT HEAD WO CONTRAST    Result Date: 8/12/2021  Moderate cerebral and cerebellar volume loss with chronic microvascular disease but no evidence of acute intracranial process. Signed by Dr Marie Jensen    CT Cervical Spine WO Contrast    Result Date: 8/12/2021  1. Degenerative changes in the cervical spine without evidence of acute osseous injury. Signed by Dr Francisco Dickerson    Result Date: 8/16/2021  Impression: 1. HIDA scan shows non-filling of the gallbladder, concerning for mechanical cystic duct obstruction. Signed by Dr Edwige Uribe Additional Contrast? None    Result Date: 8/12/2021  1. Small amount of pericholecystic fluid with no definite gallstones, or gallbladder wall thickening. Early cholecystitis cannot be excluded. Follow-up gallbladder ultrasound is recommended. No biliary ductal dilatation. 2. Geographic fatty infiltration of the liver with sparing along the gallbladder fossa and within the lateral aspect of the left lobe of the liver. 3. Fluid and air filled structure adjacent to the distal duodenum possibly a large duodenal diverticulum. 4. Small amount of free fluid in the right pelvis. No evidence of free air. 5. Nonvisualization of the appendix, with no findings to indicate acute appendicitis. Signed by Dr Starr Kirby. [place]    US GALLBLADDER RUQ    Result Date: 8/12/2021  Findings suggesting cholecystitis. There is sludge in the gallbladder with a possible small adherent stone. Signed by Dr Ursula Bangura    Result Date: 8/12/2021  1. No radiographic evidence of acute cardiopulmonary process. Signed by Dr Marie Jensen    CT PERC CHOLECYSTOSTOMY    Result Date: 8/18/2021  Impression: 1. Percutaneous cholecystostomy tube was not placed due to poor distention of the gallbladder. 2. Pericholecystic fluid appears resolved.  Signed by Dr Tyshawn Lakhani      Discharge Plan   Disposition: Home    Provider Follow-Up:   Christian Press, MD  [facility]  [phone]    On 8/23/2021  at 9:30 AM    Lindsay Durand DO  [address]  [phone]             Patient Instructions   Diet: cardiac diet    Activity: activity as tolerated      Discharge Medications         Medication List      START taking these medications    ciprofloxacin 500 MG tablet  Commonly known as: CIPRO  Take 1 tablet by mouth every 12 hours for 7 days     metroNIDAZOLE 500 MG tablet  Commonly known as: FLAGYL  Take 1 tablet by mouth every 8 hours for 7 days        CONTINUE taking these medications    aspirin 81 MG EC tablet     ibuprofen 100 MG/5ML suspension  Commonly known as: ADVIL;MOTRIN        STOP taking these medications    ALEVE PO           Where to Get Your Medications      These medications were sent to The [address] [address]    Phone: [phone]   · ciprofloxacin 500 MG tablet  · metroNIDAZOLE 500 MG tablet         Electronically signed by Shanique Freitas MD on 8/19/21 at 10:18 AM CDT

## 2021-08-19 NOTE — CARE COORDINATION
Spoke with patient regarding MD orders for [place] services. Patient agreeable and has chosen [facility]. Referral Faxed. [address] [place] [address] [phone]. [phone]. Please notify [facility] when patient discharges and fax DC Summary,  DC med list and any new [place] orders. The Patient was provided with a choice of provider and agrees   with the discharge plan. [x] Yes [] No    Freedom of choice list was provided with basic dialogue that supports the patient's individualized plan of care/goals, treatment preferences and shares the quality data associated with the providers.  [x] Yes [] No  Electronically signed by Juan Daniel Grimes on 8/19/2021 at 9:21 AM

## 2021-08-19 NOTE — PROGRESS NOTES
Recent Labs     08/18/21  0217 08/19/21  0209   BUN 12 16       Recent Labs     08/18/21  0217 08/19/21  0209   CREATININE 1.2* 1.0*       Estimated Creatinine Clearance: 30 mL/min (A) (based on SCr of 1 mg/dL (H)). Plan: Will change Cipro back to 500 mg twice daily and continue to monitor renal function.     Electronically signed by Kailee Montes [facility] on 8/19/2021 at 4:34 AM

## 2021-08-20 ENCOUNTER — CARE COORDINATION (OUTPATIENT)
Dept: CASE MANAGEMENT | Age: 86
End: 2021-08-20

## 2021-08-20 NOTE — CARE COORDINATION
[place] [facility] Transitions Initial Follow Up Call    Call within 2 business days of discharge: Yes    Patient: Juan Carlos Camacho Patient : 1930   MRN: [id number]  Reason for Admission:   Discharge Date: 21 RARS: Readmission Risk Score: 12      Last Discharge [facility]       Complaint Diagnosis Description Type Department Provider    21 Fall Acute cholecystitis . .. ED to Hosp-Admission (Discharged) (ADMITTED) L MED SURG Joshua Martinez MD           Spoke with: [facility], . Juan Carlos Camacho daughter with permission from patient for CTN call. Advance Care Planning   Healthcare Decision Maker:    Primary Decision Maker: Jordyn Buerger Child - [phone]    Transitions of Care Initial Call      Challenges to be reviewed by the provider   Additional needs identified to be addressed with provider: No  none             Method of communication with provider : none      Advance Care Planning:   Does patient have an Advance Directive: not on file; education provided. Was this a readmission? No  Patient stated reason for admission: cholecystitis  Patients top risk factors for readmission: functional physical ability, medical condition-cholecystitis and medication management    Care Transition Nurse (CTN) contacted the family by telephone to perform post hospital discharge assessment. Verified name and  with family as identifiers. Provided introduction to self, and explanation of the CTN role. CTN reviewed discharge instructions, medical action plan and red flags with family who verbalized understanding. Family given an opportunity to ask questions and does not have any further questions or concerns at this time. Were discharge instructions available to patient? Yes. Reviewed appropriate site of care based on symptoms and resources available to patient including: PCP. The family agrees to contact the PCP office for questions related to their healthcare.      Medication reconciliation was performed with family, who verbalizes understanding of administration of home medications. Advised obtaining a 90-day supply of all daily and as-needed medications. Covid Risk Education     Educated patient about risk for severe COVID-19 due to risk factors according to CDC guidelines. CTN reviewed discharge instructions, medical action plan and red flag symptoms with the patient who verbalized understanding. Discussed COVID vaccination status: Yes. Education provided on COVID-19 vaccination as appropriate. Discussed exposure protocols and quarantine with CDC Guidelines. Family was given an opportunity to verbalize any questions and concerns and agrees to contact CTN or health care provider for questions related to their healthcare. Reviewed and educated family on any new and changed medications related to discharge diagnosis. Was patient discharged with a pulse oximeter? No Discussed and confirmed pulse oximeter discharge instructions and when to notify provider or seek emergency care. CTN provided contact information. No further follow-up call indicated based on severity of symptoms and risk factors. Care Transitions 24 Hour Call    Do you have any ongoing symptoms?: No  Do you have a copy of your discharge instructions?: Yes  Do you have all of your prescriptions and are they filled?: Yes  Have you been contacted by a [address]?: No  Have you scheduled your follow up appointment?: Yes  How are you going to get to your appointment?: Car - family or friend to transport  Were you discharged with any Home Care or Post Acute Services: Yes  Post Acute Services: [facility] [address] you feel like you have everything you need to keep you well at home?: Yes  Care Transitions Interventions         Follow Up : Spoke with patient's daughter today for CT call after discharge from [facility]. Patient lives in [place] in [address] [facility], declined SNF at discharge for rehab.  [facility] PT just left, says they did go over a LW/AD while there. Patient is currently resting, but did give permission to speak to her daughter. [facility] states patient is eating some, she is drinking. CTN counseled on diet, soft foods, oatmeal, rice, eggs, baked chicken, no fried foods as she has been having gallbladder issues. [facility] says they were counseled on diet inpatient as well. [facility] says patient has had Covid vaccine, lists her as [facility], and they are working on [employer]. She says they a have HFU with PCP and surgeon scheduled and are aware. No current needs at this time. No further outreach indicated at this time.    Future Appointments   Date Time Provider Norma Sotomayor   9/2/2021  1:00  [facility],  [id number]       Vasu Jane RN

## 2021-08-23 ENCOUNTER — HOSPITAL ENCOUNTER (OUTPATIENT)
Dept: GENERAL RADIOLOGY | Facility: HOSPITAL | Age: 86
Discharge: HOME | End: 2021-08-23
Admitting: FAMILY MEDICINE

## 2021-08-23 ENCOUNTER — TRANSCRIBE ORDERS (OUTPATIENT)
Dept: ADMINISTRATIVE | Facility: HOSPITAL | Age: 86
End: 2021-08-23

## 2021-08-23 DIAGNOSIS — R07.9 CHEST PAIN, UNSPECIFIED TYPE: ICD-10-CM

## 2021-08-23 DIAGNOSIS — R07.9 CHEST PAIN, UNSPECIFIED TYPE: Primary | ICD-10-CM

## 2021-08-23 PROCEDURE — 71101 X-RAY EXAM UNILAT RIBS/CHEST: CPT

## 2021-08-27 ENCOUNTER — APPOINTMENT (OUTPATIENT)
Dept: CT IMAGING | Age: 86
End: 2021-08-27
Payer: MEDICARE

## 2021-08-27 ENCOUNTER — HOSPITAL ENCOUNTER (EMERGENCY)
Age: 86
Discharge: HOME | End: 2021-08-27
Attending: EMERGENCY MEDICINE
Payer: MEDICARE

## 2021-08-27 VITALS
WEIGHT: 113 LBS | OXYGEN SATURATION: 92 % | TEMPERATURE: 98.2 F | SYSTOLIC BLOOD PRESSURE: 128 MMHG | HEART RATE: 60 BPM | RESPIRATION RATE: 18 BRPM | HEIGHT: 63 IN | DIASTOLIC BLOOD PRESSURE: 40 MMHG | BODY MASS INDEX: 20.02 KG/M2

## 2021-08-27 DIAGNOSIS — R19.7 DIARRHEA, UNSPECIFIED TYPE: ICD-10-CM

## 2021-08-27 DIAGNOSIS — R10.30 LOWER ABDOMINAL PAIN: Primary | ICD-10-CM

## 2021-08-27 LAB
ABO/RH: NORMAL
ALBUMIN SERPL-MCNC: 3.6 G/DL (ref 3.5–5.2)
ALP BLD-CCNC: 75 U/L (ref 35–104)
ALT SERPL-CCNC: 25 U/L (ref 5–33)
ANION GAP SERPL CALCULATED.3IONS-SCNC: 11 MMOL/L (ref 7–19)
ANTIBODY SCREEN: NORMAL
AST SERPL-CCNC: 31 U/L (ref 5–32)
BASOPHILS ABSOLUTE: 0 K/UL (ref 0–0.2)
BASOPHILS RELATIVE PERCENT: 0.5 % (ref 0–1)
BILIRUB SERPL-MCNC: 0.7 MG/DL (ref 0.2–1.2)
BUN BLDV-MCNC: 14 MG/DL (ref 8–23)
CALCIUM SERPL-MCNC: 9.4 MG/DL (ref 8.8–10.2)
CHLORIDE BLD-SCNC: 111 MMOL/L (ref 98–111)
CO2: 21 MMOL/L (ref 22–29)
CREAT SERPL-MCNC: 0.7 MG/DL (ref 0.5–0.9)
EOSINOPHILS ABSOLUTE: 0.2 K/UL (ref 0–0.6)
EOSINOPHILS RELATIVE PERCENT: 2.7 % (ref 0–5)
GFR AFRICAN AMERICAN: >59
GFR NON-AFRICAN AMERICAN: >60
GLUCOSE BLD-MCNC: 109 MG/DL (ref 74–109)
HCT VFR BLD CALC: 44.6 % (ref 37–47)
HEMOGLOBIN: 13.9 G/DL (ref 12–16)
IMMATURE GRANULOCYTES #: 0 K/UL
LACTIC ACID: 1.1 MMOL/L (ref 0.5–1.9)
LIPASE: 19 U/L (ref 13–60)
LYMPHOCYTES ABSOLUTE: 1.3 K/UL (ref 1.1–4.5)
LYMPHOCYTES RELATIVE PERCENT: 16.7 % (ref 20–40)
MCH RBC QN AUTO: 30.9 PG (ref 27–31)
MCHC RBC AUTO-ENTMCNC: 31.2 G/DL (ref 33–37)
MCV RBC AUTO: 99.1 FL (ref 81–99)
MONOCYTES ABSOLUTE: 0.8 K/UL (ref 0–0.9)
MONOCYTES RELATIVE PERCENT: 10 % (ref 0–10)
NEUTROPHILS ABSOLUTE: 5.4 K/UL (ref 1.5–7.5)
NEUTROPHILS RELATIVE PERCENT: 69.8 % (ref 50–65)
PDW BLD-RTO: 12.8 % (ref 11.5–14.5)
PLATELET # BLD: 281 K/UL (ref 130–400)
PMV BLD AUTO: 10.6 FL (ref 9.4–12.3)
POTASSIUM REFLEX MAGNESIUM: 4 MMOL/L (ref 3.5–5)
RBC # BLD: 4.5 M/UL (ref 4.2–5.4)
REASON FOR REJECTION: NORMAL
REJECTED TEST: NORMAL
SODIUM BLD-SCNC: 143 MMOL/L (ref 136–145)
TOTAL PROTEIN: 7.5 G/DL (ref 6.6–8.7)
WBC # BLD: 7.7 K/UL (ref 4.8–10.8)

## 2021-08-27 PROCEDURE — 36415 COLL VENOUS BLD VENIPUNCTURE: CPT

## 2021-08-27 PROCEDURE — 86901 BLOOD TYPING SEROLOGIC RH(D): CPT

## 2021-08-27 PROCEDURE — 96375 TX/PRO/DX INJ NEW DRUG ADDON: CPT

## 2021-08-27 PROCEDURE — 83690 ASSAY OF LIPASE: CPT

## 2021-08-27 PROCEDURE — 2580000003 HC RX 258: Performed by: EMERGENCY MEDICINE

## 2021-08-27 PROCEDURE — 86850 RBC ANTIBODY SCREEN: CPT

## 2021-08-27 PROCEDURE — 85025 COMPLETE CBC W/AUTO DIFF WBC: CPT

## 2021-08-27 PROCEDURE — 99284 EMERGENCY DEPT VISIT MOD MDM: CPT

## 2021-08-27 PROCEDURE — 96374 THER/PROPH/DIAG INJ IV PUSH: CPT

## 2021-08-27 PROCEDURE — 86900 BLOOD TYPING SEROLOGIC ABO: CPT

## 2021-08-27 PROCEDURE — 74177 CT ABD & PELVIS W/CONTRAST: CPT

## 2021-08-27 PROCEDURE — 83605 ASSAY OF LACTIC ACID: CPT

## 2021-08-27 PROCEDURE — 6360000002 HC RX W HCPCS: Performed by: EMERGENCY MEDICINE

## 2021-08-27 PROCEDURE — 80053 COMPREHEN METABOLIC PANEL: CPT

## 2021-08-27 PROCEDURE — 6360000004 HC RX CONTRAST MEDICATION: Performed by: EMERGENCY MEDICINE

## 2021-08-27 RX ORDER — SODIUM CHLORIDE, SODIUM LACTATE, POTASSIUM CHLORIDE, AND CALCIUM CHLORIDE .6; .31; .03; .02 G/100ML; G/100ML; G/100ML; G/100ML
500 INJECTION, SOLUTION INTRAVENOUS ONCE
Status: COMPLETED | OUTPATIENT
Start: 2021-08-27 | End: 2021-08-27

## 2021-08-27 RX ORDER — ONDANSETRON 2 MG/ML
4 INJECTION INTRAMUSCULAR; INTRAVENOUS ONCE
Status: COMPLETED | OUTPATIENT
Start: 2021-08-27 | End: 2021-08-27

## 2021-08-27 RX ORDER — HYDROMORPHONE HYDROCHLORIDE 1 MG/ML
0.5 INJECTION, SOLUTION INTRAMUSCULAR; INTRAVENOUS; SUBCUTANEOUS ONCE
Status: COMPLETED | OUTPATIENT
Start: 2021-08-27 | End: 2021-08-27

## 2021-08-27 RX ADMIN — HYDROMORPHONE HYDROCHLORIDE 0.5 MG: 1 INJECTION, SOLUTION INTRAMUSCULAR; INTRAVENOUS; SUBCUTANEOUS at 15:05

## 2021-08-27 RX ADMIN — SODIUM CHLORIDE, POTASSIUM CHLORIDE, SODIUM LACTATE AND CALCIUM CHLORIDE 500 ML: 600; 310; 30; 20 INJECTION, SOLUTION INTRAVENOUS at 15:07

## 2021-08-27 RX ADMIN — IOPAMIDOL 90 ML: 755 INJECTION, SOLUTION INTRAVENOUS at 14:43

## 2021-08-27 RX ADMIN — ONDANSETRON HYDROCHLORIDE 4 MG: 2 SOLUTION INTRAMUSCULAR; INTRAVENOUS at 15:06

## 2021-08-27 ASSESSMENT — PAIN SCALES - GENERAL
PAINLEVEL_OUTOF10: 9
PAINLEVEL_OUTOF10: 4

## 2021-08-27 NOTE — ED NOTES
Bed: 14  Expected date:   Expected time:   Means of arrival:   Comments:     Mary Luo RN  08/27/21 7288

## 2021-08-28 NOTE — ED PROVIDER NOTES
[facility] EMERGENCY DEPT  eMERGENCY dEPARTMENT eNCOUnter      Pt Name: Fahad Peacock  MRN: [id number]  [place] 12/14/1930  Date of evaluation: 8/27/2021  Provider: Magi Amos MD    [address] [place] [address]       Chief Complaint   Patient presents with    Abdominal Pain     dark stools started yesterday          HISTORY OF PRESENT ILLNESS   (Location/Symptom, Timing/Onset,Context/Setting, Quality, Duration, Modifying Factors, Severity)  Note limiting factors. Fahad Peacock is a 80 y.o. female who presents to the emergency department for evaluation of abdominal pain with associated episodes of diarrhea. Patient states that she has noticed some dark color to her stools recently. She was just discharged home from the hospital about 1 week previously after an inpatient hospitalization secondary to cholecystitis. She had underwent attempted placement of a percutaneous drain however this was unsuccessful. She was discharged home on oral antibiotics, Cipro Flagyl. States that earlier today she had a couple episodes of dark stools. She is not had any episodes of vomiting. Denies any bright red blood in the stool. She denies fevers or chills. She is not really had any increased abdominal pain. Symptoms are described as mild in severity. She is not currently maintained on any oral anticoagulant medications. HPI    NursingNotes were reviewed. REVIEW OF SYSTEMS    (2-9 systems for level 4, 10 or more for level 5)     Review of Systems   Constitutional: Negative for chills and fever. Respiratory: Negative for shortness of breath. Cardiovascular: Negative for chest pain. Gastrointestinal: Positive for abdominal pain, blood in stool and diarrhea. Neurological: Negative for syncope. All other systems reviewed and are negative.            PAST MEDICALHISTORY     Past Medical History:   Diagnosis Date    Arthritis     Blood circulation, collateral     Bradycardia     of uncertain etiology    Cerebral infarction  Smokeless tobacco: Never Used    Tobacco comment: states 10 or 9   Vaping Use    Vaping Use: Never used   Substance and Sexual Activity    Alcohol use: No    Drug use: No    Sexual activity: Not Currently   Other Topics Concern    Not on file   Social History Narrative    Not on file     Social Determinants of Health     Financial Resource Strain:     Difficulty of Paying Living Expenses:    Food Insecurity:     Worried About Running Out of Food in the Last Year:     Ran Out of Food in the Last Year:    Transportation Needs:     Lack of Transportation (Medical):  Lack of Transportation (Non-Medical):    Physical Activity:     Days of Exercise per Week:     Minutes of Exercise per Session:    Stress:     Feeling of Stress :    Social Connections:     Frequency of Communication with Friends and Family:     Frequency of Social Gatherings with Friends and Family:     Attends Scientologist Services:     Active Member of Clubs or Organizations:     Attends Club or Organization Meetings:     Marital Status:    Intimate Partner Violence:     Fear of Current or Ex-Partner:     Emotionally Abused:     Physically Abused:     Sexually Abused:        SCREENINGS             PHYSICAL EXAM    (up to 7 for level 4, 8 or more for level 5)     ED Triage Vitals [08/27/21 1141]   BP Temp Temp Source Pulse Resp SpO2 Height Weight   (!) 144/71 98.2 °F (36.8 °C) Oral 86 16 96 % 5' 3\" (1.6 m) 113 lb (51.3 kg)       Physical Exam  Vitals and nursing note reviewed. HENT:      Head: Atraumatic. Mouth/Throat:      Mouth: Mucous membranes are not dry. Eyes:      General: No scleral icterus. Pupils: Pupils are equal, round, and reactive to light. Neck:      Trachea: No tracheal deviation. Cardiovascular:      Rate and Rhythm: Normal rate and regular rhythm. Heart sounds: Normal heart sounds. No murmur heard. Pulmonary:      Effort: Pulmonary effort is normal. No respiratory distress. Breath sounds: Normal breath sounds. No stridor. Abdominal:      General: There is no distension. Palpations: Abdomen is soft. Tenderness: There is abdominal tenderness (mild, lower abdomen). There is no guarding. Genitourinary:     Rectum: Guaiac result negative. Skin:     Capillary Refill: Capillary refill takes less than 2 seconds. Coloration: Skin is not pale. Findings: No rash. Neurological:      Mental Status: She is alert and oriented to person, place, and time. Psychiatric:         Behavior: Behavior is cooperative. DIAGNOSTIC RESULTS       RADIOLOGY:  Non-plain film images such as CT, Ultrasound and MRI are read by the radiologist. Plain radiographic images are visualized and preliminarily interpreted bythe emergency physician with the below findings:      CT ABDOMEN PELVIS W IV CONTRAST Additional Contrast? None   Final Result   1. Liquid stool suspected within the colon which may indicate the   presence of diarrhea. No evidence of bowel obstruction. Normal   appendix. No free air or abscess. Duodenal diverticula. No   radiographic evidence of diverticulitis. 2. Decreasing pericholecystic fluid/inflammation compared to recent   prior CT studies. Similar Hyperdense material within the region of the   gallbladder neck without significant biliary dilatation. 3. Atherosclerotic changes with no regional aneurysm   4. Mild chronic basilar interstitial lung change.     Signed by Dr Lauren Phelps:  Labs Reviewed   CBC WITH AUTO DIFFERENTIAL - Abnormal; Notable for the following components:       Result Value    MCV 99.1 (*)     MCHC 31.2 (*)     Neutrophils % 69.8 (*)     Lymphocytes % 16.7 (*)     All other components within normal limits   COMPREHENSIVE METABOLIC PANEL W/ REFLEX TO MG FOR LOW K - Abnormal; Notable for the following components:    CO2 21 (*)     All other components within normal limits   SPECIMEN REJECTION   LACTIC ACID, PLASMA   LIPASE TYPE AND SCREEN CAPTURE THREE SCREEN CELL       All other labs were within normal range or not returned as of this dictation. EMERGENCY DEPARTMENT COURSE and DIFFERENTIAL DIAGNOSIS/MDM:   Vitals:    Vitals:    08/27/21 1141 08/27/21 1615 08/27/21 1839 08/27/21 1938   BP: (!) 144/71 (!) 145/58 (!) 120/42 (!) 128/40   Pulse: 86 60 64 60   Resp: 16 18 18 18   Temp: 98.2 °F (36.8 °C)      TempSrc: Oral      SpO2: 96% 93% 90% 92%   Weight: 113 lb (51.3 kg)      Height: 5' 3\" (1.6 m)          MDM    Reassessment    Patient's hemoglobin is stable. CT scan does not reveal any acute pathology. She will be following up with general surgery as an outpatient. Other vital signs look okay. CONSULTS:  IP CONSULT TO IV TEAM    PROCEDURES:  Unless otherwise noted below, none     Procedures    FINAL IMPRESSION      1. Lower abdominal pain    2.  Diarrhea, unspecified type          DISPOSITION/PLAN   DISPOSITION Decision To Discharge 08/27/2021 07:00:35 PM      PATIENT REFERRED TO:  Luise Ahumada, MD  [facility]  [phone]            DISCHARGE MEDICATIONS:  Discharge Medication List as of 8/27/2021  7:17 PM             (Please note that portions of this note were completed with a voice recognition program.  Efforts were made to edit thedictations but occasionally words are mis-transcribed.)    Debra Mcbride MD (electronically signed)  Attending Emergency Physician         Debra Mcbride MD  09/09/21 [id number]

## 2021-08-28 NOTE — DISCHARGE INSTR - COC
Continuity of Care Form    Patient Name: Fahad Peacock   :  1930  MRN:  [id number]    Admit date:  2021  Discharge date:  ***    Code Status Order: Prior   Advance Directives:     Admitting Physician:  No admitting provider for patient encounter. PCP: Kassandra Sánchez MD    Discharging Nurse: [facility] Unit/Room#:   Discharging Unit Phone Number: ***    Emergency Contact:   Extended Emergency Contact Information  Primary Emergency Contact: Halie Rooney[address] Phone: [phone]  Relation: Child    Past Surgical History:  Past Surgical History:   Procedure Laterality Date    CARDIAC CATHETERIZATION  2003    EF greater 50%  Angiographically normal coronary arteries, Normal left ventricular systolic function, Mild left ventricular systolic function, Mild left ventricular diastolic dysfunction and mild systemic hypertesion.  COLONOSCOPY      CT PERC CHOLECYSTOSTOMY  2021    CT PERC CHOLECYSTOSTOMY 2021 [facility] CT SCAN    EYE SURGERY      HYSTERECTOMY         Immunization History: There is no immunization history for the selected administration types on file for this patient.     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[facility]) E11.59    Tobacco dependence F17.200    Pneumonia J18.9    Cerebrovascular accident (CVA) due to thrombosis of cerebral artery ([facility]) I63.30    Type 2 diabetes mellitus without complication ([facility]) P44.5    Nonrheumatic aortic valve insufficiency I35.1    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 Cristian Cardenas, Y92.009       Isolation/Infection:   Isolation          No Isolation        Patient Infection Status     None to display          Nurse Assessment:  Last Vital Signs: BP (!) 128/40   Pulse 60   Temp 98.2 °F (36.8 °C) (Oral)   Resp 18   Ht 5' 3\" (1.6 m)   Wt 113 lb (51.3 kg)   SpO2 92%   BMI 20.02 kg/m²     Last documented pain score (0-10 scale): Pain Level: 4  Last Weight:   Wt Readings from Last 1 Encounters:   08/27/21 113 lb (51.3 kg)     Mental Status:  {IP PT MENTAL STATUS:20030}    IV Access:  { ZANDER IV ACCESS:332109441}    Nursing Mobility/ADLs:  Walking   {P DME DXAU:580902511}  Transfer  {P DME NQAE:739964030}  Bathing  {P DME NXUL:815294404}  Dressing  {P DME UDFC:585504766}  Toileting  {P DME RULN:313031439}  Feeding  {[facility] DME VQAH:391712170}  Med Admin  {[facility] DME IUBT:197039946}  Med Delivery   {[facility] MED Delivery:270280781}    Wound Care Documentation and Therapy:        Elimination:  Continence:   · Bowel: {YES / RF:51902}  · Bladder: {YES / DZ:22464}  Urinary Catheter: {Urinary Catheter:191459260}   Colostomy/Ileostomy/Ileal Conduit: {YES / ML:61355}       Date of Last BM: ***    Intake/Output Summary (Last 24 hours) at 8/27/2021 1940  Last data filed at 8/27/2021 1616  Gross per 24 hour   Intake 500 ml   Output --   Net 500 ml     No intake/output data recorded.     Safety Concerns:     [address] Safety Concerns:945793768}    Impairments/Disabilities:      [address] FERMIN Impairments/Disabilities:889568860}    Nutrition Therapy:  Current Nutrition Therapy:   [address] FERMIN Diet List:770603042}    Routes of Feeding: {CHP DME Other Feedings:045506654}  Liquids: {Slp liquid thickness:19337}  Daily Fluid Restriction: {CHP DME Yes amt example:443501089}  Last Modified Barium Swallow with Video (Video Swallowing Test): {Done Not Done KMPO:339640260}    Treatments at the Time of Hospital Discharge:   Respiratory Treatments: ***  Oxygen Therapy:  {Therapy; copd oxygen:30651}  Ventilator:    { CC Vent BDYA:853415966}    Rehab Therapies: {THERAPEUTIC INTERVENTION:5973610135}  Weight Bearing Status/Restrictions: { CC Weight Bearin}  Other Medical Equipment (for information only, NOT a DME order):  {EQUIPMENT:539574512}  Other Treatments: ***    Patient's personal belongings (please select all that are sent with patient):  {CHP DME Belongings:604037565}    RN SIGNATURE:  {Esignature:354889552}    CASE MANAGEMENT/SOCIAL WORK SECTION    Inpatient Status Date: ***    Readmission Risk Assessment Score:  Readmission Risk              Risk of Unplanned Readmission:  0           Discharging to Facility/ Agency   · Name:   · Address:  · Phone:  · Fax:    Dialysis Facility (if applicable)   · Name:  · Address:  · Dialysis Schedule:  · Phone:  · Fax:    / signature: {Esignature:884765903}    PHYSICIAN SECTION    Prognosis: {Prognosis:6448100537}    Condition at Discharge: [address] Patient Condition:850375004}    Rehab Potential (if transferring to Rehab): {Prognosis:9392214535}    Recommended Labs or Other Treatments After Discharge: ***    Physician Certification: I certify the above information and transfer of Jeanne Tello  is necessary for the continuing treatment of the diagnosis listed and that she requires {Admit to Appropriate Level of Care:25329} for {GREATER/LESS:892220280} 30 days.      Update Admission H&P: {CHP DME Changes in BRVXA:351221502}    PHYSICIAN SIGNATURE:  {Esignature:190962800}

## 2021-09-02 ENCOUNTER — OFFICE VISIT (OUTPATIENT)
Dept: SURGERY | Age: 86
End: 2021-09-02
Payer: MEDICARE

## 2021-09-02 VITALS
OXYGEN SATURATION: 97 % | TEMPERATURE: 98.7 F | BODY MASS INDEX: 19.63 KG/M2 | HEIGHT: 64 IN | HEART RATE: 87 BPM | WEIGHT: 115 LBS

## 2021-09-02 DIAGNOSIS — E11.9 TYPE 2 DIABETES MELLITUS WITHOUT COMPLICATION, UNSPECIFIED WHETHER LONG TERM INSULIN USE (HCC): ICD-10-CM

## 2021-09-02 DIAGNOSIS — K81.0 ACUTE CHOLECYSTITIS: Primary | ICD-10-CM

## 2021-09-02 DIAGNOSIS — I63.30 CEREBROVASCULAR ACCIDENT (CVA) DUE TO THROMBOSIS OF CEREBRAL ARTERY (HCC): ICD-10-CM

## 2021-09-02 PROCEDURE — 99212 OFFICE O/P EST SF 10 MIN: CPT | Performed by: SURGERY

## 2021-09-02 RX ORDER — TRAMADOL HYDROCHLORIDE 50 MG/1
TABLET ORAL
COMMUNITY
Start: 2021-08-23

## 2021-09-02 RX ORDER — NIFEDIPINE 60 MG/1
TABLET, EXTENDED RELEASE ORAL
COMMUNITY
Start: 2021-08-16

## 2021-09-02 ASSESSMENT — ENCOUNTER SYMPTOMS
DIARRHEA: 0
CONSTIPATION: 0
WHEEZING: 0
CHOKING: 0
EYE DISCHARGE: 0
SORE THROAT: 0
CHEST TIGHTNESS: 0
ABDOMINAL DISTENTION: 0
FACIAL SWELLING: 0
EYE REDNESS: 0
SHORTNESS OF BREATH: 0
VOMITING: 0
EYE PAIN: 0
ABDOMINAL PAIN: 0
BACK PAIN: 0

## 2021-09-02 NOTE — PROGRESS NOTES
SUBJECTIVE:  Ms. Tomasz Ulloa is a 80 y.o. female who presents today with follow-up for nonoperative management for cholecystitis. Doing well. Did recently have diarrhea and was seen in the ED. GB improved on CT. Denies pain when eating. Patient's medications, allergies, past medical, surgical, social and family histories werereviewed and updated as appropriate. Review of Systems   Constitutional: Positive for activity change and appetite change. Negative for chills, fatigue, fever and unexpected weight change. HENT: Negative for facial swelling, hearing loss and sore throat. Eyes: Negative for pain, discharge and redness. Respiratory: Negative for choking, chest tightness, shortness of breath and wheezing. Cardiovascular: Negative for chest pain, palpitations and leg swelling. Gastrointestinal: Negative for abdominal distention, abdominal pain, constipation, diarrhea and vomiting. Musculoskeletal: Positive for joint swelling. Negative for back pain, neck pain and neck stiffness. Neurological: Negative for dizziness, speech difficulty, weakness and numbness. Psychiatric/Behavioral: Negative for agitation, hallucinations and suicidal ideas. OBJECTIVE:  Pulse 87   Temp 98.7 °F (37.1 °C) (Temporal)   Ht 5' 4\" (1.626 m)   Wt 115 lb (52.2 kg)   SpO2 97%   BMI 19.74 kg/m²   Physical Exam  Constitutional:       Appearance: Normal appearance. HENT:      Head: Normocephalic and atraumatic. Right Ear: External ear normal.      Left Ear: External ear normal.      Nose: Nose normal.   Eyes:      Pupils: Pupils are equal, round, and reactive to light. Pulmonary:      Effort: Pulmonary effort is normal.      Breath sounds: Normal breath sounds. Abdominal:      General: Bowel sounds are normal.      Palpations: Abdomen is soft. Tenderness: There is no abdominal tenderness. There is no guarding. Musculoskeletal:         General: Normal range of motion.       Cervical back: Normal range of motion and neck supple. Skin:     General: Skin is warm and dry. Neurological:      General: No focal deficit present. Mental Status: She is alert and oriented to person, place, and time. Psychiatric:         Mood and Affect: Mood normal.         Behavior: Behavior normal.         ASSESSMENT:   Diagnosis Orders   1. Acute cholecystitis     2. Cerebrovascular accident (CVA) due to thrombosis of cerebral artery ([facility]     3. Type 2 diabetes mellitus without complication, unspecified whether long term insulin use (HCC)         PLAN:  No orders of the defined types were placed in this encounter. No orders of the defined types were placed in this encounter. Patient showing improvement. Follow-up as needed. If patient begins to have pain would request cardiology clearance prior to procedure. No follow-ups on file.     Jenny Valerio DO 9/2/2021 2:06 PM

## 2021-09-08 ENCOUNTER — TELEPHONE (OUTPATIENT)
Dept: SURGERY | Age: 86
End: 2021-09-08

## 2021-09-08 NOTE — TELEPHONE ENCOUNTER
I s/w Nita Louie @ [facility], she said patient is c/o RUQ pain for the past few days, says its excruciating, she doesn't want to move or eat. She last ate yesterday afternoon. She had normal bm this morning. Vitals are stable. She's not participating in PT because she hurts she says. She finished Cipro antibiotic, still has a few Flagyl antibiotic pills left to take. I informed Dr Ousmane Art of this info, he said remind patient to not eat greasy, fatty foods. To advise her to go to ER if her pain is continuing & that badly affecting her. He said sh's high risk for surgery. She might end up needing a abbie tube placed or having cholecystectomy though. I called Nita Louie @ [facility] back today at , no answer, I left a vm telling her this info.

## 2021-09-08 NOTE — TELEPHONE ENCOUNTER
[address] [phone]  needs to speak to someone regarding pt's meds & diet    Cc:  Levora Kehr, MA Ygnacio Stalker, [address] [place] [address]

## 2021-09-09 ASSESSMENT — ENCOUNTER SYMPTOMS
BLOOD IN STOOL: 1
ABDOMINAL PAIN: 1
DIARRHEA: 1
SHORTNESS OF BREATH: 0

## 2021-09-20 ENCOUNTER — LAB REQUISITION (OUTPATIENT)
Dept: LAB | Facility: HOSPITAL | Age: 86
End: 2021-09-20

## 2021-09-20 DIAGNOSIS — Z00.00 ENCOUNTER FOR GENERAL ADULT MEDICAL EXAMINATION WITHOUT ABNORMAL FINDINGS: ICD-10-CM

## 2021-09-20 LAB
25(OH)D3 SERPL-MCNC: 22.4 NG/ML (ref 30–100)
ALBUMIN SERPL-MCNC: 3.2 G/DL (ref 3.5–5.2)
ALP SERPL-CCNC: 60 U/L (ref 39–117)
ALT SERPL W P-5'-P-CCNC: 12 U/L (ref 1–33)
ANION GAP SERPL CALCULATED.3IONS-SCNC: 9 MMOL/L (ref 5–15)
AST SERPL-CCNC: 29 U/L (ref 1–32)
BASOPHILS # BLD AUTO: 0.02 10*3/MM3 (ref 0–0.2)
BASOPHILS NFR BLD AUTO: 0.3 % (ref 0–1.5)
BILIRUB CONJ SERPL-MCNC: <0.2 MG/DL (ref 0–0.3)
BILIRUB INDIRECT SERPL-MCNC: ABNORMAL MG/DL
BILIRUB SERPL-MCNC: 0.3 MG/DL (ref 0–1.2)
BUN SERPL-MCNC: 17 MG/DL (ref 8–23)
BUN/CREAT SERPL: 16.2 (ref 7–25)
CALCIUM SPEC-SCNC: 9 MG/DL (ref 8.2–9.6)
CHLORIDE SERPL-SCNC: 103 MMOL/L (ref 98–107)
CHOLEST SERPL-MCNC: 96 MG/DL (ref 0–200)
CO2 SERPL-SCNC: 29 MMOL/L (ref 22–29)
CREAT SERPL-MCNC: 1.05 MG/DL (ref 0.57–1)
DEPRECATED RDW RBC AUTO: 38.7 FL (ref 37–54)
EOSINOPHIL # BLD AUTO: 0.2 10*3/MM3 (ref 0–0.4)
EOSINOPHIL NFR BLD AUTO: 2.9 % (ref 0.3–6.2)
ERYTHROCYTE [DISTWIDTH] IN BLOOD BY AUTOMATED COUNT: 12 % (ref 12.3–15.4)
GFR SERPL CREATININE-BSD FRML MDRD: 60 ML/MIN/1.73
GLUCOSE SERPL-MCNC: 100 MG/DL (ref 65–99)
HBA1C MFR BLD: 5.4 % (ref 4.8–5.6)
HCT VFR BLD AUTO: 33.3 % (ref 34–46.6)
HDLC SERPL-MCNC: 32 MG/DL (ref 40–60)
HGB BLD-MCNC: 11.3 G/DL (ref 12–15.9)
IMM GRANULOCYTES # BLD AUTO: 0.02 10*3/MM3 (ref 0–0.05)
IMM GRANULOCYTES NFR BLD AUTO: 0.3 % (ref 0–0.5)
LDLC SERPL CALC-MCNC: 49 MG/DL (ref 0–100)
LDLC/HDLC SERPL: 1.59 {RATIO}
LYMPHOCYTES # BLD AUTO: 1.89 10*3/MM3 (ref 0.7–3.1)
LYMPHOCYTES NFR BLD AUTO: 27.7 % (ref 19.6–45.3)
MCH RBC QN AUTO: 30.1 PG (ref 26.6–33)
MCHC RBC AUTO-ENTMCNC: 33.9 G/DL (ref 31.5–35.7)
MCV RBC AUTO: 88.6 FL (ref 79–97)
MONOCYTES # BLD AUTO: 0.76 10*3/MM3 (ref 0.1–0.9)
MONOCYTES NFR BLD AUTO: 11.1 % (ref 5–12)
NEUTROPHILS NFR BLD AUTO: 3.94 10*3/MM3 (ref 1.7–7)
NEUTROPHILS NFR BLD AUTO: 57.7 % (ref 42.7–76)
NRBC BLD AUTO-RTO: 0 /100 WBC (ref 0–0.2)
PLATELET # BLD AUTO: 155 10*3/MM3 (ref 140–450)
PMV BLD AUTO: 11.1 FL (ref 6–12)
POTASSIUM SERPL-SCNC: 3.5 MMOL/L (ref 3.5–5.2)
PREALB SERPL-MCNC: 10.6 MG/DL (ref 20–40)
PROT SERPL-MCNC: 6.2 G/DL (ref 6–8.5)
RBC # BLD AUTO: 3.76 10*6/MM3 (ref 3.77–5.28)
SODIUM SERPL-SCNC: 141 MMOL/L (ref 136–145)
TRIGL SERPL-MCNC: 66 MG/DL (ref 0–150)
TSH SERPL DL<=0.05 MIU/L-ACNC: 2.05 UIU/ML (ref 0.27–4.2)
VIT B12 BLD-MCNC: 424 PG/ML (ref 211–946)
VLDLC SERPL-MCNC: 15 MG/DL (ref 5–40)
WBC # BLD AUTO: 6.83 10*3/MM3 (ref 3.4–10.8)

## 2021-09-20 PROCEDURE — 82607 VITAMIN B-12: CPT | Performed by: INTERNAL MEDICINE

## 2021-09-20 PROCEDURE — 82306 VITAMIN D 25 HYDROXY: CPT | Performed by: INTERNAL MEDICINE

## 2021-09-20 PROCEDURE — 84443 ASSAY THYROID STIM HORMONE: CPT | Performed by: INTERNAL MEDICINE

## 2021-09-20 PROCEDURE — 80076 HEPATIC FUNCTION PANEL: CPT | Performed by: INTERNAL MEDICINE

## 2021-09-20 PROCEDURE — 80061 LIPID PANEL: CPT | Performed by: INTERNAL MEDICINE

## 2021-09-20 PROCEDURE — 80048 BASIC METABOLIC PNL TOTAL CA: CPT | Performed by: INTERNAL MEDICINE

## 2021-09-20 PROCEDURE — 36415 COLL VENOUS BLD VENIPUNCTURE: CPT | Performed by: INTERNAL MEDICINE

## 2021-09-20 PROCEDURE — 84134 ASSAY OF PREALBUMIN: CPT | Performed by: INTERNAL MEDICINE

## 2021-09-20 PROCEDURE — 85025 COMPLETE CBC W/AUTO DIFF WBC: CPT | Performed by: INTERNAL MEDICINE

## 2021-09-20 PROCEDURE — 83036 HEMOGLOBIN GLYCOSYLATED A1C: CPT | Performed by: INTERNAL MEDICINE

## 2021-10-30 ENCOUNTER — HOME HEALTH ADMISSION (OUTPATIENT)
Dept: HOME HEALTH SERVICES | Facility: HOME HEALTHCARE | Age: 86
End: 2021-10-30

## 2021-10-30 ENCOUNTER — TRANSCRIBE ORDERS (OUTPATIENT)
Dept: HOME HEALTH SERVICES | Facility: HOME HEALTHCARE | Age: 86
End: 2021-10-30

## 2021-10-30 DIAGNOSIS — K21.9 GASTROESOPHAGEAL REFLUX DISEASE WITHOUT ESOPHAGITIS: Primary | ICD-10-CM

## 2021-11-01 ENCOUNTER — HOME CARE VISIT (OUTPATIENT)
Dept: HOME HEALTH SERVICES | Facility: CLINIC | Age: 86
End: 2021-11-01

## 2021-11-01 VITALS
RESPIRATION RATE: 18 BRPM | DIASTOLIC BLOOD PRESSURE: 52 MMHG | TEMPERATURE: 98.3 F | OXYGEN SATURATION: 98 % | SYSTOLIC BLOOD PRESSURE: 130 MMHG | HEART RATE: 76 BPM

## 2021-11-01 PROCEDURE — G0299 HHS/HOSPICE OF RN EA 15 MIN: HCPCS

## 2021-11-01 NOTE — HOME HEALTH
Pt/cg agreeable to homecare admission under the care of Dr. Matt Ortega. Admission agreement and safety plan reviewed and signed by the pt. Medication reconciliation completed and no issues found. No recent falls and no upcoming insurance changes. Pt had no further questions regarding medication and/or plan of care. A&O X 4. Pt c/o abdominal pain due to cholecystitis. The pt and her family have decided that the pt will not undergo surgery to remove her gallbladder due to her age. The pt had some crackles in her lower lungs and SOA with exertion. I encouraged and educated the pt on pulmonary hygiene to be performed multiple times daily.

## 2021-11-02 ENCOUNTER — HOME CARE VISIT (OUTPATIENT)
Dept: HOME HEALTH SERVICES | Facility: CLINIC | Age: 86
End: 2021-11-02

## 2021-11-02 VITALS
TEMPERATURE: 97.1 F | RESPIRATION RATE: 16 BRPM | DIASTOLIC BLOOD PRESSURE: 68 MMHG | SYSTOLIC BLOOD PRESSURE: 133 MMHG | HEART RATE: 63 BPM | OXYGEN SATURATION: 96 %

## 2021-11-02 VITALS
OXYGEN SATURATION: 99 % | RESPIRATION RATE: 16 BRPM | TEMPERATURE: 97.7 F | SYSTOLIC BLOOD PRESSURE: 132 MMHG | HEART RATE: 60 BPM | DIASTOLIC BLOOD PRESSURE: 68 MMHG

## 2021-11-02 PROCEDURE — G0151 HHCP-SERV OF PT,EA 15 MIN: HCPCS

## 2021-11-02 PROCEDURE — G0152 HHCP-SERV OF OT,EA 15 MIN: HCPCS

## 2021-11-02 NOTE — HOME HEALTH
Primary dx: HTN  Secondary dx: DM per patient, CVA  Surgical:    Subjective: Patient reports just don't have an appetite and the energy to move.  Previous OT: yes at SNF  Fall prevention education: Recommended shoes with a back, tub transfer bench  Diabetic teaching: Instructed on the importance of daily footcare.  Fall reported: none reported  Medication Changes: none reported  Medication teaching:  Patient has no questions regarding medication.  Insurance Changes: none reported  Reason for referral: Patient/daughter reports patient recently d/c from SNF after being there for almost 3 weeks.  Patient is now home with daughter and may be weaker and needing more assistance with ADL/mobility.  Precautions: fall risk  Equipment: rollator, BSC; recommended a tub transfer bench and handheld shower head  PLOF: Pt lived alone at [facility].  Pt was independent with bADL and using a rollator for mobility.  Patient Goals for therapy:  Enjoy my kids  Medical necessity: Skilled OT is medically necessary to increase safety and independence with ADL, increase activity tolerance with ADL.  Next MD appointment: Dr. Ortega 11/4/2021  Frequency: 1wk4  Plan for next visit: UB HEP    Other: Spoke to Serena at Dr. Ortega's office regarding patient's complaint of burning sensation when first urinating and MSW referral for community resources.

## 2021-11-04 ENCOUNTER — HOME CARE VISIT (OUTPATIENT)
Dept: HOME HEALTH SERVICES | Facility: CLINIC | Age: 86
End: 2021-11-04

## 2021-11-05 ENCOUNTER — HOME CARE VISIT (OUTPATIENT)
Dept: HOME HEALTH SERVICES | Facility: CLINIC | Age: 86
End: 2021-11-05

## 2021-11-05 VITALS
HEART RATE: 67 BPM | RESPIRATION RATE: 16 BRPM | DIASTOLIC BLOOD PRESSURE: 62 MMHG | SYSTOLIC BLOOD PRESSURE: 130 MMHG | OXYGEN SATURATION: 95 % | TEMPERATURE: 97.8 F

## 2021-11-05 PROCEDURE — G0157 HHC PT ASSISTANT EA 15: HCPCS

## 2021-11-05 NOTE — HOME HEALTH
SUBJECTIVE: Patient states she doesn't have pain but does have occasion stomach upset.  She denies falls, changes to medication or changes to insurance.

## 2021-11-09 ENCOUNTER — HOME CARE VISIT (OUTPATIENT)
Dept: HOME HEALTH SERVICES | Facility: CLINIC | Age: 86
End: 2021-11-09

## 2021-11-09 ENCOUNTER — HOME CARE VISIT (OUTPATIENT)
Dept: HOME HEALTH SERVICES | Facility: HOME HEALTHCARE | Age: 86
End: 2021-11-09

## 2021-11-09 VITALS
OXYGEN SATURATION: 98 % | DIASTOLIC BLOOD PRESSURE: 62 MMHG | RESPIRATION RATE: 18 BRPM | HEART RATE: 48 BPM | TEMPERATURE: 98 F | SYSTOLIC BLOOD PRESSURE: 138 MMHG

## 2021-11-09 PROCEDURE — G0300 HHS/HOSPICE OF LPN EA 15 MIN: HCPCS

## 2021-11-09 PROCEDURE — G0155 HHCP-SVS OF CSW,EA 15 MIN: HCPCS

## 2021-11-10 ENCOUNTER — HOME CARE VISIT (OUTPATIENT)
Dept: HOME HEALTH SERVICES | Facility: CLINIC | Age: 86
End: 2021-11-10

## 2021-11-10 VITALS
SYSTOLIC BLOOD PRESSURE: 142 MMHG | OXYGEN SATURATION: 95 % | DIASTOLIC BLOOD PRESSURE: 62 MMHG | RESPIRATION RATE: 20 BRPM | HEART RATE: 54 BPM | TEMPERATURE: 98 F

## 2021-11-10 PROCEDURE — G0157 HHC PT ASSISTANT EA 15: HCPCS

## 2021-11-10 NOTE — HOME HEALTH
SUBJECTIVE: Patient states she constantly has pain in her stomach and is unable give a numerical pain rating. She denies falls, changes to medication or insurance.

## 2021-11-10 NOTE — HOME HEALTH
SKILLED SERVICES PROVIDED / MEDICAL   ASSESSMENT OF SOCIAL AND EMOTIONAL FACTORS  COUNSELING FOR LONG RANGE PLANNING AND DECISION MAKING  COMMUNITY RESOURCE PLANNING    HOMEBOUND STATUS-falls risk, taxing effort to get out, weak    HOME/SOCIAL ENVIRONMENT- Patient lives with her daughter in a one bedroom apt. Patient and her daughter are on the list for a 2 bedroom apt.

## 2021-11-11 ENCOUNTER — HOME CARE VISIT (OUTPATIENT)
Dept: HOME HEALTH SERVICES | Facility: CLINIC | Age: 86
End: 2021-11-11

## 2021-11-12 ENCOUNTER — HOME CARE VISIT (OUTPATIENT)
Dept: HOME HEALTH SERVICES | Facility: CLINIC | Age: 86
End: 2021-11-12

## 2021-11-12 VITALS
TEMPERATURE: 97.8 F | DIASTOLIC BLOOD PRESSURE: 62 MMHG | SYSTOLIC BLOOD PRESSURE: 124 MMHG | RESPIRATION RATE: 18 BRPM | HEART RATE: 52 BPM | OXYGEN SATURATION: 95 %

## 2021-11-12 PROCEDURE — G0157 HHC PT ASSISTANT EA 15: HCPCS

## 2021-11-12 NOTE — HOME HEALTH
SUBJECTIVE: Patient states her stomach really hurts today due to her gallbladder. She says they will not do surgery because she is too high risk. She rates stomach pain an 8/10. She denies falls, changes to insurance or medication. Patient's daughter states she is enourging her to get up and get things she needs for herself and says she was up several times yesterday doing so. She says she set up a chair in her bedroom so she can sit and watch TV and she did this for several hours yesterday as well.
36.6

## 2021-11-12 NOTE — HOME HEALTH
Pateint was unable to urinate at visit, left urine cup with instructions to cath urine, apply lid, place in bag, refrigerate and call this nurse for pickup, daughter has my name and phone number. Patient verb understanding. Called again Wednesday, no answer. Called again Thursday 11/11/2021, daughter answered phone and states patient has urinated yet not in the cup. Daughter states she will remind her again and make sure she does it either tonight or in the morning. Daughter verified she has my number on her phone. Daughter states patient does not have a current urologist, daughter states it has been years since patient has been to a urologist. Daughter states santost hasn't seen her PCP in a long time either.

## 2021-11-15 ENCOUNTER — HOME CARE VISIT (OUTPATIENT)
Dept: HOME HEALTH SERVICES | Facility: CLINIC | Age: 86
End: 2021-11-15

## 2021-11-15 VITALS
SYSTOLIC BLOOD PRESSURE: 130 MMHG | OXYGEN SATURATION: 99 % | HEART RATE: 78 BPM | RESPIRATION RATE: 18 BRPM | DIASTOLIC BLOOD PRESSURE: 80 MMHG | TEMPERATURE: 96.9 F

## 2021-11-15 PROCEDURE — G0157 HHC PT ASSISTANT EA 15: HCPCS

## 2021-11-15 NOTE — HOME HEALTH
SUBJECTIVE: Patient's daughter states she is not feeling well today and has had stomach upset. Patient states she also is not feeling well today. She rates stomach upset as a 9/10. She denies performing HEP stating she does not feel as well enough to do them. Patient's daughter says she has been up more often doing more for herself over the weekend.

## 2021-11-16 ENCOUNTER — HOME CARE VISIT (OUTPATIENT)
Dept: HOME HEALTH SERVICES | Facility: CLINIC | Age: 86
End: 2021-11-16

## 2021-11-16 VITALS
HEART RATE: 63 BPM | RESPIRATION RATE: 16 BRPM | DIASTOLIC BLOOD PRESSURE: 70 MMHG | SYSTOLIC BLOOD PRESSURE: 130 MMHG | OXYGEN SATURATION: 99 % | TEMPERATURE: 97.8 F

## 2021-11-16 PROCEDURE — G0152 HHCP-SERV OF OT,EA 15 MIN: HCPCS

## 2021-11-16 NOTE — HOME HEALTH
Subjective: Pt reports getting stronger.  Patient's daughter reports patient stepped in/out of the tub better this morning, patient walking to the kitchen to fix coffee or something to eat without assistance.  Pt complained of headache.  Patient's daughter reports obtained a tub transfer bench and suction cup grab bars.  Patient's daughter reports patient received life alert button.  Fall reported: none reported  Medication Changes: none reported  Medication teaching:  none  Insurance Changes: none reported  Precautions: fall risk  Medical necessity: Skilled OT is medically necessary to instruct on UE HEP to increase UE function with ADL, increase safety and independence with ADL/mobility, patient's daughter education on tub transfer bench transfer  Next MD appointment: none at this time  D/C plan: d/c home with daughter with HEP next OT visit with goals met or maximum functional potential achieved.  Frequency: 1wk1  Plan for next visit: UB HEP, ADL transfers  Discharge Instructions: n/a  Medicare of Non-Coverage Form: n/a

## 2021-11-17 ENCOUNTER — HOME CARE VISIT (OUTPATIENT)
Dept: HOME HEALTH SERVICES | Facility: CLINIC | Age: 86
End: 2021-11-17

## 2021-11-17 VITALS
TEMPERATURE: 97.1 F | DIASTOLIC BLOOD PRESSURE: 58 MMHG | SYSTOLIC BLOOD PRESSURE: 130 MMHG | HEART RATE: 95 BPM | RESPIRATION RATE: 18 BRPM | OXYGEN SATURATION: 64 %

## 2021-11-17 PROCEDURE — G0300 HHS/HOSPICE OF LPN EA 15 MIN: HCPCS

## 2021-11-17 NOTE — CASE COMMUNICATION
Physical Therapy visit on 11/17/21 missed due to no answer at door when therapist arrived for scheduled visit therefore scheduled number of visits not met.

## 2021-11-22 ENCOUNTER — LAB REQUISITION (OUTPATIENT)
Dept: LAB | Facility: HOSPITAL | Age: 86
End: 2021-11-22

## 2021-11-22 ENCOUNTER — HOME CARE VISIT (OUTPATIENT)
Dept: HOME HEALTH SERVICES | Facility: HOME HEALTHCARE | Age: 86
End: 2021-11-22

## 2021-11-22 VITALS
RESPIRATION RATE: 18 BRPM | DIASTOLIC BLOOD PRESSURE: 64 MMHG | HEART RATE: 54 BPM | SYSTOLIC BLOOD PRESSURE: 140 MMHG | TEMPERATURE: 98.5 F | OXYGEN SATURATION: 90 %

## 2021-11-22 DIAGNOSIS — Z00.00 ENCOUNTER FOR GENERAL ADULT MEDICAL EXAMINATION WITHOUT ABNORMAL FINDINGS: ICD-10-CM

## 2021-11-22 PROCEDURE — G0300 HHS/HOSPICE OF LPN EA 15 MIN: HCPCS

## 2021-11-22 PROCEDURE — 87086 URINE CULTURE/COLONY COUNT: CPT | Performed by: FAMILY MEDICINE

## 2021-11-22 NOTE — HOME HEALTH
Daughter states patient has finally produced a urine specimen. Specimen labeled in fron of patient. Speciment transported [facility] lab.

## 2021-11-23 ENCOUNTER — HOME CARE VISIT (OUTPATIENT)
Dept: HOME HEALTH SERVICES | Facility: CLINIC | Age: 86
End: 2021-11-23

## 2021-11-23 VITALS
HEART RATE: 57 BPM | DIASTOLIC BLOOD PRESSURE: 80 MMHG | OXYGEN SATURATION: 100 % | SYSTOLIC BLOOD PRESSURE: 137 MMHG | TEMPERATURE: 96.8 F | RESPIRATION RATE: 16 BRPM

## 2021-11-23 LAB — BACTERIA SPEC AEROBE CULT: NORMAL

## 2021-11-23 PROCEDURE — G0152 HHCP-SERV OF OT,EA 15 MIN: HCPCS

## 2021-11-23 NOTE — HOME HEALTH
Subjective: Upon arrival patient sitting on the EOB drying off after having a shower.  Fall reported: none reported  Medication Changes: none reported  Medication teaching:  none  Insurance Changes: none reported  Precautions: fall risk  Medical necessity: Skilled OT is medically necessary to increase safety and independence with ADL/mobility, increase UE function with ADL.  Next MD appointment: unknown at this time  D/C plan: d/c home with daughter with maximum functional potential achieved.  Discharge Instructions: yes  Medicare of Non-Coverage Form: Yes

## 2021-11-30 ENCOUNTER — HOME CARE VISIT (OUTPATIENT)
Dept: HOME HEALTH SERVICES | Facility: CLINIC | Age: 86
End: 2021-11-30

## 2021-11-30 VITALS
SYSTOLIC BLOOD PRESSURE: 104 MMHG | TEMPERATURE: 98.9 F | OXYGEN SATURATION: 98 % | DIASTOLIC BLOOD PRESSURE: 60 MMHG | HEART RATE: 77 BPM | RESPIRATION RATE: 16 BRPM

## 2021-11-30 PROCEDURE — G0495 RN CARE TRAIN/EDU IN HH: HCPCS

## 2022-08-17 NOTE — PROGRESS NOTES
HPI     CC: Pt is here today for a Diabetic eye exam. He states that he is   currently wearing over the counter glasses to read. He is seeing well at a   distance.    NITISH: 2021    (+) Changes in vision , near  (-) Pain  (-) Irritation   (-) Itching   (-) Flashes  (-) Floaters  (+) Glasses, reading only  (-) CL wearer  (-) Uses eye gtts    Does patient want a refraction today? yes    (-) Eye injury  (-) Eye surgery   (-)POHx  (-)FOHx    (+)DM  Hemoglobin A1C       Date                     Value               Ref Range             Status                04/25/2022               9.1 (H)             4.0 - 5.6 %           Final                   01/31/2022               10.7 (H)            4.0 - 5.6 %           Final                   07/22/2021               10.3 (H)            4.0 - 5.6 %           Final                 Last edited by Emy Jo, OD on 8/17/2022  3:43 PM. (History)            Assessment /Plan     For exam results, see Encounter Report.    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 Educated pt on findings. Moderate to severe NPDR OU. Stressed importance of BS control --- patient reports he and PCP are working on lowering it. Discussed risk for LOV/blindness if BS continues to be uncontrolled. Recommend screening eyes separately at home. If distortion or changes in vision noted, RTC ASAP. Patient previously seen by Dr. Kong. Refer back to Dr. Kong. Patient would like to be seen at [facility]. Monitor.     2. Educated pt on findings. Not visually significant. No need for removal at this time. Monitor yearly.     3. Updated SRx. Given option of FTW specs vs OTC reading glasses only prn for near work. Discussed BS role in refraction. Monitor yearly.       RTC with   Progress Note  Date:2021       Room:0312/312-01  Patient Name:Janiya Oliver     Date of Birth:[birth year]     Age:89 y.o. Subjective    Subjective: 72-year-old lady with a history of type 2 diabetes, prior CVA x2, GERD, presented to the hospital after a fall at home.  Further work-up in the ED also revealed patient been having abdominal pain and was noted to have acute cholecystitis.  Patient denied any chest pain but stated she felt some dizziness and lightheadedness prior to the fall.  Noted to have troponin of 0.08 on admission which is downtrending currently 0.06, discussed with daughter in terms of plan moving forward. Carin Lies patient's age, and medical comorbidities patient is at a high risk for surgery.  Discussed with general surgeon and at this time recommending conservative management with continuation of antibiotics for cholecystitis, monitor patient's clinical status . Patient's echocardiogram compared to 2018 showed decreased LV ejection fraction now to 40% and moderately aortic regurgitations.  HIDA shows nonfilling of the gallbladder concerning for mechanical cystic duct obstruction. Patient and family decided to proceed with cholecystostomy tube placement     Patient was seen this morning in her room with daughter present, no acute overnight event, Denied any nausea or emesis. Review of Systems: 12 point system review negative suggested above. Objective         Vitals Last 24 Hours:  TEMPERATURE:  Temp  Av.4 °F (36.3 °C)  Min: 96.7 °F (35.9 °C)  Max: 98.2 °F (36.8 °C)  RESPIRATIONS RANGE: Resp  Av.5  Min: 16  Max: 18  PULSE OXIMETRY RANGE: SpO2  Av %  Min: 96 %  Max: 100 %  PULSE RANGE: Pulse  Av.3  Min: 58  Max: 66  BLOOD PRESSURE RANGE: Systolic (93RBQ), ZZX:623 , Min:110 , PTK:280   ; Diastolic (36GYG), TNP:21, Min:50, Max:72    I/O (24Hr):     Intake/Output Summary (Last 24 hours) at 2021 9062  Last data filed at 2021  Gross per 24 hour Dilan as directed, me prn.                   Intake 10 ml   Output 850 ml   Net -840 ml       Physical Examination:  General: Well-developed, no acute distress lying comfortably in bed. HEENT: Atraumatic normocephalic, range of motion normal JVD, no tracheal deviation noted. Cardiac: Normal S1-S2 no murmurs rub or gallop. Respiratory: clear To auscultation bilaterally, no rhonchi or rales, no wheezing  Abdomen: Soft, positive bowel sounds in all quadrants, no distention, + tenderness to RUQ, no organomegaly noted, no rebound noted. Extremities: no tenderness, no edema, moves all extremities  Psych: Affect normal and good eye contact, behavioral normal.        Labs/Imaging/Diagnostics    Labs:  CBC:  Recent Labs     08/15/21  0418 08/16/21  0204 08/17/21  0148   WBC 6.4 6.2 5.2   RBC 4.05* 4.23 4.19*   HGB 12.8 13.1 12.7   HCT 37.2 38.3 38.3   MCV 91.9 90.5 91.4   RDW 12.1 11.9 11.9   * 134 128*     CHEMISTRIES:  Recent Labs     08/15/21  0418 08/16/21  0204 08/17/21  0148    145 143   K 3.5 3.4* 4.5    110 109   CO2 24 24 24   BUN 6* 9 8   CREATININE 0.8 0.8 1.0*   GLUCOSE 91 119* 86   MG 2.0 1.9  --      PT/INR:  Recent Labs     08/17/21 0921   PROTIME 15.7*   INR 1.24*     APTT:  Recent Labs     08/17/21 0921   APTT 33.8     LIVER PROFILE:  Recent Labs     08/15/21  0418 08/16/21  0204 08/17/21  0148   AST 16 39* 171*   ALT <5* 10 86*   BILITOT 1.7* 1.2 1.4*   ALKPHOS 66 76 82       Imaging Last 24 Hours:  NM HEPATOBILIARY    Result Date: 8/16/2021  NM HEPATOBILIARY 8/16/2021 2:02 PM HISTORY: Abnormal CT and ultrasound, concern for acute cholecystitis Comparison: CT scan dated 8/12/2021 Radiopharmaceutical: 5 mCi technetium 99m Mebrofenin IV. Technique: Anterior images were acquired over the upper abdomen for a period of 60 minutes following intravenous administration of the radiopharmaceutical. Findings:  Symmetric liver parenchymal uptake.  There is progression of the isotope from the liver into the intrahepatic biliary ducts and common bile duct, with the common bile duct well visible by the 20th minute. Progressive accumulation of radiotracer within the common duct with accumulation in the duodenum. There is a small focus of contrast pooling in the region of the gallbladder fossa around the 45-50 minute time points, however, on the subsequent imaging this is revealed to be pooling in the small bowel. Impression: 1. HIDA scan shows non-filling of the gallbladder, concerning for mechanical cystic duct obstruction. Signed by Dr Yoselin Anaya    Assessment//Plan           Hospital Problems         Last Modified POA    * (Principal) Acute cholecystitis 8/12/2021 Yes    Essential hypertension 8/12/2021 Yes    Type 2 diabetes mellitus with circulatory disorder ([address] (Chronic) 8/12/2021 Yes    History of stroke 8/12/2021 Yes    Syncope 8/12/2021 Yes    Fall at home 8/12/2021 Yes               R sided weakness following generalized weakness, POA - now at 36 hrs, CT (-) but MRI shows subacute L basal ganglia stroke without hemorrhage. TTE (-)             Assessment & Plan      Acute cholecystitis  Initially on broad-spectrum antibiotics Zosyn and transition to Cipro and Flagyl. Patient tolerating oral antibiotics well with improvement in clinical status. Evaluated by general surgery recommend no surgical intervention at this time given patient's advanced age and medical comorbidities. HIDA scan obtained today which shows nonfilling of the gallbladder plans for cholecystostomy tube placement     Hyperglycemia: resolved  Patient carries prior history of type 2 diabetes however A1c noted to be 5.3. Monitor blood sugars in-house  Hypoglycemia protocol. Fall at home prior to admission secondary to syncope  Echo with reduced LV function EF of 40% compared to prior echocardiogram.  Moderate global hypokinesis with grade 1 diastolic dysfunction and moderate aortic regurgitation.   No complaints of chest pain or EKG acute changes in

## 2025-06-30 ENCOUNTER — OFFICE VISIT (OUTPATIENT)
Age: 89
End: 2025-06-30

## 2025-06-30 VITALS
RESPIRATION RATE: 21 BRPM | HEART RATE: 74 BPM | OXYGEN SATURATION: 97 % | TEMPERATURE: 97 F | SYSTOLIC BLOOD PRESSURE: 119 MMHG | BODY MASS INDEX: 17.34 KG/M2 | DIASTOLIC BLOOD PRESSURE: 75 MMHG | WEIGHT: 101 LBS

## 2025-06-30 DIAGNOSIS — H10.11 ALLERGIC CONJUNCTIVITIS OF RIGHT EYE: Primary | ICD-10-CM

## 2025-06-30 RX ORDER — ERYTHROMYCIN 5 MG/G
OINTMENT OPHTHALMIC
Qty: 3.5 G | Refills: 0 | Status: SHIPPED | OUTPATIENT
Start: 2025-06-30

## 2025-06-30 RX ORDER — LOSARTAN POTASSIUM 50 MG/1
50 TABLET ORAL DAILY
COMMUNITY
Start: 2025-06-20

## 2025-06-30 RX ORDER — CETIRIZINE HYDROCHLORIDE 10 MG/1
5 TABLET ORAL DAILY
Qty: 15 TABLET | Refills: 0 | Status: SHIPPED | OUTPATIENT
Start: 2025-06-30

## 2025-06-30 RX ORDER — OLOPATADINE HYDROCHLORIDE 1 MG/ML
1 SOLUTION OPHTHALMIC 2 TIMES DAILY
Qty: 1 EACH | Refills: 0 | Status: SHIPPED | OUTPATIENT
Start: 2025-06-30 | End: 2025-07-10

## 2025-06-30 ASSESSMENT — ENCOUNTER SYMPTOMS
SHORTNESS OF BREATH: 0
RHINORRHEA: 0
APNEA: 0
DIARRHEA: 0
ABDOMINAL PAIN: 0
NAUSEA: 0
SINUS PAIN: 0
SORE THROAT: 0
WHEEZING: 0
TROUBLE SWALLOWING: 0
COLOR CHANGE: 0
EYE DISCHARGE: 1
CONSTIPATION: 0
VOMITING: 0
EYE ITCHING: 0
COUGH: 0
CHEST TIGHTNESS: 0
SINUS PRESSURE: 0
EYE PAIN: 0
EYE REDNESS: 1
ABDOMINAL DISTENTION: 0

## 2025-06-30 NOTE — PATIENT INSTRUCTIONS
Conjunctivitis    - Pataday sent to pharmacy; use as prescribed.    - Zyrtec sent to pharmacy; take daily as prescribed.    - Erythromycin ointment sent to pharmacy; apply one ribbon to the eye as directed.     - Recommended warm, moist compresses three to five times per day.     - Wash hands frequently and avoid touching the eyes.    - Wash towels, bed sheets, and pillow cases to avoid transmission.    - If applicable, discontinue eye makeup to support healing.    - If applicable, discontinue use of contacts until treatment is complete and wear glasses only.    - The patient is to follow up with PCP or return to clinic if symptoms worsen/fail to improve.

## 2025-06-30 NOTE — PROGRESS NOTES
[facility], [facility] ([place])  [facility]  [address]  [facility] [id number]  Dept: [phone]  Dept Fax: [phone]    Janiya Oliver is a 89 y.o. female who presents today for her medical conditions/complaints as noted below.  Janiya Oliver is c/o of Eye Problem (Red/discharge x 3-4 days)        HPI:     Janiya Oliver presents with complaints of right eye redness and discharge. Patient states her symptoms started 3-4 days ago, and it is worse when she first wakes up in the morning. The eye is watery with clear drainage and it is crusted over when she wakes up. She denies any pain from the eye, but she states it is very itchy. She denies any other symptoms. Patient's daughter is present with her, she states she has been applying warm compresses to the eye, but it hasn't given her much relief. Patient is stable at this time.     Denies any recent antibiotic or steroid administration.      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     Hiatal hernia     Hypertension     Other disorders of kidney and ureter in diseases classified elsewhere     Pneumonia     Stroke (HCC)     Tobacco abuse     Type 2 diabetes mellitus without complication (HCC) 12/18/2015     Past Surgical History:   Procedure Laterality Date    CARDIAC CATHETERIZATION  09/25/2003    EF greater 50%  Angiographically normal coronary arteries, Normal left ventricular systolic function, Mild left ventricular systolic function, Mild left ventricular diastolic dysfunction and mild systemic hypertesion.     COLONOSCOPY      CT PERC